# Patient Record
Sex: FEMALE | Race: WHITE | Employment: OTHER | URBAN - METROPOLITAN AREA
[De-identification: names, ages, dates, MRNs, and addresses within clinical notes are randomized per-mention and may not be internally consistent; named-entity substitution may affect disease eponyms.]

---

## 2017-02-10 ENCOUNTER — HOSPITAL ENCOUNTER (OUTPATIENT)
Dept: ULTRASOUND IMAGING | Age: 77
Discharge: HOME | End: 2017-02-10
Attending: UROLOGY
Payer: MEDICARE

## 2017-02-10 DIAGNOSIS — N39.0 RECURRENT UTI: ICD-10-CM

## 2017-02-10 PROCEDURE — 76770 US EXAM ABDO BACK WALL COMP: CPT

## 2017-05-15 ENCOUNTER — HOSPITAL ENCOUNTER (OUTPATIENT)
Dept: MRI IMAGING | Age: 77
Discharge: HOME | End: 2017-05-15
Attending: INTERNAL MEDICINE
Payer: MEDICARE

## 2017-05-15 DIAGNOSIS — K86.2 PANCREAS CYST: ICD-10-CM

## 2017-05-15 LAB — CREAT BLD-MCNC: 0.6 MG/DL (ref 0.6–1)

## 2017-05-15 PROCEDURE — 74011000258 HC RX REV CODE- 258: Performed by: INTERNAL MEDICINE

## 2017-05-15 PROCEDURE — 74183 MRI ABD W/O CNTR FLWD CNTR: CPT

## 2017-05-15 PROCEDURE — 74011250636 HC RX REV CODE- 250/636: Performed by: INTERNAL MEDICINE

## 2017-05-15 PROCEDURE — 82565 ASSAY OF CREATININE: CPT

## 2017-05-15 PROCEDURE — A9577 INJ MULTIHANCE: HCPCS | Performed by: INTERNAL MEDICINE

## 2017-05-15 RX ORDER — SODIUM CHLORIDE 0.9 % (FLUSH) 0.9 %
10 SYRINGE (ML) INJECTION
Status: COMPLETED | OUTPATIENT
Start: 2017-05-15 | End: 2017-05-15

## 2017-05-15 RX ADMIN — GADOBENATE DIMEGLUMINE 10 ML: 529 INJECTION, SOLUTION INTRAVENOUS at 17:22

## 2017-05-15 RX ADMIN — SODIUM CHLORIDE 100 ML: 900 INJECTION, SOLUTION INTRAVENOUS at 17:22

## 2017-05-15 RX ADMIN — Medication 10 ML: at 17:22

## 2017-05-23 ENCOUNTER — HOSPITAL ENCOUNTER (OUTPATIENT)
Dept: LAB | Age: 77
Discharge: HOME | End: 2017-05-23
Payer: MEDICARE

## 2017-05-23 DIAGNOSIS — R07.9 CHEST PAIN, UNSPECIFIED TYPE: ICD-10-CM

## 2017-05-23 DIAGNOSIS — R53.83 FATIGUE, UNSPECIFIED TYPE: ICD-10-CM

## 2017-05-23 LAB
ALBUMIN SERPL BCP-MCNC: 3.7 G/DL (ref 3.2–4.6)
ALBUMIN/GLOB SERPL: 0.9 {RATIO}
ALP SERPL-CCNC: 116 U/L (ref 50–136)
ALT SERPL-CCNC: 16 U/L (ref 12–65)
ANION GAP BLD CALC-SCNC: 5 MMOL/L
AST SERPL W P-5'-P-CCNC: 16 U/L (ref 15–37)
BASOPHILS # BLD AUTO: 0.1 K/UL (ref 0–0.2)
BASOPHILS # BLD: 1 % (ref 0–2)
BILIRUB SERPL-MCNC: 0.6 MG/DL (ref 0.2–1.1)
BNP SERPL-MCNC: 135 PG/ML
BUN SERPL-MCNC: 19 MG/DL (ref 8–23)
CALCIUM SERPL-MCNC: 9.3 MG/DL (ref 8.3–10.4)
CHLORIDE SERPL-SCNC: 107 MMOL/L (ref 98–107)
CK SERPL-CCNC: 39 U/L (ref 21–215)
CO2 SERPL-SCNC: 30 MMOL/L (ref 21–32)
CREAT SERPL-MCNC: 0.7 MG/DL (ref 0.6–1)
DIFFERENTIAL METHOD BLD: ABNORMAL
EOSINOPHIL # BLD: 0.1 K/UL (ref 0–0.8)
EOSINOPHIL NFR BLD: 1 % (ref 0.5–7.8)
ERYTHROCYTE [DISTWIDTH] IN BLOOD BY AUTOMATED COUNT: 13 % (ref 11.9–14.6)
ERYTHROCYTE [SEDIMENTATION RATE] IN BLOOD: 21 MM/HR (ref 0–30)
GLOBULIN SER CALC-MCNC: 3.9 G/DL
GLUCOSE SERPL-MCNC: 103 MG/DL (ref 65–100)
HCT VFR BLD AUTO: 42.6 % (ref 35.8–46.3)
HGB BLD-MCNC: 14.1 G/DL (ref 11.7–15.4)
LYMPHOCYTES # BLD AUTO: 21 % (ref 13–44)
LYMPHOCYTES # BLD: 1.8 K/UL (ref 0.5–4.6)
MCH RBC QN AUTO: 30.4 PG (ref 26.1–32.9)
MCHC RBC AUTO-ENTMCNC: 33.1 G/DL (ref 31.4–35)
MCV RBC AUTO: 91.8 FL (ref 79.6–97.8)
MONOCYTES # BLD: 0.8 K/UL (ref 0.1–1.3)
MONOCYTES NFR BLD AUTO: 9 % (ref 4–12)
NEUTS SEG # BLD: 6.1 K/UL (ref 1.7–8.2)
NEUTS SEG NFR BLD AUTO: 68 % (ref 43–78)
PLATELET # BLD AUTO: 266 K/UL (ref 150–450)
PMV BLD AUTO: 9.7 FL (ref 10.8–14.1)
POTASSIUM SERPL-SCNC: 4.5 MMOL/L (ref 3.5–5.1)
PROT SERPL-MCNC: 7.6 G/DL (ref 6.3–8.2)
RBC # BLD AUTO: 4.64 M/UL (ref 4.05–5.25)
SODIUM SERPL-SCNC: 142 MMOL/L (ref 136–145)
TSH SERPL DL<=0.005 MIU/L-ACNC: 1.16 UIU/ML (ref 0.36–3.74)
WBC # BLD AUTO: 9 K/UL (ref 4.3–11.1)

## 2017-05-23 PROCEDURE — 83880 ASSAY OF NATRIURETIC PEPTIDE: CPT | Performed by: INTERNAL MEDICINE

## 2017-05-23 PROCEDURE — 36415 COLL VENOUS BLD VENIPUNCTURE: CPT | Performed by: INTERNAL MEDICINE

## 2017-05-23 PROCEDURE — 84443 ASSAY THYROID STIM HORMONE: CPT | Performed by: INTERNAL MEDICINE

## 2017-05-23 PROCEDURE — 82550 ASSAY OF CK (CPK): CPT | Performed by: INTERNAL MEDICINE

## 2017-05-23 PROCEDURE — 80053 COMPREHEN METABOLIC PANEL: CPT | Performed by: INTERNAL MEDICINE

## 2017-05-23 PROCEDURE — 85025 COMPLETE CBC W/AUTO DIFF WBC: CPT | Performed by: INTERNAL MEDICINE

## 2017-05-23 PROCEDURE — 85652 RBC SED RATE AUTOMATED: CPT | Performed by: INTERNAL MEDICINE

## 2017-05-23 NOTE — PROGRESS NOTES
Ms Giovana Weiss,  Your blood work looks very well, heart failure hormone level is normal along with all the others evaluating for possible autoimmune disease. See you  Back after your stress testing completed.   thanks

## 2017-06-26 PROBLEM — R00.2 PALPITATIONS: Status: ACTIVE | Noted: 2017-06-26

## 2018-03-12 ENCOUNTER — HOSPITAL ENCOUNTER (OUTPATIENT)
Age: 78
Setting detail: OUTPATIENT SURGERY
Discharge: HOME | End: 2018-03-12
Attending: ORTHOPAEDIC SURGERY | Admitting: ORTHOPAEDIC SURGERY
Payer: MEDICARE

## 2018-03-12 ENCOUNTER — ANESTHESIA (OUTPATIENT)
Dept: SURGERY | Age: 78
End: 2018-03-12
Payer: MEDICARE

## 2018-03-12 ENCOUNTER — ANESTHESIA EVENT (OUTPATIENT)
Dept: SURGERY | Age: 78
End: 2018-03-12
Payer: MEDICARE

## 2018-03-12 VITALS
TEMPERATURE: 98.6 F | WEIGHT: 190 LBS | BODY MASS INDEX: 28.89 KG/M2 | DIASTOLIC BLOOD PRESSURE: 79 MMHG | RESPIRATION RATE: 12 BRPM | HEART RATE: 84 BPM | SYSTOLIC BLOOD PRESSURE: 181 MMHG | OXYGEN SATURATION: 93 %

## 2018-03-12 PROCEDURE — 74011250636 HC RX REV CODE- 250/636: Performed by: ANESTHESIOLOGY

## 2018-03-12 PROCEDURE — 76010000154 HC OR TIME FIRST 0.5 HR: Performed by: ORTHOPAEDIC SURGERY

## 2018-03-12 PROCEDURE — 77030000032 HC CUF TRNQT ZIMM -B: Performed by: ORTHOPAEDIC SURGERY

## 2018-03-12 PROCEDURE — 74011250636 HC RX REV CODE- 250/636

## 2018-03-12 PROCEDURE — 74011000258 HC RX REV CODE- 258: Performed by: ORTHOPAEDIC SURGERY

## 2018-03-12 PROCEDURE — 74011250637 HC RX REV CODE- 250/637: Performed by: ANESTHESIOLOGY

## 2018-03-12 PROCEDURE — 77030010507 HC ADH SKN DERMBND J&J -B: Performed by: ORTHOPAEDIC SURGERY

## 2018-03-12 PROCEDURE — 77030002986 HC SUT PROL J&J -A: Performed by: ORTHOPAEDIC SURGERY

## 2018-03-12 PROCEDURE — 76210000020 HC REC RM PH II FIRST 0.5 HR: Performed by: ORTHOPAEDIC SURGERY

## 2018-03-12 PROCEDURE — 76210000063 HC OR PH I REC FIRST 0.5 HR: Performed by: ORTHOPAEDIC SURGERY

## 2018-03-12 PROCEDURE — 77030011884 HC TAPE CST PLSTR BSNM -A: Performed by: ORTHOPAEDIC SURGERY

## 2018-03-12 PROCEDURE — 76060000031 HC ANESTHESIA FIRST 0.5 HR: Performed by: ORTHOPAEDIC SURGERY

## 2018-03-12 PROCEDURE — 74011000250 HC RX REV CODE- 250: Performed by: ORTHOPAEDIC SURGERY

## 2018-03-12 PROCEDURE — 74011000250 HC RX REV CODE- 250

## 2018-03-12 PROCEDURE — 77030006603 HC BLD CRPL ENDOSC S&N -B: Performed by: ORTHOPAEDIC SURGERY

## 2018-03-12 PROCEDURE — 77030018836 HC SOL IRR NACL ICUM -A: Performed by: ORTHOPAEDIC SURGERY

## 2018-03-12 RX ORDER — OXYCODONE AND ACETAMINOPHEN 5; 325 MG/1; MG/1
1 TABLET ORAL AS NEEDED
Status: DISCONTINUED | OUTPATIENT
Start: 2018-03-12 | End: 2018-03-12 | Stop reason: HOSPADM

## 2018-03-12 RX ORDER — FENTANYL CITRATE 50 UG/ML
INJECTION, SOLUTION INTRAMUSCULAR; INTRAVENOUS AS NEEDED
Status: DISCONTINUED | OUTPATIENT
Start: 2018-03-12 | End: 2018-03-12 | Stop reason: HOSPADM

## 2018-03-12 RX ORDER — FAMOTIDINE 20 MG/1
20 TABLET, FILM COATED ORAL ONCE
Status: COMPLETED | OUTPATIENT
Start: 2018-03-12 | End: 2018-03-12

## 2018-03-12 RX ORDER — LIDOCAINE HYDROCHLORIDE 20 MG/ML
INJECTION, SOLUTION EPIDURAL; INFILTRATION; INTRACAUDAL; PERINEURAL AS NEEDED
Status: DISCONTINUED | OUTPATIENT
Start: 2018-03-12 | End: 2018-03-12 | Stop reason: HOSPADM

## 2018-03-12 RX ORDER — LIDOCAINE HYDROCHLORIDE 10 MG/ML
INJECTION INFILTRATION; PERINEURAL AS NEEDED
Status: DISCONTINUED | OUTPATIENT
Start: 2018-03-12 | End: 2018-03-12 | Stop reason: HOSPADM

## 2018-03-12 RX ORDER — SODIUM CHLORIDE 0.9 % (FLUSH) 0.9 %
5-10 SYRINGE (ML) INJECTION AS NEEDED
Status: DISCONTINUED | OUTPATIENT
Start: 2018-03-12 | End: 2018-03-12 | Stop reason: HOSPADM

## 2018-03-12 RX ORDER — BUPIVACAINE HYDROCHLORIDE 5 MG/ML
INJECTION, SOLUTION EPIDURAL; INTRACAUDAL AS NEEDED
Status: DISCONTINUED | OUTPATIENT
Start: 2018-03-12 | End: 2018-03-12 | Stop reason: HOSPADM

## 2018-03-12 RX ORDER — PROPOFOL 10 MG/ML
INJECTION, EMULSION INTRAVENOUS AS NEEDED
Status: DISCONTINUED | OUTPATIENT
Start: 2018-03-12 | End: 2018-03-12 | Stop reason: HOSPADM

## 2018-03-12 RX ORDER — MIDAZOLAM HYDROCHLORIDE 1 MG/ML
2 INJECTION, SOLUTION INTRAMUSCULAR; INTRAVENOUS
Status: DISCONTINUED | OUTPATIENT
Start: 2018-03-12 | End: 2018-03-12 | Stop reason: HOSPADM

## 2018-03-12 RX ORDER — HYDROMORPHONE HYDROCHLORIDE 2 MG/ML
0.5 INJECTION, SOLUTION INTRAMUSCULAR; INTRAVENOUS; SUBCUTANEOUS
Status: DISCONTINUED | OUTPATIENT
Start: 2018-03-12 | End: 2018-03-12 | Stop reason: HOSPADM

## 2018-03-12 RX ORDER — NALOXONE HYDROCHLORIDE 0.4 MG/ML
0.2 INJECTION, SOLUTION INTRAMUSCULAR; INTRAVENOUS; SUBCUTANEOUS AS NEEDED
Status: DISCONTINUED | OUTPATIENT
Start: 2018-03-12 | End: 2018-03-12 | Stop reason: HOSPADM

## 2018-03-12 RX ORDER — SODIUM CHLORIDE, SODIUM LACTATE, POTASSIUM CHLORIDE, CALCIUM CHLORIDE 600; 310; 30; 20 MG/100ML; MG/100ML; MG/100ML; MG/100ML
75 INJECTION, SOLUTION INTRAVENOUS CONTINUOUS
Status: DISCONTINUED | OUTPATIENT
Start: 2018-03-12 | End: 2018-03-12 | Stop reason: HOSPADM

## 2018-03-12 RX ORDER — LIDOCAINE HYDROCHLORIDE 10 MG/ML
0.1 INJECTION INFILTRATION; PERINEURAL AS NEEDED
Status: DISCONTINUED | OUTPATIENT
Start: 2018-03-12 | End: 2018-03-12 | Stop reason: HOSPADM

## 2018-03-12 RX ORDER — PROPOFOL 10 MG/ML
INJECTION, EMULSION INTRAVENOUS
Status: DISCONTINUED | OUTPATIENT
Start: 2018-03-12 | End: 2018-03-12 | Stop reason: HOSPADM

## 2018-03-12 RX ADMIN — LIDOCAINE HYDROCHLORIDE 50 MG: 20 INJECTION, SOLUTION EPIDURAL; INFILTRATION; INTRACAUDAL; PERINEURAL at 08:45

## 2018-03-12 RX ADMIN — CLINDAMYCIN PHOSPHATE 900 MG: 150 INJECTION, SOLUTION INTRAVENOUS at 08:41

## 2018-03-12 RX ADMIN — PROPOFOL 140 MCG/KG/MIN: 10 INJECTION, EMULSION INTRAVENOUS at 08:45

## 2018-03-12 RX ADMIN — SODIUM CHLORIDE, SODIUM LACTATE, POTASSIUM CHLORIDE, AND CALCIUM CHLORIDE 75 ML/HR: 600; 310; 30; 20 INJECTION, SOLUTION INTRAVENOUS at 08:00

## 2018-03-12 RX ADMIN — PROPOFOL 20 MG: 10 INJECTION, EMULSION INTRAVENOUS at 08:49

## 2018-03-12 RX ADMIN — FENTANYL CITRATE 50 MCG: 50 INJECTION, SOLUTION INTRAMUSCULAR; INTRAVENOUS at 08:45

## 2018-03-12 RX ADMIN — FAMOTIDINE 20 MG: 20 TABLET, FILM COATED ORAL at 08:00

## 2018-03-12 RX ADMIN — PROPOFOL 20 MG: 10 INJECTION, EMULSION INTRAVENOUS at 08:45

## 2018-03-12 NOTE — PERIOP NOTES
PACU DISCHARGE NOTE    Vital signs stable, pain well controlled, alert and oriented times three or at baseline, follow up per surgeon, no anesthetic complications. Discharge instructions given to pt and her brother, Rich Colón. Both voice understanding of instructions. Pt is without c/o pain or discomfort and has had her IV removed intact. Rich Colón has pt's discharge prescription for [place] and pt's belongings. Pt to discharge door via wheelchair and left in care of her brother.

## 2018-03-12 NOTE — ANESTHESIA POSTPROCEDURE EVALUATION
Post-Anesthesia Evaluation and Assessment    Patient: Ankur Handley MRN: [id number]  SSN: [id number]    YOB: 1940  Age: 77 y.o. Sex: female       Cardiovascular Function/Vital Signs  Visit Vitals    /79    Pulse 84    Temp 37 °C (98.6 °F)    Resp 12    Wt 86.2 kg (190 lb)    SpO2 93%    BMI 28.89 kg/m2       Patient is status post total IV anesthesia anesthesia for Procedure(s):  HAND CARPAL TUNNEL RELEASE ENDOSCOPIC RIGHT. Nausea/Vomiting: None    Postoperative hydration reviewed and adequate. Pain:  Pain Scale 1: Numeric (0 - 10) (03/12/18 4761)  Pain Intensity 1: 0 (03/12/18 9522)   Managed    Neurological Status:   Neuro (WDL): Within Defined Limits (03/12/18 0905)   At baseline    Mental Status and Level of Consciousness: Arousable    Pulmonary Status:   O2 Device: Room air (03/12/18 8557)   Adequate oxygenation and airway patent    Complications related to anesthesia: None    Post-anesthesia assessment completed.  No concerns    Signed By: Nuria Gee MD     March 12, 2018

## 2018-03-12 NOTE — BRIEF OP NOTE
BRIEF OPERATIVE NOTE    Date of Procedure: 3/12/2018   Preoperative Diagnosis: Carpal tunnel syndrome, right upper limb [G56.01]  Postoperative Diagnosis: Carpal tunnel syndrome, right upper limb    Procedure(s):  HAND CARPAL TUNNEL RELEASE ENDOSCOPIC RIGHT  Surgeon(s) and Role:     * Sonal Smith MD - Primary         Assistant Staff: None      Surgical Staff:  Circ-1: Brigette Bower RN  Scrub Tech-1: Diamond Bustillo  Event Time In   Incision Start 5329   Incision Close 0857     Anesthesia: MAC   Estimated Blood Loss: MINIMAL  Specimens: * No specimens in log *   Findings: SEE DICTATION   Complications: NONE  Implants: * No implants in log *

## 2018-03-12 NOTE — ANESTHESIA PREPROCEDURE EVALUATION
Anesthetic History   No history of anesthetic complications            Review of Systems / Medical History  Patient summary reviewed, nursing notes reviewed and pertinent labs reviewed    Pulmonary  Within defined limits                 Neuro/Psych   Within defined limits           Cardiovascular    Hypertension: well controlled          CAD    Exercise tolerance: >4 METS     GI/Hepatic/Renal     GERD: well controlled           Endo/Other        Arthritis     Other Findings              Physical Exam    Airway  Mallampati: II  TM Distance: 4 - 6 cm  Neck ROM: normal range of motion   Mouth opening: Normal     Cardiovascular    Rhythm: regular           Dental  No notable dental hx       Pulmonary  Breath sounds clear to auscultation               Abdominal         Other Findings            Anesthetic Plan    ASA: 3  Anesthesia type: total IV anesthesia          Induction: Intravenous  Anesthetic plan and risks discussed with: Patient

## 2018-03-12 NOTE — DISCHARGE INSTRUCTIONS
Keep dressing clean, dry and intact until post op day number 2-3. Then may shower, pat dry and keep covered until follow up. Do not scrub incision or submerge under water. Move fingers, elevate, and ice to prevent swelling. No heavy lifting. ACTIVITY  · As tolerated and as directed by your doctor. · Bathe or shower as directed by your doctor. DIET  · Clear liquids until no nausea or vomiting; then light diet for the first day. · Advance to regular diet on second day, unless your doctor orders otherwise. · If nausea and vomiting continues, call your doctor. PAIN  · Take pain medication as directed by your doctor. · Call your doctor if pain is NOT relieved by medication. · DO NOT take aspirin of blood thinners unless directed by your doctor. CALL YOUR DOCTOR IF   · Excessive bleeding that does not stop after holding pressure over the area  · Temperature of 101 degrees F or above  · Excessive redness, swelling or bruising, and/ or green or yellow, smelly discharge from incision    AFTER ANESTHESIA   · For the first 24 hours: DO NOT Drive, Drink alcoholic beverages, or Make important decisions. · Be aware of dizziness following anesthesia and while taking pain medication. After general anesthesia or intravenous sedation, for 24 hours or while taking prescription Narcotics:  · Limit your activities  · Do not drive and operate hazardous machinery  · Do not make important personal or business decisions  · Do  not drink alcoholic beverages  · If you have not urinated within 8 hours after discharge, please contact your surgeon on call. *  Please give a list of your current medications to your Primary Care Provider. *  Please update this list whenever your medications are discontinued, doses are      changed, or new medications (including over-the-counter products) are added. *  Please carry medication information at all times in case of emergency situations.       These are general instructions for a healthy lifestyle:    No smoking/ No tobacco products/ Avoid exposure to second hand smoke    Surgeon General's Warning:  Quitting smoking now greatly reduces serious risk to your health. Obesity, smoking, and sedentary lifestyle greatly increases your risk for illness    A healthy diet, regular physical exercise & weight monitoring are important for maintaining a healthy lifestyle    You may be retaining fluid if you have a history of heart failure or if you experience any of the following symptoms:  Weight gain of 3 pounds or more overnight or 5 pounds in a week, increased swelling in our hands or feet or shortness of breath while lying flat in bed. Please call your doctor as soon as you notice any of these symptoms; do not wait until your next office visit. Recognize signs and symptoms of STROKE:    F-face looks uneven    A-arms unable to move or move unevenly    S-speech slurred or non-existent    T-time-call 911 as soon as signs and symptoms begin-DO NOT go       Back to bed or wait to see if you get better-TIME IS BRAIN.

## 2018-03-12 NOTE — IP AVS SNAPSHOT
Emiliana Muhammad 
 
 
 [address] [facility] [address] [facility] 
[phone] Patient: Leah Soto MRN: [id number] About your hospitalization You were admitted on:  2018 You last received care in the:  [facility] OP PACU You were discharged on:  2018 Why you were hospitalized Your primary diagnosis was:  Not on File Follow-up Information Follow up With Details Comments Contact Info MD Dr. Xavier Delgado office will call you with follow-up appointment date and time. [facility] [address] [facility] [id number] 
[phone] Discharge Orders None A check rosendo indicates which time of day the medication should be taken. My Medications CONTINUE taking these medications Instructions Each Dose to Equal  
 Morning Noon Evening Bedtime  
 aspirin 81 mg chewable tablet Your last dose was: Your next dose is: Take 81 mg by mouth every morning. Indications: continue/ take on the [facility] 81 mg  
    
   
   
   
  
 cranberry 400 mg Cap Your last dose was: Your next dose is: Take 1 Cap by mouth three (3) times daily. 1 Cap  
    
   
   
   
  
 isosorbide mononitrate ER 30 mg tablet Commonly known as:  IMDUR Your last dose was: Your next dose is: Take 1 Tab by mouth daily. 30 mg  
    
   
   
   
  
 lisinopril 10 mg tablet Commonly known as:  Sola Molina Your last dose was: Your next dose is: Take 1 Tab by mouth every morning. Indications: hypertension 10 mg  
    
   
   
   
  
 meloxicam 7.5 mg tablet Commonly known as:  MOBIC Your last dose was: Your next dose is:    
   
   
 TK 1 T PO  BID as needed  
     
   
   
   
  
 methenamine hippurate 1 gram tablet Commonly known as:  HIPREX Your last dose was: Your next dose is: Take 1 Tab by mouth two (2) times daily (with meals). Indications: PREVENTION OF BACTERIAL URINARY TRACT INFECTION  
 1 g  
    
   
   
   
  
 metoprolol succinate 25 mg XL tablet Commonly known as:  TOPROL-XL Your last dose was: Your next dose is: Take 1 Tab by mouth daily. 25 mg  
    
   
   
   
  
 nitroglycerin 0.4 mg SL tablet Commonly known as:  NITROSTAT Your last dose was: Your next dose is:    
   
   
 DISSOLVE 1 TABLET UNDER TONGUE EVERY 5 MINUTES AS NEEDED FOR CHEST PAIN  
     
   
   
   
  
 raNITIdine 300 mg tablet Commonly known as:  ZANTAC Your last dose was: Your next dose is:    
   
   
 TK 1 T PO  QD HS as needed TYLENOL ARTHRITIS PAIN 650 mg Dayanara Almeida Generic drug:  acetaminophen Your last dose was: Your next dose is: Take 650 mg by mouth every six (6) hours as needed for Pain. 650 mg Discharge Instructions Keep dressing clean, dry and intact until post op day number 2-3. Then may shower, pat dry and keep covered until follow up. Do not scrub incision or submerge under water. Move fingers, elevate, and ice to prevent swelling. No heavy lifting. ACTIVITY · As tolerated and as directed by your doctor. · Bathe or shower as directed by your doctor. DIET · Clear liquids until no nausea or vomiting; then light diet for the first day. · Advance to regular diet on second day, unless your doctor orders otherwise. · If nausea and vomiting continues, call your doctor. PAIN 
· Take pain medication as directed by your doctor. · Call your doctor if pain is NOT relieved by medication. · DO NOT take aspirin of blood thinners unless directed by your doctor. CALL YOUR DOCTOR IF  
· Excessive bleeding that does not stop after holding pressure over the area · Temperature of 101 degrees F or above · Excessive redness, swelling or bruising, and/ or green or yellow, smelly discharge from incision AFTER ANESTHESIA · For the first 24 hours: DO NOT Drive, Drink alcoholic beverages, or Make important decisions. · Be aware of dizziness following anesthesia and while taking pain medication. After general anesthesia or intravenous sedation, for 24 hours or while taking prescription Narcotics: · Limit your activities · Do not drive and operate hazardous machinery · Do not make important personal or business decisions · Do  not drink alcoholic beverages · If you have not urinated within 8 hours after discharge, please contact your surgeon on call. *  Please give a list of your current medications to your Primary Care Provider. *  Please update this list whenever your medications are discontinued, doses are 
    changed, or new medications (including over-the-counter products) are added. *  Please carry medication information at all times in case of emergency situations. These are general instructions for a healthy lifestyle: No smoking/ No tobacco products/ Avoid exposure to second hand smoke Surgeon General's Warning:  Quitting smoking now greatly reduces serious risk to your health. Obesity, smoking, and sedentary lifestyle greatly increases your risk for illness A healthy diet, regular physical exercise & weight monitoring are important for maintaining a healthy lifestyle You may be retaining fluid if you have a history of heart failure or if you experience any of the following symptoms:  Weight gain of 3 pounds or more overnight or 5 pounds in a week, increased swelling in our hands or feet or shortness of breath while lying flat in bed. Please call your doctor as soon as you notice any of these symptoms; do not wait until your next office visit. Recognize signs and symptoms of STROKE: 
 
F-face looks uneven A-arms unable to move or move unevenly S-speech slurred or non-existent T-time-call 911 as soon as signs and symptoms begin-DO NOT go Back to bed or wait to see if you get better-TIME IS BRAIN. ACO Transitions of Care Introducing [employer] [address] Dayanara Bermudez offers a voluntary care coordination program to provide high quality service and care to [facility] fee-for-service beneficiaries. Faizan Rosado was designed to help you enhance your health and well-being through the following services: ? Transitions of Care  support for individuals who are transitioning from one care setting to another (example: Hospital to home). ? Chronic and Complex Care Coordination  support for individuals and caregivers of those with serious or chronic illnesses or with more than one chronic (ongoing) condition and those who take a number of different medications. If you meet specific medical criteria, a [facility] [address] may call you directly to coordinate your care with your primary care physician and your other care providers. For questions about the [facility] programs, please, contact your physicians office. For general questions or additional information about Accountable Care Organizations: 
Please visit www.medicare.gov/acos. html or call 1-800-MEDICARE (3-842.506.8827) TTY users should call 8-672.829.1978. Introducing [facility]! Dear Tracy Sahni: Thank you for requesting a Jelli account. Our records indicate that you already have an active Jelli account. You can access your account anytime at [email or web address] Did you know that you can access your hospital and ER discharge instructions at any time in Jelli? You can also review all of your test results from your hospital stay or ER visit. Additional Information If you have questions, please visit the Frequently Asked Questions section of the [employer] website at [email or web address] Remember, MyChart is NOT to be used for urgent needs. For medical emergencies, dial 911. Now available from your iPhone and Android! Providers Seen During Your Hospitalization Provider Specialty Primary office phone Sirisha Keys MD Orthopedic Surgery [phone] Your Primary Care Physician (PCP) Primary Care Physician Office Phone Office Fax Shaquille Erosaayush [phone] You are allergic to the following Allergen Reactions Adhesive Hives Amoxicillin Swelling Anaphylaxis Other reaction(s): Lips/Mouth swelling-A Iodine Rash Other reaction(s): Hives/Swelling-A Iodine And Iodide Containing Products Rash Penicillin Rash Shellfish Containing Products Hives Shellfish Derived Anaphylaxis Adhesive Tape-Silicones Other (comments) Rash Blisters; PAPER TAPE OK 
blisters Bactrim (Sulfamethoprim Ss) Unknown (comments) Leg cramps Ciprofloxacin Myalgia Other (comments) Other reaction(s): Joint soreness-I Fish Oil (Docosahexanoic Acid-Epa) Rash  
    
 Nsaids (Non-Steroidal Anti-Inflammatory Drug) Other (comments) Bowel obstruction Omega 3 (Omega-3 Fatty Acids) Hives Statins-Hmg-Coa Reductase Inhibitors Other (comments) Leg cramps Sulfamethoxazole-Trimethoprim Myalgia Tree Nut Hives Vit E-Nonoxynol 9-Aloe Vera Hives Recent Documentation Weight BMI OB Status Smoking Status 86.2 kg 28.89 kg/m2 Hysterectomy Never Smoker Emergency Contacts Name Discharge Info Relation Home Work Mobile SolderPawan DISCHARGE CAREGIVER [3] Spouse [3] [phone] Patient Belongings The following personal items are in your possession at time of discharge: 
  Dental Appliances: None Please provide this summary of care documentation to your next provider. Signatures-by signing, you are acknowledging that this After Visit Summary has been reviewed with you and you have received a copy. Patient Signature:  ____________________________________________________________ Date:  ____________________________________________________________  
  
Garretson Favorite Provider Signature:  ____________________________________________________________ Date:  ____________________________________________________________

## 2018-06-15 PROBLEM — F32.A MILD DEPRESSION: Status: ACTIVE | Noted: 2018-06-15

## 2018-12-05 ENCOUNTER — HOSPITAL ENCOUNTER (OUTPATIENT)
Dept: MRI IMAGING | Age: 78
Discharge: HOME | End: 2018-12-05
Attending: FAMILY MEDICINE
Payer: MEDICARE

## 2018-12-05 DIAGNOSIS — R26.9 NEUROLOGIC GAIT DYSFUNCTION: ICD-10-CM

## 2018-12-05 DIAGNOSIS — R25.1 TREMOR: ICD-10-CM

## 2018-12-05 PROCEDURE — 70551 MRI BRAIN STEM W/O DYE: CPT

## 2018-12-06 NOTE — PROGRESS NOTES
Sent [employer] message letting patient know her MRI was normal and to follow up as needed per Dr Martinez Pun

## 2018-12-19 ENCOUNTER — HOSPITAL ENCOUNTER (OUTPATIENT)
Dept: LAB | Age: 78
Discharge: HOME | End: 2018-12-19
Payer: MEDICARE

## 2018-12-19 DIAGNOSIS — E78.5 DYSLIPIDEMIA: Chronic | ICD-10-CM

## 2018-12-19 LAB
CHOLEST SERPL-MCNC: 159 MG/DL
HDLC SERPL-MCNC: 61 MG/DL (ref 40–60)
HDLC SERPL: 2.6 {RATIO}
LDLC SERPL CALC-MCNC: 79.8 MG/DL
LIPID PROFILE,FLP: ABNORMAL
TRIGL SERPL-MCNC: 91 MG/DL (ref 35–150)
VLDLC SERPL CALC-MCNC: 18.2 MG/DL (ref 6–23)

## 2018-12-19 PROCEDURE — 36415 COLL VENOUS BLD VENIPUNCTURE: CPT

## 2018-12-19 PROCEDURE — 80061 LIPID PANEL: CPT

## 2019-01-01 ENCOUNTER — APPOINTMENT (OUTPATIENT)
Dept: GENERAL RADIOLOGY | Age: 79
DRG: 871 | End: 2019-01-01
Attending: EMERGENCY MEDICINE
Payer: MEDICARE

## 2019-01-01 ENCOUNTER — HOSPITAL ENCOUNTER (INPATIENT)
Age: 79
LOS: 4 days | Discharge: HOME HEALTH | DRG: 871 | End: 2019-01-05
Attending: EMERGENCY MEDICINE | Admitting: HOSPITALIST
Payer: MEDICARE

## 2019-01-01 DIAGNOSIS — J18.9 COMMUNITY ACQUIRED PNEUMONIA OF RIGHT MIDDLE LOBE OF LUNG: Primary | ICD-10-CM

## 2019-01-01 DIAGNOSIS — R07.9 CHEST PAIN, UNSPECIFIED TYPE: ICD-10-CM

## 2019-01-01 PROBLEM — E87.6 HYPOKALEMIA: Status: ACTIVE | Noted: 2019-01-01

## 2019-01-01 PROBLEM — N17.9 ACUTE RENAL FAILURE (ARF) (HCC): Status: ACTIVE | Noted: 2019-01-01

## 2019-01-01 PROBLEM — J15.9 COMMUNITY ACQUIRED BACTERIAL PNEUMONIA: Status: ACTIVE | Noted: 2019-01-01

## 2019-01-01 PROBLEM — A41.9 SEPSIS (HCC): Status: ACTIVE | Noted: 2019-01-01

## 2019-01-01 LAB
ALBUMIN SERPL-MCNC: 3 G/DL (ref 3.2–4.6)
ALBUMIN/GLOB SERPL: 0.6 {RATIO}
ALP SERPL-CCNC: 251 U/L (ref 50–130)
ALT SERPL-CCNC: 30 U/L (ref 12–65)
ANION GAP SERPL CALC-SCNC: 11 MMOL/L
AST SERPL-CCNC: 17 U/L (ref 15–37)
ATRIAL RATE: 94 BPM
BASOPHILS # BLD: 0.3 K/UL (ref 0–0.2)
BASOPHILS NFR BLD: 1 % (ref 0–2)
BILIRUB SERPL-MCNC: 0.6 MG/DL (ref 0.2–1.1)
BUN SERPL-MCNC: 39 MG/DL (ref 8–23)
CALCIUM SERPL-MCNC: 9.2 MG/DL (ref 8.3–10.4)
CALCULATED P AXIS, ECG09: 94 DEGREES
CALCULATED R AXIS, ECG10: 77 DEGREES
CALCULATED T AXIS, ECG11: 65 DEGREES
CHLORIDE SERPL-SCNC: 97 MMOL/L (ref 98–107)
CO2 SERPL-SCNC: 26 MMOL/L (ref 21–32)
CREAT SERPL-MCNC: 1.06 MG/DL (ref 0.6–1)
DIAGNOSIS, 93000: NORMAL
DIFFERENTIAL METHOD BLD: ABNORMAL
EOSINOPHIL # BLD: 0 K/UL (ref 0–0.8)
EOSINOPHIL NFR BLD: 0 % (ref 0.5–7.8)
ERYTHROCYTE [DISTWIDTH] IN BLOOD BY AUTOMATED COUNT: 13.7 % (ref 11.9–14.6)
GLOBULIN SER CALC-MCNC: 4.9 G/DL (ref 2.3–3.5)
GLUCOSE SERPL-MCNC: 109 MG/DL (ref 65–100)
HCT VFR BLD AUTO: 45.3 % (ref 35.8–46.3)
HGB BLD-MCNC: 15 G/DL (ref 11.7–15.4)
IMM GRANULOCYTES # BLD: 0.3 K/UL (ref 0–0.5)
IMM GRANULOCYTES NFR BLD AUTO: 1 % (ref 0–5)
LACTATE BLD-SCNC: 1.36 MMOL/L (ref 0.5–1.9)
LYMPHOCYTES # BLD: 1.1 K/UL (ref 0.5–4.6)
LYMPHOCYTES NFR BLD: 4 % (ref 13–44)
MCH RBC QN AUTO: 29.8 PG (ref 26.1–32.9)
MCHC RBC AUTO-ENTMCNC: 33.1 G/DL (ref 31.4–35)
MCV RBC AUTO: 89.9 FL (ref 79.6–97.8)
MONOCYTES # BLD: 0.8 K/UL (ref 0.1–1.3)
MONOCYTES NFR BLD: 3 % (ref 4–12)
NEUTS SEG # BLD: 24.2 K/UL (ref 1.7–8.2)
NEUTS SEG NFR BLD: 91 % (ref 43–78)
NRBC # BLD: 0 K/UL (ref 0–0.2)
P-R INTERVAL, ECG05: 112 MS
PLATELET # BLD AUTO: 202 K/UL (ref 150–450)
PLATELET COMMENTS,PCOM: ADEQUATE
PMV BLD AUTO: 10.6 FL (ref 9.4–12.3)
POTASSIUM SERPL-SCNC: 3.2 MMOL/L (ref 3.5–5.1)
PROCALCITONIN SERPL-MCNC: 6.9 NG/ML
PROT SERPL-MCNC: 7.9 G/DL
Q-T INTERVAL, ECG07: 328 MS
QRS DURATION, ECG06: 84 MS
QTC CALCULATION (BEZET), ECG08: 412 MS
RBC # BLD AUTO: 5.04 M/UL (ref 4.05–5.2)
RBC MORPH BLD: ABNORMAL
SODIUM SERPL-SCNC: 134 MMOL/L (ref 136–145)
TROPONIN I BLD-MCNC: 0 NG/ML (ref 0.02–0.05)
VENTRICULAR RATE, ECG03: 95 BPM
WBC # BLD AUTO: 26.7 K/UL (ref 4.3–11.1)
WBC MORPH BLD: ABNORMAL

## 2019-01-01 PROCEDURE — 74011250637 HC RX REV CODE- 250/637: Performed by: EMERGENCY MEDICINE

## 2019-01-01 PROCEDURE — 94664 DEMO&/EVAL PT USE INHALER: CPT

## 2019-01-01 PROCEDURE — 94640 AIRWAY INHALATION TREATMENT: CPT

## 2019-01-01 PROCEDURE — 74011000258 HC RX REV CODE- 258: Performed by: EMERGENCY MEDICINE

## 2019-01-01 PROCEDURE — 65270000029 HC RM PRIVATE

## 2019-01-01 PROCEDURE — 87205 SMEAR GRAM STAIN: CPT

## 2019-01-01 PROCEDURE — 96360 HYDRATION IV INFUSION INIT: CPT | Performed by: EMERGENCY MEDICINE

## 2019-01-01 PROCEDURE — 99285 EMERGENCY DEPT VISIT HI MDM: CPT | Performed by: EMERGENCY MEDICINE

## 2019-01-01 PROCEDURE — 87186 SC STD MICRODIL/AGAR DIL: CPT

## 2019-01-01 PROCEDURE — 84145 PROCALCITONIN (PCT): CPT

## 2019-01-01 PROCEDURE — 83605 ASSAY OF LACTIC ACID: CPT

## 2019-01-01 PROCEDURE — 74011250636 HC RX REV CODE- 250/636: Performed by: EMERGENCY MEDICINE

## 2019-01-01 PROCEDURE — 87040 BLOOD CULTURE FOR BACTERIA: CPT

## 2019-01-01 PROCEDURE — 74011250637 HC RX REV CODE- 250/637: Performed by: HOSPITALIST

## 2019-01-01 PROCEDURE — 74011250636 HC RX REV CODE- 250/636: Performed by: HOSPITALIST

## 2019-01-01 PROCEDURE — 74011000250 HC RX REV CODE- 250: Performed by: EMERGENCY MEDICINE

## 2019-01-01 PROCEDURE — 87077 CULTURE AEROBIC IDENTIFY: CPT

## 2019-01-01 PROCEDURE — 80053 COMPREHEN METABOLIC PANEL: CPT

## 2019-01-01 PROCEDURE — 85025 COMPLETE CBC W/AUTO DIFF WBC: CPT

## 2019-01-01 PROCEDURE — 71046 X-RAY EXAM CHEST 2 VIEWS: CPT

## 2019-01-01 PROCEDURE — 84484 ASSAY OF TROPONIN QUANT: CPT

## 2019-01-01 PROCEDURE — 93005 ELECTROCARDIOGRAM TRACING: CPT | Performed by: EMERGENCY MEDICINE

## 2019-01-01 RX ORDER — PANTOPRAZOLE SODIUM 40 MG/1
40 TABLET, DELAYED RELEASE ORAL
Status: DISCONTINUED | OUTPATIENT
Start: 2019-01-02 | End: 2019-01-05 | Stop reason: HOSPADM

## 2019-01-01 RX ORDER — METOPROLOL SUCCINATE 25 MG/1
25 TABLET, EXTENDED RELEASE ORAL DAILY
Status: DISCONTINUED | OUTPATIENT
Start: 2019-01-02 | End: 2019-01-05 | Stop reason: HOSPADM

## 2019-01-01 RX ORDER — MORPHINE SULFATE 2 MG/ML
1 INJECTION, SOLUTION INTRAMUSCULAR; INTRAVENOUS
Status: DISCONTINUED | OUTPATIENT
Start: 2019-01-01 | End: 2019-01-05 | Stop reason: HOSPADM

## 2019-01-01 RX ORDER — POTASSIUM CHLORIDE 20 MEQ/1
40 TABLET, EXTENDED RELEASE ORAL 2 TIMES DAILY
Status: COMPLETED | OUTPATIENT
Start: 2019-01-01 | End: 2019-01-03

## 2019-01-01 RX ORDER — GUAIFENESIN 600 MG/1
600 TABLET, EXTENDED RELEASE ORAL EVERY 12 HOURS
Status: DISCONTINUED | OUTPATIENT
Start: 2019-01-01 | End: 2019-01-05 | Stop reason: HOSPADM

## 2019-01-01 RX ORDER — ISOSORBIDE MONONITRATE 30 MG/1
30 TABLET, EXTENDED RELEASE ORAL DAILY
Status: DISCONTINUED | OUTPATIENT
Start: 2019-01-02 | End: 2019-01-05 | Stop reason: HOSPADM

## 2019-01-01 RX ORDER — METHENAMINE HIPPURATE 1000 MG/1
1 TABLET ORAL 2 TIMES DAILY WITH MEALS
Status: DISCONTINUED | OUTPATIENT
Start: 2019-01-01 | End: 2019-01-05 | Stop reason: HOSPADM

## 2019-01-01 RX ORDER — IPRATROPIUM BROMIDE AND ALBUTEROL SULFATE 2.5; .5 MG/3ML; MG/3ML
3 SOLUTION RESPIRATORY (INHALATION)
Status: COMPLETED | OUTPATIENT
Start: 2019-01-01 | End: 2019-01-01

## 2019-01-01 RX ORDER — METOPROLOL SUCCINATE 25 MG/1
25 TABLET, EXTENDED RELEASE ORAL DAILY
COMMUNITY
End: 2019-05-31 | Stop reason: SDUPTHER

## 2019-01-01 RX ORDER — SODIUM CHLORIDE 0.9 % (FLUSH) 0.9 %
5-10 SYRINGE (ML) INJECTION EVERY 8 HOURS
Status: DISCONTINUED | OUTPATIENT
Start: 2019-01-01 | End: 2019-01-05 | Stop reason: HOSPADM

## 2019-01-01 RX ORDER — HYDROXYZINE HYDROCHLORIDE 10 MG/1
10 TABLET, FILM COATED ORAL
Status: DISCONTINUED | OUTPATIENT
Start: 2019-01-01 | End: 2019-01-05 | Stop reason: HOSPADM

## 2019-01-01 RX ORDER — NADOLOL 40 MG/1
20 TABLET ORAL
Status: DISCONTINUED | OUTPATIENT
Start: 2019-01-01 | End: 2019-01-01

## 2019-01-01 RX ORDER — OXYCODONE AND ACETAMINOPHEN 5; 325 MG/1; MG/1
1 TABLET ORAL
Status: DISCONTINUED | OUTPATIENT
Start: 2019-01-01 | End: 2019-01-02

## 2019-01-01 RX ORDER — BENZONATATE 100 MG/1
100 CAPSULE ORAL 3 TIMES DAILY
Status: DISCONTINUED | OUTPATIENT
Start: 2019-01-01 | End: 2019-01-05 | Stop reason: HOSPADM

## 2019-01-01 RX ORDER — ACETAMINOPHEN 325 MG/1
650 TABLET ORAL
Status: DISCONTINUED | OUTPATIENT
Start: 2019-01-01 | End: 2019-01-05 | Stop reason: HOSPADM

## 2019-01-01 RX ORDER — POTASSIUM CHLORIDE 14.9 MG/ML
20 INJECTION INTRAVENOUS
Status: COMPLETED | OUTPATIENT
Start: 2019-01-01 | End: 2019-01-01

## 2019-01-01 RX ORDER — SERTRALINE HYDROCHLORIDE 50 MG/1
25 TABLET, FILM COATED ORAL EVERY EVENING
Status: DISCONTINUED | OUTPATIENT
Start: 2019-01-01 | End: 2019-01-05 | Stop reason: HOSPADM

## 2019-01-01 RX ORDER — SODIUM CHLORIDE 0.9 % (FLUSH) 0.9 %
5-10 SYRINGE (ML) INJECTION AS NEEDED
Status: DISCONTINUED | OUTPATIENT
Start: 2019-01-01 | End: 2019-01-05 | Stop reason: HOSPADM

## 2019-01-01 RX ORDER — NALOXONE HYDROCHLORIDE 0.4 MG/ML
0.4 INJECTION, SOLUTION INTRAMUSCULAR; INTRAVENOUS; SUBCUTANEOUS AS NEEDED
Status: DISCONTINUED | OUTPATIENT
Start: 2019-01-01 | End: 2019-01-05 | Stop reason: HOSPADM

## 2019-01-01 RX ORDER — OXYBUTYNIN CHLORIDE 5 MG/1
10 TABLET, EXTENDED RELEASE ORAL DAILY
Status: DISCONTINUED | OUTPATIENT
Start: 2019-01-02 | End: 2019-01-05 | Stop reason: HOSPADM

## 2019-01-01 RX ORDER — SODIUM CHLORIDE 9 MG/ML
125 INJECTION, SOLUTION INTRAVENOUS CONTINUOUS
Status: DISPENSED | OUTPATIENT
Start: 2019-01-01 | End: 2019-01-03

## 2019-01-01 RX ORDER — NITROGLYCERIN 0.4 MG/1
0.4 TABLET SUBLINGUAL AS NEEDED
Status: DISCONTINUED | OUTPATIENT
Start: 2019-01-01 | End: 2019-01-05 | Stop reason: HOSPADM

## 2019-01-01 RX ORDER — ENOXAPARIN SODIUM 100 MG/ML
40 INJECTION SUBCUTANEOUS EVERY 24 HOURS
Status: DISCONTINUED | OUTPATIENT
Start: 2019-01-01 | End: 2019-01-05 | Stop reason: HOSPADM

## 2019-01-01 RX ORDER — HYDROCODONE BITARTRATE AND HOMATROPINE METHYLBROMIDE 1.5; 5 MG/5ML; MG/5ML
5 SYRUP ORAL
Status: COMPLETED | OUTPATIENT
Start: 2019-01-01 | End: 2019-01-01

## 2019-01-01 RX ORDER — GUAIFENESIN 100 MG/5ML
81 LIQUID (ML) ORAL
Status: DISCONTINUED | OUTPATIENT
Start: 2019-01-02 | End: 2019-01-05 | Stop reason: HOSPADM

## 2019-01-01 RX ADMIN — HYDROCODONE BITARTRATE AND HOMATROPINE METHYLBROMIDE 5 ML: 5; 1.5 SOLUTION ORAL at 12:35

## 2019-01-01 RX ADMIN — CEFTRIAXONE 1 G: 1 INJECTION, POWDER, FOR SOLUTION INTRAMUSCULAR; INTRAVENOUS at 13:45

## 2019-01-01 RX ADMIN — ENOXAPARIN SODIUM 40 MG: 40 INJECTION SUBCUTANEOUS at 16:19

## 2019-01-01 RX ADMIN — IPRATROPIUM BROMIDE AND ALBUTEROL SULFATE 3 ML: .5; 3 SOLUTION RESPIRATORY (INHALATION) at 10:35

## 2019-01-01 RX ADMIN — OXYCODONE AND ACETAMINOPHEN 1 TABLET: 5; 325 TABLET ORAL at 17:36

## 2019-01-01 RX ADMIN — SERTRALINE HYDROCHLORIDE 25 MG: 50 TABLET ORAL at 17:29

## 2019-01-01 RX ADMIN — AZITHROMYCIN MONOHYDRATE 500 MG: 500 INJECTION, POWDER, LYOPHILIZED, FOR SOLUTION INTRAVENOUS at 14:20

## 2019-01-01 RX ADMIN — SODIUM CHLORIDE 125 ML/HR: 900 INJECTION, SOLUTION INTRAVENOUS at 15:45

## 2019-01-01 RX ADMIN — METHENAMINE HIPPURATE 1 G: 1000 TABLET ORAL at 18:00

## 2019-01-01 RX ADMIN — POTASSIUM CHLORIDE 20 MEQ: 14.9 INJECTION, SOLUTION INTRAVENOUS at 16:19

## 2019-01-01 RX ADMIN — POTASSIUM CHLORIDE 20 MEQ: 14.9 INJECTION, SOLUTION INTRAVENOUS at 19:06

## 2019-01-01 RX ADMIN — BENZONATATE 100 MG: 100 CAPSULE ORAL at 14:35

## 2019-01-01 RX ADMIN — BENZONATATE 100 MG: 100 CAPSULE ORAL at 21:37

## 2019-01-01 RX ADMIN — GUAIFENESIN 600 MG: 600 TABLET, EXTENDED RELEASE ORAL at 21:37

## 2019-01-01 RX ADMIN — SODIUM CHLORIDE 1000 ML: 900 INJECTION, SOLUTION INTRAVENOUS at 10:57

## 2019-01-01 RX ADMIN — POTASSIUM CHLORIDE 40 MEQ: 20 TABLET, EXTENDED RELEASE ORAL at 17:25

## 2019-01-01 RX ADMIN — GUAIFENESIN 600 MG: 600 TABLET, EXTENDED RELEASE ORAL at 14:35

## 2019-01-01 RX ADMIN — Medication 5 ML: at 21:50

## 2019-01-01 NOTE — ED NOTES
TRANSFER - OUT REPORT: 
 
Verbal report given to Roman(name) on Thelma SERNA Solder  being transferred to [address] [place] [address] for routine progression of care Report consisted of patients Situation, Background, Assessment and  
Recommendations(SBAR). Information from the following report(s) ED Summary was reviewed with the receiving nurse. Lines:  
Peripheral IV 01/01/19 Right Antecubital (Active) Site Assessment Clean, dry, & intact 1/1/2019 10:25 AM  
Phlebitis Assessment 0 1/1/2019 10:25 AM  
Infiltration Assessment 0 1/1/2019 10:25 AM  
Dressing Status Clean, dry, & intact 1/1/2019 10:25 AM  
  
 
Opportunity for questions and clarification was provided. Patient transported with: 
 Registered Nurse

## 2019-01-01 NOTE — ED PROVIDER NOTES
Patient is a 65-year-old female who is coming in with cough congestion and fevers for the last several days. It joe been gradually getting worse. She also has developed a right-sided chest pain hurts whenever she breathes or moves. She has no prior lung problems. Specifically no COPD, asthma, and she is nonsmoker. Past Medical History:  
Diagnosis Date  Arthritis  CAD (coronary artery disease) clot in coronary artery in 2001 and it was dissolved with no further probelms.  Chronic pain   
 arthritis in knees  Dyslipidemia  GERD (gastroesophageal reflux disease)   
 controlled with med  History of basal cell cancer   
 multiple  History of peptic ulcer 2002 and 2014  
 HTN (hypertension)   
 controlled with med  Hx of colonic polyps  Staph infection 2018  
 s/p left great toenail removal  
 
 
Past Surgical History:  
Procedure Laterality Date  HX BLADDER SUSPENSION  2006  HX CATARACT REMOVAL Right 2007  
 with IOL  HX CATARACT REMOVAL Left 2012  
 with IOL  HX COLONOSCOPY    
 multiple  HX COLONOSCOPY  09/23/2014  HX ENDOSCOPY    
 multiple  HX HEART CATHETERIZATION  2001, 2002--2013  
 no intervention-normal  
 HX HYSTERECTOMY  1990  
 HX KNEE ARTHROSCOPY Left 1995  
 knee scope  HX KNEE ARTHROSCOPY Right 1996  
 knee scope  HX KNEE ARTHROSCOPY Right 2007  
 knee scope  HX LAP CHOLECYSTECTOMY  1999  
 and ERCP  
 HX LUMBAR LAMINECTOMY  1978 and 1979  
 back surg--x [place] Family History:  
Problem Relation Age of Onset  Lung Disease Father  Colon Cancer Father  Cancer Father   
     colon  Crohn's Disease Mother  Malignant Hyperthermia Neg Hx  Pseudocholinesterase Deficiency Neg Hx  Delayed Awakening Neg Hx  Post-op Nausea/Vomiting Neg Hx  Emergence Delirium Neg Hx  Post-op Cognitive Dysfunction Neg Hx   
 Other Neg Hx Social History Socioeconomic History  Marital status:  Spouse name: Not on file  Number of children: Not on file  Years of education: Not on file  Highest education level: Not on file Social Needs  Financial resource strain: Not on file  Food insecurity - worry: Not on file  Food insecurity - inability: Not on file  Transportation needs - medical: Not on file  Transportation needs - non-medical: Not on file Occupational History  Not on file Tobacco Use  Smoking status: Never Smoker  Smokeless tobacco: Never Used Substance and Sexual Activity  Alcohol use: Yes Alcohol/week: 1.5 oz Types: 3 Glasses of wine per week  Drug use: No  
 Sexual activity: Not on file Other Topics Concern  Not on file Social History Narrative  Not on file ALLERGIES: Adhesive; Amoxicillin; Iodine; Iodine and iodide containing products; Penicillin; Shellfish containing products; Shellfish derived; Adhesive tape-silicones; Bactrim [sulfamethoprim ss]; Ciprofloxacin; Fish oil [docosahexanoic acid-epa]; Nsaids (non-steroidal anti-inflammatory drug); Omega 3 [omega-3 fatty acids]; Statins-hmg-coa reductase inhibitors; Sulfamethoxazole-trimethoprim; Tree nut; and Vit e-nonoxynol 9-aloe vera Review of Systems Constitutional: Negative. Negative for chills and fever. Respiratory: Negative for cough, wheezing and stridor. Cardiovascular: Negative for chest pain and palpitations. Gastrointestinal: Negative for abdominal pain, diarrhea, nausea and vomiting. Genitourinary: Negative for difficulty urinating and dysuria. Musculoskeletal: Negative for arthralgias, gait problem, neck pain and neck stiffness. Skin: Negative. Negative for color change and wound. All other systems reviewed and are negative. Vitals:  
 01/01/19 0900 01/01/19 1025 01/01/19 1039 BP: 116/58 Pulse: (!) 110 95 Resp: 20 Temp: 99.4 °F (37.4 °C) SpO2: 94% 96% 96% Weight: 77.1 kg (170 lb) Height: 5' 8\" (1.727 m) Physical Exam  
Constitutional: She is oriented to person, place, and time. She appears well-developed and well-nourished. No distress. HENT:  
Head: Normocephalic and atraumatic. Nose: Nose normal.  
Mouth/Throat: Uvula is midline. Dry mucous membranes. Eyes: Conjunctivae and EOM are normal. Pupils are equal, round, and reactive to light. No scleral icterus. Neck: Normal range of motion. Cardiovascular: Normal rate, regular rhythm and normal heart sounds. Exam reveals no gallop and no friction rub. No murmur heard. Pulmonary/Chest: Effort normal. No stridor. No respiratory distress. She has no rhonchi. She has no rales. Scattered rhonchi. Abdominal: Soft. She exhibits no distension and no mass. There is no tenderness. There is no guarding. Neurological: She is alert and oriented to person, place, and time. Skin: Skin is warm. Capillary refill takes less than 2 seconds. No rash noted. She is not diaphoretic. No erythema. Psychiatric: She has a normal mood and affect. Her behavior is normal.  
Nursing note and vitals reviewed. MDM Number of Diagnoses or Management Options Community acquired pneumonia of right middle lobe of lung [facility]):  
Diagnosis management comments: Patient has pneumonia seen on her chest x-ray. She also appears dehydrated and does not look well. White count is very elevated lactic acid is normal.  I've given her IV antibiotics and treating her for community-acquired pneumonia. Pasquale Chavarria MD; 1/1/2019 @2:28 PM Voice dictation software was used during the making of this note. This software is not perfect and grammatical and other typographical errors may be present. This note has not been proofread for errors. 
=================================================================== Amount and/or Complexity of Data Reviewed Clinical lab tests: ordered and reviewed (Results for orders placed or performed during the hospital encounter of 01/01/19 
-CBC WITH AUTOMATED DIFF Result                      Value             Ref Range WBC                         26.7 (H)          4.3 - 11.1 K* 
     RBC                         5.04              4.05 - 5.2 M* HGB                         15.0              11.7 - 15.4 * HCT                         45.3              35.8 - 46.3 % MCV                         89.9              79.6 - 97.8 * MCH                         29.8              26.1 - 32.9 * MCHC                        33.1              31.4 - 35.0 * RDW                         13.7              11.9 - 14.6 % PLATELET                    202               150 - 450 K/* MPV                         10.6              9.4 - 12.3 FL ABSOLUTE NRBC               0.00              0.0 - 0.2 K/* NEUTROPHILS                 91 (H)            43 - 78 % LYMPHOCYTES                 4 (L)             13 - 44 % MONOCYTES                   3 (L)             4.0 - 12.0 % EOSINOPHILS                 0 (L)             0.5 - 7.8 % BASOPHILS                   1                 0.0 - 2.0 % IMMATURE GRANULOCYTES       1                 0.0 - 5.0 %   
     ABS. NEUTROPHILS            24.2 (H)          1.7 - 8.2 K/* ABS. LYMPHOCYTES            1.1               0.5 - 4.6 K/* ABS. MONOCYTES              0.8               0.1 - 1.3 K/* ABS. EOSINOPHILS            0.0               0.0 - 0.8 K/* ABS. BASOPHILS              0.3 (H)           0.0 - 0.2 K/* ABS. IMM. GRANS.            0.3               0.0 - 0.5 K/* 
     RBC COMMENTS                                                
 NORMOCYTIC/NORMOCHROMIC 
     WBC COMMENTS Result Confirmed By Smear PLATELET COMMENTS           ADEQUATE                        
     DF                          AUTOMATED                       
-METABOLIC PANEL, COMPREHENSIVE Result                      Value             Ref Range Sodium                      134 (L)           136 - 145 mm* Potassium                   3.2 (L)           3.5 - 5.1 mm* Chloride                    97 (L)            98 - 107 mmo* CO2                         26                21 - 32 mmol* Anion gap                   11                mmol/L Glucose                     109 (H)           65 - 100 mg/* BUN                         39 (H)            8 - 23 MG/DL Creatinine                  1.06 (H)          0.6 - 1.0 MG* 
     GFR est AA                  >60               >60 ml/min/1* GFR est non-AA              53                ml/min/1.73m2 Calcium                     9.2               8.3 - 10.4 M* Bilirubin, total            0.6               0.2 - 1.1 MG* ALT (SGPT)                  30                12 - 65 U/L   
     AST (SGOT)                  17                15 - 37 U/L Alk. phosphatase            251 (H)           50 - 130 U/L Protein, total              7.9               g/dL Albumin                     3.0 (L)           3.2 - 4.6 g/* Globulin                    4.9 (H)           2.3 - 3.5 g/* A-G Ratio                   0.6 -POC LACTIC ACID Result                      Value             Ref Range Lactic Acid (POC)           1.36              0.5 - 1.9 mm* 
-POC TROPONIN-I Result                      Value             Ref Range Troponin-I (POC)            0 (L)             0.02 - 0.05 * 
-EKG, 12 LEAD, INITIAL Result                      Value             Ref Range      Ventricular Rate            95                BPM           
 Atrial Rate                 94                BPM           
     P-R Interval                112               ms            
     QRS Duration                84                ms Q-T Interval                328               ms            
     QTC Calculation (Bezet)     412               ms            
     Calculated P Axis           94                degrees Calculated R Axis           77                degrees Calculated T Axis           65                degrees Diagnosis                                                   
 !! AGE AND GENDER SPECIFIC ECG ANALYSIS !! Normal sinus rhythm Normal ECG When compared with ECG of 26-JUL-2013 06:48, 
 Vent. rate has increased BY  38 BPM 
  ) Tests in the radiology section of CPT®: ordered and reviewed (Xr Chest Pa Lat Result Date: 1/1/2019 PA and lateral chest radiographs History: right sided chest pain cough fever, 78 years Female Comparison: Chest radiograph July 25, 2013 Findings:  Partially obscured cardiomediastinal silhouette. There is dense consolidation of most of the right middle lobe, consistent with acute right middle lobar pneumonia. No evidence of pneumothorax, pleural effusion. Visualized soft tissue and osseous structures otherwise unremarkable. Impression:  Acute right middle lobar pneumonia. ) Procedures

## 2019-01-01 NOTE — PROGRESS NOTES
TRANSFER - IN REPORT: 
 
Verbal report received from [facility], [address] [place] [address] on [employer]  being received from ED for routine progression of care Report consisted of patients Situation, Background, Assessment and  
Recommendations(SBAR). Information from the following report(s) SBAR, Kardex, ED Summary, [facility] and Recent Results was reviewed with the receiving nurse. Opportunity for questions and clarification was provided. Assessment completed upon patients arrival to unit and care assumed.

## 2019-01-01 NOTE — PROGRESS NOTES
Admisssion Assessment - Patient oriented to floor. Patient is alert and oriented x4. No complaint of pain except for with cough. Patient ambulates without difficulty. One person assist with IV equipment and SCDs. Skin is intact with some ecchymosis on bilateral lower extremities. Respirations are even and unlabored. Shortness of breath upon exertion. Lung sounds diminished. Currently resting in a low, locked bed with call light within reach.

## 2019-01-01 NOTE — ED TRIAGE NOTES
Pt staes bronchitis with cough x1 week. States she feels dehydrated and c\o pain in chest with inhalation.

## 2019-01-01 NOTE — H&P
HOSPITALIST H&P/CONSULTNAME:  Steve Delong Solder Age:  77 y.o. 
:   1940 MRN:   [id number] PCP: Deejay Donnelly MD 
Consulting MD: Treatment Team: Attending Provider: David Russo MD; Primary Nurse: Leora Gibbs RN 
HPI:  
Patient is a 77years old female with pmhx of CAD, arthritis, GERD, dyslipidemia, HTN presented with 3 days hx of cough, congestion and right sided chest pain. Pt was in her usual health until 3 days ago, she developed sudden onset of cough with was associated with congestion and sputum production. She also noticed right sided chest pain which is worse with deep breathing and coughing. She described it as sharp pain, 6-8/10 in severity, piercing. She endorsed fever 101 (at home), 1 episode of diarrhea, generalized weakness/aches and poor oral intake. She denies sore throat, rhinorrhea, nausea/vomiting, abdominal pain, headache, lightheadedness/dizziness. In ER, CXR showed right ML PNA. WBC 26K, K 3.2, Cr 1.06, EKG NSR Complete ROS done and is as stated in HPI or otherwise negative Past Medical History:  
Diagnosis Date  Arthritis  CAD (coronary artery disease) clot in coronary artery in  and it was dissolved with no further probelms.  Chronic pain   
 arthritis in knees  Dyslipidemia  GERD (gastroesophageal reflux disease)   
 controlled with med  History of basal cell cancer   
 multiple  History of peptic ulcer  and   
 HTN (hypertension)   
 controlled with med  Hx of colonic polyps  Staph infection 2018  
 s/p left great toenail removal  
  
Past Surgical History:  
Procedure Laterality Date  HX BLADDER SUSPENSION  2006  HX CATARACT REMOVAL Right 2007  
 with IOL  HX CATARACT REMOVAL Left   
 with IOL  HX COLONOSCOPY    
 multiple  HX COLONOSCOPY  2014  HX ENDOSCOPY    
 multiple  HX HEART CATHETERIZATION  , --2013  
 no intervention-normal  
  HX HYSTERECTOMY  1990  
 HX KNEE ARTHROSCOPY Left 1995  
 knee scope  HX KNEE ARTHROSCOPY Right 1996  
 knee scope  HX KNEE ARTHROSCOPY Right 2007  
 knee scope  HX LAP CHOLECYSTECTOMY  1999  
 and ERCP  
 HX LUMBAR LAMINECTOMY  1978 and 1979  
 back surg--x [facility] Prior to Admission Medications Prescriptions Last Dose Informant Patient Reported? Taking?  
acetaminophen (TYLENOL ARTHRITIS PAIN) 650 mg CR tablet   Yes No  
Sig: Take 650 mg by mouth every six (6) hours as needed for Pain. aspirin 81 mg chewable tablet   Yes No  
Sig: Take 81 mg by mouth every morning. Indications: continue/ take on the dos  
cranberry 400 mg cap   No No  
Sig: Take 1 Cap by mouth three (3) times daily. dicyclomine (BENTYL) 10 mg capsule   Yes No  
Sig: Take 10 mg by mouth as needed. hydrOXYzine HCl (ATARAX) 10 mg tablet   Yes No  
Sig: prn  
isosorbide mononitrate ER (IMDUR) 30 mg tablet   No No  
Sig: Take 1 Tab by mouth daily. lisinopril (PRINIVIL, ZESTRIL) 10 mg tablet   No No  
Sig: Take 1 Tab by mouth every morning. meloxicam (MOBIC) 7.5 mg tablet   No No  
Sig: Take 1-2 Tabs by mouth daily as needed for Pain. methenamine hippurate (HIPREX) 1 gram tablet   No No  
Sig: Take 1 Tab by mouth two (2) times daily (with meals). nadolol (CORGARD) 20 mg tablet   No No  
Sig: Take 1 Tab by mouth nightly. nitroglycerin (NITROSTAT) 0.4 mg SL tablet   No No  
Sig: DISSOLVE 1 TABLET UNDER TONGUE EVERY 5 MINUTES AS NEEDED FOR CHEST PAIN  
oxybutynin chloride XL (DITROPAN XL) 10 mg CR tablet   Yes No  
Sig: Take 10 mg by mouth daily. pantoprazole (PROTONIX) 40 mg tablet   Yes No  
Sig: Take 40 mg by mouth as needed. sertraline (ZOLOFT) 50 mg tablet   No No  
Sig: Take 1/2 tab at bedtime  
triamterene-hydroCHLOROthiazide (DYAZIDE) 37.5-25 mg per capsule   Yes No  
Sig: Take  by mouth as needed. Facility-Administered Medications: None Allergies Allergen Reactions  Adhesive Hives  Amoxicillin Swelling and Anaphylaxis Other reaction(s): Lips/Mouth swelling-A  
 Iodine Rash Other reaction(s): Hives/Swelling-A  
 Iodine And Iodide Containing Products Rash  Penicillin Rash  Shellfish Containing Products Hives  Shellfish Derived Anaphylaxis  Adhesive Tape-Silicones Other (comments) and Rash Blisters; PAPER TAPE OK 
blisters  Bactrim [Sulfamethoprim Ss] Unknown (comments) Leg cramps  Ciprofloxacin Nausea Only, Myalgia and Other (comments) Other reaction(s): Joint soreness-I  
 Fish Oil [Docosahexanoic Acid-Epa] Rash  Nsaids (Non-Steroidal Anti-Inflammatory Drug) Other (comments) Bowel obstruction  Omega 3 [Omega-3 Fatty Acids] Hives  Statins-Hmg-Coa Reductase Inhibitors Other (comments) Leg cramps  Sulfamethoxazole-Trimethoprim Myalgia  Tree Nut Hives  Vit E-Nonoxynol 9-Aloe Vera Hives Social History Tobacco Use  Smoking status: Never Smoker  Smokeless tobacco: Never Used Substance Use Topics  Alcohol use: Yes Alcohol/week: 1.5 oz Types: 3 Glasses of wine per week Family History Problem Relation Age of Onset  Lung Disease Father  Colon Cancer Father  Cancer Father   
     colon  Crohn's Disease Mother  Malignant Hyperthermia Neg Hx  Pseudocholinesterase Deficiency Neg Hx  Delayed Awakening Neg Hx  Post-op Nausea/Vomiting Neg Hx  Emergence Delirium Neg Hx  Post-op Cognitive Dysfunction Neg Hx   
 Other Neg Hx Objective:  
 
Visit Vitals /58 (BP 1 Location: Left arm, BP Patient Position: At rest;Sitting) Pulse 95 Temp 99.4 °F (37.4 °C) Resp 20 Ht 5' 8\" (1.727 m) Wt 77.1 kg (170 lb) SpO2 96% BMI 25.85 kg/m² Temp (24hrs), Av.4 °F (37.4 °C), Min:99.4 °F (37.4 °C), Max:99.4 °F (37.4 °C) Oxygen Therapy O2 Sat (%): 96 % (19 1039) Pulse via Oximetry: 109 beats per minute (19 1039) O2 Device: Room air (01/01/19 1039) Physical Exam: 
General:    Alert, cooperative, no distress, appears stated age. Head:   Normocephalic, without obvious abnormality, atraumatic. Nose:  Nares normal. No drainage or sinus tenderness. Lungs:   Bronchial breath sounds on right side (posteriorly), otherwise clear Heart:   Regular rate and rhythm,  no murmur, rub or gallop. Abdomen:   Soft, non-tender. Not distended. Bowel sounds normal.  
Extremities: No cyanosis. No edema. No clubbing Skin:     Texture, turgor normal. No rashes or lesions. Not Jaundiced Neurologic: GCS 15, no motor or sensory deficits, CN 2-12 intact Psych:             AO x3, mood and affect appropriate Data Review:  
Recent Results (from the past 24 hour(s)) EKG, 12 LEAD, INITIAL Collection Time: 01/01/19 10:16 AM  
Result Value Ref Range Ventricular Rate 95 BPM  
 Atrial Rate 94 BPM  
 P-R Interval 112 ms QRS Duration 84 ms Q-T Interval 328 ms QTC Calculation (Bezet) 412 ms Calculated P Axis 94 degrees Calculated R Axis 77 degrees Calculated T Axis 65 degrees Diagnosis    
  !! AGE AND GENDER SPECIFIC ECG ANALYSIS !! Normal sinus rhythm Normal ECG When compared with ECG of 26-JUL-2013 06:48, 
Vent. rate has increased BY  38 BPM 
  
CBC WITH AUTOMATED DIFF Collection Time: 01/01/19 10:21 AM  
Result Value Ref Range WBC 26.7 (H) 4.3 - 11.1 K/uL  
 RBC 5.04 4.05 - 5.2 M/uL  
 HGB 15.0 11.7 - 15.4 g/dL HCT 45.3 35.8 - 46.3 % MCV 89.9 79.6 - 97.8 FL  
 MCH 29.8 26.1 - 32.9 PG  
 MCHC 33.1 31.4 - 35.0 g/dL  
 RDW 13.7 11.9 - 14.6 % PLATELET 573 295 - 703 K/uL MPV 10.6 9.4 - 12.3 FL ABSOLUTE NRBC 0.00 0.0 - 0.2 K/uL NEUTROPHILS 91 (H) 43 - 78 % LYMPHOCYTES 4 (L) 13 - 44 % MONOCYTES 3 (L) 4.0 - 12.0 % EOSINOPHILS 0 (L) 0.5 - 7.8 % BASOPHILS 1 0.0 - 2.0 % IMMATURE GRANULOCYTES 1 0.0 - 5.0 %  
 ABS. NEUTROPHILS 24.2 (H) 1.7 - 8.2 K/UL ABS. LYMPHOCYTES 1.1 0.5 - 4.6 K/UL  
 ABS. MONOCYTES 0.8 0.1 - 1.3 K/UL  
 ABS. EOSINOPHILS 0.0 0.0 - 0.8 K/UL  
 ABS. BASOPHILS 0.3 (H) 0.0 - 0.2 K/UL  
 ABS. IMM. GRANS. 0.3 0.0 - 0.5 K/UL  
 RBC COMMENTS NORMOCYTIC/NORMOCHROMIC    
 WBC COMMENTS Result Confirmed By Smear PLATELET COMMENTS ADEQUATE    
 DF AUTOMATED METABOLIC PANEL, COMPREHENSIVE Collection Time: 01/01/19 10:21 AM  
Result Value Ref Range Sodium 134 (L) 136 - 145 mmol/L Potassium 3.2 (L) 3.5 - 5.1 mmol/L Chloride 97 (L) 98 - 107 mmol/L  
 CO2 26 21 - 32 mmol/L Anion gap 11 mmol/L Glucose 109 (H) 65 - 100 mg/dL BUN 39 (H) 8 - 23 MG/DL Creatinine 1.06 (H) 0.6 - 1.0 MG/DL  
 GFR est AA >60 >60 ml/min/1.73m2 GFR est non-AA 53 ml/min/1.73m2 Calcium 9.2 8.3 - 10.4 MG/DL Bilirubin, total 0.6 0.2 - 1.1 MG/DL  
 ALT (SGPT) 30 12 - 65 U/L  
 AST (SGOT) 17 15 - 37 U/L Alk. phosphatase 251 (H) 50 - 130 U/L Protein, total 7.9 g/dL Albumin 3.0 (L) 3.2 - 4.6 g/dL Globulin 4.9 (H) 2.3 - 3.5 g/dL A-G Ratio 0.6 POC TROPONIN-I Collection Time: 01/01/19 10:24 AM  
Result Value Ref Range Troponin-I (POC) 0 (L) 0.02 - 0.05 ng/ml POC LACTIC ACID Collection Time: 01/01/19 10:26 AM  
Result Value Ref Range Lactic Acid (POC) 1.36 0.5 - 1.9 mmol/L Imaging Marah Bender Left All diagnostic imaging personally reviewed by me. CXR: 
Comparison: Chest radiograph July 25, 2013 
  
Findings:  Partially obscured cardiomediastinal silhouette. There is dense 
consolidation of most of the right middle lobe, consistent with acute right 
middle lobar pneumonia. No evidence of pneumothorax, pleural effusion. Visualized soft tissue and osseous structures otherwise unremarkable.   
  
IMPRESSION Impression:  Acute right middle lobar pneumonia. Assessment and Plan: Active Hospital Problems Diagnosis Date Noted  Hypokalemia 01/01/2019  Community acquired bacterial pneumonia 01/01/2019  Sepsis ([facility] 01/01/2019  Acute renal failure (ARF) ([facility] 01/01/2019  Right middle lobe pneumonia ([facility] 01/01/2019 PLAN 
·  Admit for sepsis secondary to RML Pneumonia, ARF, pleuritic chest pain and hypokalemia · Empiric IV rocephin and azithromycin · F/u with blood and sputum culture · F/u with influenza scree, legionella and Strep Pneumo Ag · IV fluids  cc/hr for 48 hours · Potassium replaced, 3.2 today. Recheck in AM 
· Mucinex and tessalon for cough · Continue home meds as reconciled in MAR 
· PRN norco and morphine for pleuritic chest pain · Prn zofran for nausea/vomiting · Prn tylenol for fever · DVT prophylaxis with lovenox 
· PT/OT in AM 
 
Code Status: full High risk with opioids on board Anticipated discharge: > 2MN Signed By: Angie Devine MD   
 January 1, 2019

## 2019-01-01 NOTE — PROGRESS NOTES
Problem: Nutrition Deficit Goal: *Optimize nutritional status Nutrition Reason for assessment: Referral received from nursing admission Malnutrition Screening Tool for recently lost 24-33# without trying and eating poorly due to decreased appetite. Assessment:  
Diet order(s):  Cardiac Pt presented with cough, feeling dehydrated, c/o pain in chest with inhalation, endorses fever of 101 at home, 1 episode of diarrhea, generalized weakness and poor po intake; admitted for sepsis secondary to RML pneumonia, ARF, pleuritic chest pain and hypokalemia. Past medical history notable for CAD, HTN. Food/Nutrition Patient History:  Pt lives with spouse and patient does all meal preparation. Endorses poor po intake which started with dental work in July for new dentures; had teeth pulled and had issues with infection / dentures; lost 10# in July; since then patient reports steady weight loss and poor po intake past 2 months attributed to no appetite; may eat a bowl of soup or oatmeal or an Ensure for her meal.  Endorses weight of 194# in July, 2018. Anthropometrics:Height: 5' 8\" (172.7 cm),  Weight: 77.1 kg (170 lb), Weight Source: Patient stated, Body mass index is 25.85 kg/m². BMI class of normal range for Older American Women. Pt currently at 89% usual body weight from March 2018. Weight history per EMR:  Unable to determine if weights are actual or stated. WT / BMI WEIGHT BODY MASS INDEX  
1/1/2019 170 lb 25.85 kg/m2  
12/19/2018 177 lb 12.8 oz 27.03 kg/m2  
12/4/2018 177 lb 12.8 oz 27.03 kg/m2  
10/16/2018 185 lb 28.13 kg/m2 6/15/2018 194 lb 29.5 kg/m2 6/13/2018 192 lb 29.19 kg/m2 3/12/2018 190 lb 28.89 kg/m2 3/8/2018 190 lb 28.89 kg/m2 Pt meets ASPEN MALNUTRITION CRITERIA for Acute Illness-moderate malnutrition r/t energy intake and weigh loss. Macronutrient needs: EER:  3787-8876 kcal /day (20-25 kcal/kg BW) EPR:  64-76 grams protein/day (63-76 grams/kg IBW) GFR 53 Intake/Comparative Standards:  No meals yet; recently admitted. Nutrition Diagnosis: Inadequate oral intake r/t decreased ability to consume sufficient oral intake as evidenced by reports of poor po intake, unintentional weight loss, at 89% usual body weight from 10 months ago. Intervention: 
Meals and snacks: Continue current diet. Encouraged pt to try to eat meal prior to drinking the Ensure Supplement. Pt questioning why she is on a Cardiac diet; explained to patient that she has a history of CAD, HTN. Nutrition Supplement Therapy:  Ensure Enlive on all meal trays. Ordered Weight Discharge nutrition plan: Too soon to determine. Manjit Briceno, MPH, [address] [place] [address], [place] 
[phone]

## 2019-01-02 ENCOUNTER — HOME HEALTH ADMISSION (OUTPATIENT)
Dept: HOME HEALTH SERVICES | Facility: HOME HEALTH | Age: 79
End: 2019-01-02
Payer: MEDICARE

## 2019-01-02 PROBLEM — R78.81 GRAM-POSITIVE COCCI BACTEREMIA: Status: ACTIVE | Noted: 2019-01-02

## 2019-01-02 LAB
ANION GAP SERPL CALC-SCNC: 8 MMOL/L
BUN SERPL-MCNC: 18 MG/DL (ref 8–23)
CALCIUM SERPL-MCNC: 8.4 MG/DL (ref 8.3–10.4)
CHLORIDE SERPL-SCNC: 104 MMOL/L (ref 98–107)
CO2 SERPL-SCNC: 25 MMOL/L (ref 21–32)
CREAT SERPL-MCNC: 0.5 MG/DL (ref 0.6–1)
ERYTHROCYTE [DISTWIDTH] IN BLOOD BY AUTOMATED COUNT: 13.9 % (ref 11.9–14.6)
GLUCOSE SERPL-MCNC: 93 MG/DL (ref 65–100)
HCT VFR BLD AUTO: 36.8 % (ref 35.8–46.3)
HGB BLD-MCNC: 11.7 G/DL (ref 11.7–15.4)
MCH RBC QN AUTO: 29 PG (ref 26.1–32.9)
MCHC RBC AUTO-ENTMCNC: 31.8 G/DL (ref 31.4–35)
MCV RBC AUTO: 91.3 FL (ref 79.6–97.8)
NRBC # BLD: 0 K/UL (ref 0–0.2)
PLATELET # BLD AUTO: 249 K/UL (ref 150–450)
PMV BLD AUTO: 10.6 FL (ref 9.4–12.3)
POTASSIUM SERPL-SCNC: 4.4 MMOL/L (ref 3.5–5.1)
RBC # BLD AUTO: 4.03 M/UL (ref 4.05–5.2)
SODIUM SERPL-SCNC: 137 MMOL/L (ref 136–145)
WBC # BLD AUTO: 21.3 K/UL (ref 4.3–11.1)

## 2019-01-02 PROCEDURE — 65270000029 HC RM PRIVATE

## 2019-01-02 PROCEDURE — 85027 COMPLETE CBC AUTOMATED: CPT

## 2019-01-02 PROCEDURE — 80048 BASIC METABOLIC PNL TOTAL CA: CPT

## 2019-01-02 PROCEDURE — 87040 BLOOD CULTURE FOR BACTERIA: CPT

## 2019-01-02 PROCEDURE — 74011250637 HC RX REV CODE- 250/637: Performed by: HOSPITALIST

## 2019-01-02 PROCEDURE — 97165 OT EVAL LOW COMPLEX 30 MIN: CPT

## 2019-01-02 PROCEDURE — 74011250636 HC RX REV CODE- 250/636: Performed by: INTERNAL MEDICINE

## 2019-01-02 PROCEDURE — 74011000258 HC RX REV CODE- 258: Performed by: INTERNAL MEDICINE

## 2019-01-02 PROCEDURE — 74011250636 HC RX REV CODE- 250/636: Performed by: HOSPITALIST

## 2019-01-02 PROCEDURE — 93306 TTE W/DOPPLER COMPLETE: CPT

## 2019-01-02 PROCEDURE — 97161 PT EVAL LOW COMPLEX 20 MIN: CPT

## 2019-01-02 RX ORDER — OXYCODONE AND ACETAMINOPHEN 5; 325 MG/1; MG/1
1 TABLET ORAL EVERY 6 HOURS
Status: DISCONTINUED | OUTPATIENT
Start: 2019-01-02 | End: 2019-01-05 | Stop reason: HOSPADM

## 2019-01-02 RX ORDER — VANCOMYCIN/0.9 % SOD CHLORIDE 1.5G/250ML
1500 PLASTIC BAG, INJECTION (ML) INTRAVENOUS
Status: DISCONTINUED | OUTPATIENT
Start: 2019-01-02 | End: 2019-01-04

## 2019-01-02 RX ORDER — LISINOPRIL 5 MG/1
10 TABLET ORAL DAILY
Status: DISCONTINUED | OUTPATIENT
Start: 2019-01-03 | End: 2019-01-05 | Stop reason: HOSPADM

## 2019-01-02 RX ADMIN — VANCOMYCIN HYDROCHLORIDE 1500 MG: 10 INJECTION, POWDER, LYOPHILIZED, FOR SOLUTION INTRAVENOUS at 22:53

## 2019-01-02 RX ADMIN — VANCOMYCIN HYDROCHLORIDE 1000 MG: 1 INJECTION, POWDER, LYOPHILIZED, FOR SOLUTION INTRAVENOUS at 04:55

## 2019-01-02 RX ADMIN — POTASSIUM CHLORIDE 40 MEQ: 20 TABLET, EXTENDED RELEASE ORAL at 08:09

## 2019-01-02 RX ADMIN — VANCOMYCIN HYDROCHLORIDE 1000 MG: 1 INJECTION, POWDER, LYOPHILIZED, FOR SOLUTION INTRAVENOUS at 06:15

## 2019-01-02 RX ADMIN — SERTRALINE HYDROCHLORIDE 25 MG: 50 TABLET ORAL at 17:19

## 2019-01-02 RX ADMIN — BENZONATATE 100 MG: 100 CAPSULE ORAL at 13:42

## 2019-01-02 RX ADMIN — GUAIFENESIN 600 MG: 600 TABLET, EXTENDED RELEASE ORAL at 08:09

## 2019-01-02 RX ADMIN — OXYBUTYNIN CHLORIDE 10 MG: 5 TABLET, EXTENDED RELEASE ORAL at 08:09

## 2019-01-02 RX ADMIN — ISOSORBIDE MONONITRATE 30 MG: 30 TABLET, EXTENDED RELEASE ORAL at 08:09

## 2019-01-02 RX ADMIN — BENZONATATE 100 MG: 100 CAPSULE ORAL at 08:09

## 2019-01-02 RX ADMIN — Medication 10 ML: at 05:34

## 2019-01-02 RX ADMIN — OXYCODONE AND ACETAMINOPHEN 1 TABLET: 5; 325 TABLET ORAL at 01:13

## 2019-01-02 RX ADMIN — METOPROLOL SUCCINATE 25 MG: 25 TABLET, EXTENDED RELEASE ORAL at 08:10

## 2019-01-02 RX ADMIN — SODIUM CHLORIDE 1 G: 900 INJECTION, SOLUTION INTRAVENOUS at 08:14

## 2019-01-02 RX ADMIN — PANTOPRAZOLE SODIUM 40 MG: 40 TABLET, DELAYED RELEASE ORAL at 08:09

## 2019-01-02 RX ADMIN — OXYCODONE AND ACETAMINOPHEN 1 TABLET: 5; 325 TABLET ORAL at 21:10

## 2019-01-02 RX ADMIN — SODIUM CHLORIDE 125 ML/HR: 900 INJECTION, SOLUTION INTRAVENOUS at 16:26

## 2019-01-02 RX ADMIN — GUAIFENESIN 600 MG: 600 TABLET, EXTENDED RELEASE ORAL at 21:10

## 2019-01-02 RX ADMIN — ASPIRIN 81 MG 81 MG: 81 TABLET ORAL at 08:09

## 2019-01-02 RX ADMIN — BENZONATATE 100 MG: 100 CAPSULE ORAL at 21:10

## 2019-01-02 RX ADMIN — Medication 10 ML: at 13:42

## 2019-01-02 RX ADMIN — POTASSIUM CHLORIDE 40 MEQ: 20 TABLET, EXTENDED RELEASE ORAL at 17:19

## 2019-01-02 RX ADMIN — SODIUM CHLORIDE 125 ML/HR: 900 INJECTION, SOLUTION INTRAVENOUS at 03:44

## 2019-01-02 RX ADMIN — ENOXAPARIN SODIUM 40 MG: 40 INJECTION SUBCUTANEOUS at 16:27

## 2019-01-02 RX ADMIN — OXYCODONE AND ACETAMINOPHEN 1 TABLET: 5; 325 TABLET ORAL at 10:38

## 2019-01-02 NOTE — PROGRESS NOTES
Problem: Self Care Deficits Care Plan (Adult) Goal: *Acute Goals and Plan of Care (Insert Text) 1. Patient will complete lower body bathing and dressing, independently GOAL MET 1/2/2019.  
2. Patient will complete toileting, independently GOAL MET 1/2/2019.  
3. Patient will complete functional transfers, independently GOAL MET 1/2/2019 Timeframe: 1 visits OCCUPATIONAL THERAPY: Initial Assessment and Discharge 1/2/2019INPATIENT: Hospital Day: 2 [place] / [place] / Product Type: Medicare /  
  
NAME/AGE/GENDER: Loki Mrashall is a 77 y.o. female PRIMARY DIAGNOSIS:  Sepsis ([facility] [address] Right middle lobe pneumonia ([facility] [address] Community acquired bacterial pneumonia Hypokalemia Acute renal failure (ARF) (HCC) <principal problem not specified> <principal problem not specified> 
 
  
ICD-10: Treatment Diagnosis:  
 · Other lack of cordination (R27.8) Precautions/Allergies: 
   Adhesive; Amoxicillin; Iodine; Iodine and iodide containing products; Penicillin; Shellfish containing products; Shellfish derived; Adhesive tape-silicones; Bactrim [sulfamethoprim ss]; Ciprofloxacin; Fish oil [docosahexanoic acid-epa]; Nsaids (non-steroidal anti-inflammatory drug); Omega 3 [omega-3 fatty acids]; Statins-hmg-coa reductase inhibitors; Sulfamethoxazole-trimethoprim; Tree nut; and Vit e-nonoxynol 9-aloe vera ASSESSMENT:  
Ms. Amanda Kilgore presents with sepsis secondary to pneumonia. Her main complaint is chest and rib pain secondary to congestion and coughing. She is up in chair and moving around room well. She demonstrates independence with ADL's and transfers using no device. She does not need continued skilled OT at this time and will be able to return home when medically stable. D/C OT. This section established at most recent assessment PROBLEM LIST (Impairments causing functional limitations): 1.   
 INTERVENTIONS PLANNED: (Benefits and precautions of occupational therapy have been discussed with the patient.) 1. TREATMENT PLAN: Frequency/Duration: Follow patient evaluation only to address above goals. Rehabilitation Potential For Stated Goals: Excellent RECOMMENDED REHABILITATION/EQUIPMENT: (at time of discharge pending progress): Due to the probability of continued deficits (see above) this patient will not likely need continued skilled occupational therapy after discharge. Equipment:  
? None at this time OCCUPATIONAL PROFILE AND HISTORY:  
History of Present Injury/Illness (Reason for Referral): 
See H&P Past Medical History/Comorbidities: Ms. Simon Ward  has a past medical history of Arthritis, CAD (coronary artery disease), Chronic pain, Dyslipidemia, GERD (gastroesophageal reflux disease), History of basal cell cancer, History of peptic ulcer, HTN (hypertension), colonic polyps, and Staph infection. Ms. Simon Ward  has a past surgical history that includes hx hysterectomy (1990); hx tubal ligation (1997); hx lap cholecystectomy (1999); hx lumbar laminectomy (1978 and 1979); hx bladder suspension (2006); hx endoscopy; hx colonoscopy; hx heart catheterization (2001, 2002--2013); hx knee arthroscopy (Left, 1995); hx knee arthroscopy (Right, 1996); hx knee arthroscopy (Right, 2007); hx cataract removal (Right, 2007); hx cataract removal (Left, 2012); hx colonoscopy (09/23/2014); HAND CARPAL TUNNEL RELEASE ENDOSCOPIC RIGHT (Right, 3/12/2018); ESOPHAGOGASTRODUODENOSCOPY (EGD) (N/A, 7/6/2016); ESOPHAGEAL DILATION WITH FLUOROSCOPY (N/A, 7/6/2016); ESOPHAGOGASTRODUODENAL (EGD) BIOPSY (N/A, 7/6/2016); ENDOSCOPIC ULTRASOUND (EUS)  BMI 29 (N/A, 5/18/2016); ESOPHAGOGASTRODUODENOSCOPY (EGD) (N/A, 5/18/2016); ESOPHAGEAL DILATION (N/A, 5/18/2016); ESOPHAGOGASTRODUODENAL (EGD) BIOPSY (N/A, 5/18/2016); ENDOSCOPIC ULTRASOUND (EUS) (N/A, 2/4/2015); ESOPHAGOGASTRODUODENOSCOPY (EGD) (N/A, 7/26/2013); and ESOPHAGOGASTRODUODENAL (EGD) BIOPSY (N/A, 7/26/2013). Social History/Living Environment:  
Home Environment: Private residence # Steps to Enter: 1 One/Two Story Residence: Two story, live on 1st floor Living Alone: No 
Support Systems: Spouse/Significant Other/Partner Patient Expects to be Discharged to[de-identified] Private residence Current DME Used/Available at Home: Cane, straight Prior Level of Function/Work/Activity: 
Independent prior with ADL's, IADL's, driving, and walking. Number of Personal Factors/Comorbidities that affect the Plan of Care: Brief history (0):  LOW COMPLEXITY ASSESSMENT OF OCCUPATIONAL PERFORMANCE[de-identified]  
Activities of Daily Living:  
Basic ADLs (From Assessment) Complex ADLs (From Assessment) Feeding: Independent Oral Facial Hygiene/Grooming: Independent Bathing: Independent Upper Body Dressing: Independent Lower Body Dressing: Independent Toileting: Independent Grooming/Bathing/Dressing Activities of Daily Living Functional Transfers Bathroom Mobility: Independent Toilet Transfer : Independent Shower Transfer: Independent Bed/Mat Mobility Supine to Sit: Independent Sit to Supine: Independent Sit to Stand: Independent Bed to Chair: Independent Scooting: Independent Most Recent Physical Functioning:  
Gross Assessment: 
  
         
  
Posture: 
  
Balance: 
Sitting: Intact Standing: Intact Bed Mobility: 
Supine to Sit: Independent Sit to Supine: Independent Scooting: Independent Wheelchair Mobility: 
  
Transfers: 
Sit to Stand: Independent Stand to Sit: Independent Bed to Chair: Independent Patient Vitals for the past 6 hrs: 
 BP BP Patient Position SpO2 Pulse 01/02/19 0800 151/86 At rest 92 % 76 Mental Status Neurologic State: Alert Orientation Level: Oriented X4 Cognition: Appropriate decision making Perception: Appears intact Perseveration: No perseveration noted Physical Skills Involved: 1. Activity Tolerance Cognitive Skills Affected (resulting in the inability to perform in a timely and safe manner): 
1. [facility] Psychosocial Skills Affected: 
1. WFL Number of elements that affect the Plan of Care: 1-3:  LOW COMPLEXITY CLINICAL DECISION MAKING:  
[employer] AM-PAC 6 Clicks Daily Activity Inpatient Short Form How much help from another person does the patient currently need. .. Total A Lot A Little None 1. Putting on and taking off regular lower body clothing? [] 1   [] 2   [] 3   [x] 4  
2. Bathing (including washing, rinsing, drying)? [] 1   [] 2   [] 3   [x] 4  
3. Toileting, which includes using toilet, bedpan or urinal?   [] 1   [] 2   [] 3   [x] 4  
4. Putting on and taking off regular upper body clothing? [] 1   [] 2   [] 3   [x] 4  
5. Taking care of personal grooming such as brushing teeth? [] 1   [] 2   [] 3   [x] 4  
6. Eating meals? [] 1   [] 2   [] 3   [x] 4  
© 2007, Trustees of [facility] [employer], under license to Katrina Marroquin. All rights reserved Score:  Initial: 24 Most Recent: X (Date: -- ) Interpretation of Tool:  Represents activities that are increasingly more difficult (i.e. Bed mobility, Transfers, Gait). Score 24 23 22-20 19-15 14-10 9-7 6 Modifier CH CI CJ CK CL CM CN   
 
? Self Care:  
  - CURRENT STATUS: CH - 0% impaired, limited or restricted  - GOAL STATUS: CH - 0% impaired, limited or restricted  - D/C STATUS:  CH - 0% impaired, limited or restricted [place] / Product Type: Medicare /   
 
  
Use of outcome tool(s) and clinical judgement create a POC that gives a: LOW COMPLEXITY  
 
 
 
TREATMENT:  
(In addition to Assessment/Re-Assessment sessions the following treatments were rendered) Pre-treatment Symptoms/Complaints:   
Pain: Initial:  
Pain Intensity 1: 3 Pain Location 1: Chest, Rib cage  Post Session:  3 Assessment/Reassessment only, no treatment provided today Braces/Orthotics/Lines/Etc:  
· IV 
· O2 Device: Room air Treatment/Session Assessment:   
· Response to Treatment:  Good · Interdisciplinary Collaboration:  
o Physical Therapist 
o Occupational Therapist 
o Registered Nurse · After treatment position/precautions:  
o Up in chair 
o Bed/Chair-wheels locked 
o Call light within reach 
o RN notified · Compliance with Program/Exercises: Compliant all of the time. · Recommendations/Intent for next treatment session:  D/C OT for acute deficits. Total Treatment Duration: OT Patient Time In/Time Out Time In: 0780 Time Out: 0900 Eh Zazueta OT

## 2019-01-02 NOTE — PROGRESS NOTES
Shift Assessment - Patient is alert and oriented x4. New orders given to make Percocet scheduled every 6 hours. Patient's pain being controlled. Up to shower and back to bed. Respirations even and unlabored. Shortness of breath upon exertion. Currently resting in a low, locked bed with call light within reach.

## 2019-01-02 NOTE — PROGRESS NOTES
Problem: Interdisciplinary Rounds Goal: Interdisciplinary Rounds Interdisciplinary team rounds were held 1/2/2019 with the following team members:Care Management, Nursing, Nutrition, Pharmacy, Physical Therapy and Physician and the patient was asleep. Plan of care discussed. See clinical pathway and/or care plan for interventions and desired outcomes.

## 2019-01-02 NOTE — PROGRESS NOTES
Vancomycin Consult MD ordering: Nora Ochoa ID following? no Indication: Bloodstream infection DOT:  ?? days Goal level(s): 15 - 20 Ht Readings from Last 1 Encounters:  
19 5' 8\" (1.727 m) Wt Readings from Last 1 Encounters:  
19 77.1 kg (170 lb) Temp (24hrs), Av.6 °F (37 °C), Min:98 °F (36.7 °C), Max:99.6 °F (37.6 °C) Dosing weight: 77 kg 
77 y.o. Date:  Dose/Freq Admin Times Level/Time:  
 1 gm IV x 2 doses (2 gm total loading dose) 0455, 0615   
 1500 mg IV q18h (2300) Recent Labs 19 
0532 19 
1026 19 
1021 BUN 18  --  39* CREA 0.50*  --  1.06* WBC 21.3*  --  26.7*  
PCT  --   --  6.9 LACPOC  --  1.36  --   
 
Estimated Creatinine Clearance: 99.7 mL/min (A) (based on SCr of 0.5 mg/dL (L)). Corrected CrCl ~ 50 ml/min Day 1 Assessment and Plan: 
Vancomycin dose initiated at 1 gm IV x 2 doses (2 gm total loading dose). Vancomycin dose continued at 1500 mg IV every 18 hours. Next dose is due today () at 2300. Pharmacy will continue to follow. Thank you, Jossy Mayers, PharmD

## 2019-01-02 NOTE — PROGRESS NOTES
HOSPITALIST PROGRESS NOTE 
NAME:  Kianna Lockhart Solder Age:  77 y.o. 
:   1940 MRN:   [id number] PCP: Oscar Loo MD 
Consulting MD: Treatment Team: Attending Provider: Mendy Weems MD; Utilization Review: Kayleigh Donis RN 
SUBJECTIVE:  
Patient is a 77years old female with pmhx of CAD, arthritis, GERD, dyslipidemia, HTN admitted for sepsis due to RML community acquired pneumonia. Pt was initially started on IV Rocephin and Azithromycin. Blood cultures were positive for GPC, for which Abx switched to IV Cefepime and Vancomycin. In ER, CXR showed right ML PNA. WBC 26K, K 3.2, Cr 1.06, EKG NSR. 
 
19: 
Pt reports feeling little better. She still has 6-7/10 right sided chest pain on coughing and deep breathing. Gets better with percocet. Still has cough and sputum production, clearing. Discussed about blood culture results and change of antibiotics. She is making urin, passed 300 cc this AM 
No nausea/vomiting, diarrhea, fever. 10 point ROS negative except what mentioned above. Prior to Admission Medications Prescriptions Last Dose Informant Patient Reported? Taking?  
acetaminophen (TYLENOL ARTHRITIS PAIN) 650 mg CR tablet   Yes No  
Sig: Take 650 mg by mouth every six (6) hours as needed for Pain. aspirin 81 mg chewable tablet   Yes No  
Sig: Take 81 mg by mouth every morning. Indications: continue/ take on the dos  
cranberry 400 mg cap   No No  
Sig: Take 1 Cap by mouth three (3) times daily. dicyclomine (BENTYL) 10 mg capsule   Yes No  
Sig: Take 10 mg by mouth as needed. hydrOXYzine HCl (ATARAX) 10 mg tablet   Yes No  
Sig: prn  
isosorbide mononitrate ER (IMDUR) 30 mg tablet   No No  
Sig: Take 1 Tab by mouth daily. lisinopril (PRINIVIL, ZESTRIL) 10 mg tablet   No No  
Sig: Take 1 Tab by mouth every morning. meloxicam (MOBIC) 7.5 mg tablet   No No  
Sig: Take 1-2 Tabs by mouth daily as needed for Pain. methenamine hippurate (HIPREX) 1 gram tablet   No No  
Sig: Take 1 Tab by mouth two (2) times daily (with meals). nadolol (CORGARD) 20 mg tablet   No No  
Sig: Take 1 Tab by mouth nightly. nitroglycerin (NITROSTAT) 0.4 mg SL tablet   No No  
Sig: DISSOLVE 1 TABLET UNDER TONGUE EVERY 5 MINUTES AS NEEDED FOR CHEST PAIN  
oxybutynin chloride XL (DITROPAN XL) 10 mg CR tablet   Yes No  
Sig: Take 10 mg by mouth daily. pantoprazole (PROTONIX) 40 mg tablet   Yes No  
Sig: Take 40 mg by mouth as needed. sertraline (ZOLOFT) 50 mg tablet   No No  
Sig: Take 1/2 tab at bedtime  
triamterene-hydroCHLOROthiazide (DYAZIDE) 37.5-25 mg per capsule   Yes No  
Sig: Take  by mouth as needed. Objective:  
 
Visit Vitals /63 (BP 1 Location: Left arm, BP Patient Position: At rest;Supine) Pulse 62 Temp 98 °F (36.7 °C) Resp 17 Ht 5' 8\" (1.727 m) Wt 77.1 kg (170 lb) SpO2 92% Breastfeeding? No  
BMI 25.85 kg/m² Temp (24hrs), Av.9 °F (37.2 °C), Min:98 °F (36.7 °C), Max:99.6 °F (37.6 °C) Oxygen Therapy O2 Sat (%): 92 % (19 0354) Pulse via Oximetry: 109 beats per minute (19 1039) O2 Device: Room air (19 1450) Physical Exam: 
General:    Alert, cooperative, no distress, appears stated age. Head:   Normocephalic, without obvious abnormality, atraumatic. Nose:  Nares normal. No drainage or sinus tenderness. Lungs:   Bronchial breath sounds on right side (posteriorly), otherwise clear Heart:   Regular rate and rhythm,  no murmur, rub or gallop. Abdomen:   Soft, non-tender. Not distended. Bowel sounds normal.  
Extremities: No cyanosis. No edema. No clubbing Skin:     Texture, turgor normal. No rashes or lesions. Not Jaundiced Neurologic: GCS 15, no motor or sensory deficits, CN 2-12 intact Psych:             AO x3, mood and affect appropriate Data Review:  
Recent Results (from the past 24 hour(s)) EKG, 12 LEAD, INITIAL  Collection Time: 19 10:16 AM  
 Result Value Ref Range Ventricular Rate 95 BPM  
 Atrial Rate 94 BPM  
 P-R Interval 112 ms QRS Duration 84 ms Q-T Interval 328 ms QTC Calculation (Bezet) 412 ms Calculated P Axis 94 degrees Calculated R Axis 77 degrees Calculated T Axis 65 degrees Diagnosis Normal sinus rhythm Normal ECG When compared with ECG of 26-JUL-2013 06:48, 
Vent. rate has increased BY  38 BPM 
Confirmed by Jassi Burrows ([id number]) on 1/1/2019 7:09:23 PM 
  
CBC WITH AUTOMATED DIFF Collection Time: 01/01/19 10:21 AM  
Result Value Ref Range WBC 26.7 (H) 4.3 - 11.1 K/uL  
 RBC 5.04 4.05 - 5.2 M/uL  
 HGB 15.0 11.7 - 15.4 g/dL HCT 45.3 35.8 - 46.3 % MCV 89.9 79.6 - 97.8 FL  
 MCH 29.8 26.1 - 32.9 PG  
 MCHC 33.1 31.4 - 35.0 g/dL  
 RDW 13.7 11.9 - 14.6 % PLATELET 981 560 - 379 K/uL MPV 10.6 9.4 - 12.3 FL ABSOLUTE NRBC 0.00 0.0 - 0.2 K/uL NEUTROPHILS 91 (H) 43 - 78 % LYMPHOCYTES 4 (L) 13 - 44 % MONOCYTES 3 (L) 4.0 - 12.0 % EOSINOPHILS 0 (L) 0.5 - 7.8 % BASOPHILS 1 0.0 - 2.0 % IMMATURE GRANULOCYTES 1 0.0 - 5.0 %  
 ABS. NEUTROPHILS 24.2 (H) 1.7 - 8.2 K/UL  
 ABS. LYMPHOCYTES 1.1 0.5 - 4.6 K/UL  
 ABS. MONOCYTES 0.8 0.1 - 1.3 K/UL  
 ABS. EOSINOPHILS 0.0 0.0 - 0.8 K/UL  
 ABS. BASOPHILS 0.3 (H) 0.0 - 0.2 K/UL  
 ABS. IMM. GRANS. 0.3 0.0 - 0.5 K/UL  
 RBC COMMENTS NORMOCYTIC/NORMOCHROMIC    
 WBC COMMENTS Result Confirmed By Smear PLATELET COMMENTS ADEQUATE    
 DF AUTOMATED METABOLIC PANEL, COMPREHENSIVE Collection Time: 01/01/19 10:21 AM  
Result Value Ref Range Sodium 134 (L) 136 - 145 mmol/L Potassium 3.2 (L) 3.5 - 5.1 mmol/L Chloride 97 (L) 98 - 107 mmol/L  
 CO2 26 21 - 32 mmol/L Anion gap 11 mmol/L Glucose 109 (H) 65 - 100 mg/dL BUN 39 (H) 8 - 23 MG/DL Creatinine 1.06 (H) 0.6 - 1.0 MG/DL  
 GFR est AA >60 >60 ml/min/1.73m2 GFR est non-AA 53 ml/min/1.73m2 Calcium 9.2 8.3 - 10.4 MG/DL  Bilirubin, total 0.6 0.2 - 1.1 MG/DL  
 ALT (SGPT) 30 12 - 65 U/L  
 AST (SGOT) 17 15 - 37 U/L Alk. phosphatase 251 (H) 50 - 130 U/L Protein, total 7.9 g/dL Albumin 3.0 (L) 3.2 - 4.6 g/dL Globulin 4.9 (H) 2.3 - 3.5 g/dL A-G Ratio 0.6 PROCALCITONIN Collection Time: 01/01/19 10:21 AM  
Result Value Ref Range Procalcitonin 6.9 ng/mL POC TROPONIN-I Collection Time: 01/01/19 10:24 AM  
Result Value Ref Range Troponin-I (POC) 0 (L) 0.02 - 0.05 ng/ml POC LACTIC ACID Collection Time: 01/01/19 10:26 AM  
Result Value Ref Range Lactic Acid (POC) 1.36 0.5 - 1.9 mmol/L  
CULTURE, BLOOD Collection Time: 01/01/19  1:18 PM  
Result Value Ref Range Special Requests: LEFT Antecubital 
    
 GRAM STAIN GRAM POSITIVE COCCI    
 GRAM STAIN AEROBIC AND ANAEROBIC BOTTLES    
 GRAM STAIN     
  RESULTS VERIFIED, PHONED TO AND READ BACK BY TANVIR MORLEY RN AT 0464 01/02/2019 BY Juliet Casarez Culture result: CULTURE IN PROGRESS,FURTHER UPDATES TO FOLLOW CULTURE, BLOOD Collection Time: 01/01/19  1:37 PM  
Result Value Ref Range Special Requests: RIGHT 
FOREARM 
    
 GRAM STAIN GRAM POSITIVE COCCI    
 GRAM STAIN AEROBIC AND ANAEROBIC BOTTLES    
 GRAM STAIN     
  RESULTS VERIFIED, PHONED TO AND READ BACK BY TANVIR MORLEY RN AT 3896 01/02/2019 BY Juliet Casarez Culture result: CULTURE IN PROGRESS,FURTHER UPDATES TO FOLLOW METABOLIC PANEL, BASIC Collection Time: 01/02/19  5:32 AM  
Result Value Ref Range Sodium 137 136 - 145 mmol/L Potassium 4.4 3.5 - 5.1 mmol/L Chloride 104 98 - 107 mmol/L  
 CO2 25 21 - 32 mmol/L Anion gap 8 mmol/L Glucose 93 65 - 100 mg/dL BUN 18 8 - 23 MG/DL Creatinine 0.50 (L) 0.6 - 1.0 MG/DL  
 GFR est AA >60 >60 ml/min/1.73m2 GFR est non-AA >60 ml/min/1.73m2 Calcium 8.4 8.3 - 10.4 MG/DL  
CBC W/O DIFF Collection Time: 01/02/19  5:32 AM  
Result Value Ref Range WBC 21.3 (H) 4.3 - 11.1 K/uL  
 RBC 4.03 (L) 4.05 - 5.2 M/uL  
 HGB 11.7 11.7 - 15.4 g/dL HCT 36.8 35.8 - 46.3 % MCV 91.3 79.6 - 97.8 FL  
 MCH 29.0 26.1 - 32.9 PG  
 MCHC 31.8 31.4 - 35.0 g/dL  
 RDW 13.9 11.9 - 14.6 % PLATELET 919 457 - 138 K/uL MPV 10.6 9.4 - 12.3 FL ABSOLUTE NRBC 0.00 0.0 - 0.2 K/uL CULTURE, BLOOD Collection Time: 01/02/19  5:32 AM  
Result Value Ref Range Special Requests: LEFT ANTECUBITAL Culture result: PENDING   
CULTURE, BLOOD Collection Time: 01/02/19  5:38 AM  
Result Value Ref Range Special Requests: RIGHT HAND Culture result: PENDING Imaging Gayatri Cha All diagnostic imaging personally reviewed by me. CXR: 
Comparison: Chest radiograph July 25, 2013 
  
Findings:  Partially obscured cardiomediastinal silhouette. There is dense 
consolidation of most of the right middle lobe, consistent with acute right 
middle lobar pneumonia. No evidence of pneumothorax, pleural effusion. Visualized soft tissue and osseous structures otherwise unremarkable.   
  
IMPRESSION Impression:  Acute right middle lobar pneumonia. Assessment and Plan: Active Hospital Problems Diagnosis Date Noted  Hypokalemia 01/01/2019  Community acquired bacterial pneumonia 01/01/2019  Sepsis ([facility] [address] 01/01/2019  Acute renal failure (ARF) ([facility] [address] 01/01/2019  Right middle lobe pneumonia ([facility] [address] 01/01/2019 PLAN 
· Antibiotics switched from IV Rocephin/Azithro to IV cefepime and vanc in view of positive blood cultures for YANET NEVAEH [facility] · Blood culture positive for GPC · F/u with influenza scree, legionella and Strep Pneumo Ag · IV fluids  cc/hr · Hypokalemia corrected, 4.4 today · Mucinex and tessalon for cough · Making percocet q6h scheduled · Prn zofran for nausea/vomiting · Prn tylenol for fever · DVT prophylaxis with lovenox 
· PT/OT in AM 
 
Code Status: full High risk with opioids on board Dispo: pending until medically stable Signed By: aCrol Sanchez MD   
 January 2, 2019

## 2019-01-02 NOTE — PROGRESS NOTES
Shift assessment complete. A&O. No complaints at this time. Respirations even and unlabored. No distress noted. IVF infusing without difficulty.

## 2019-01-02 NOTE — PROGRESS NOTES
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 Pt admitted with sepsis PNA. Pt states wishes she would have gone to doctor sooner, as she is weak & needing lots of rest.  DARCY discussed HH and pt is agreeable to [facility]. Pt's PCP is Dr. Adela Guillermo.   DARCY made referral to Merary Gomez with [facility],

## 2019-01-02 NOTE — PROGRESS NOTES
Problem: Mobility Impaired (Adult and Pediatric) Goal: *Acute Goals and Plan of Care (Insert Text) PHYSICAL THERAPY: Initial Assessment, Discharge 1/2/2019INPATIENT: Hospital Day: 2 [place] / Product Type: Medicare /  
  
NAME/AGE/GENDER: David Clemons is a 77 y.o. female PRIMARY DIAGNOSIS: Sepsis ([facility] Right middle lobe pneumonia ([facility] Community acquired bacterial pneumonia Hypokalemia Acute renal failure (ARF) (HCC) <principal problem not specified> <principal problem not specified> 
 
  
ICD-10: Treatment Diagnosis:  
 · Unspecified Lack of Coordination (R27.9) Precaution/Allergies: 
Adhesive; Amoxicillin; Iodine; Iodine and iodide containing products; Penicillin; Shellfish containing products; Shellfish derived; Adhesive tape-silicones; Bactrim [sulfamethoprim ss]; Ciprofloxacin; Fish oil [docosahexanoic acid-epa]; Nsaids (non-steroidal anti-inflammatory drug); Omega 3 [omega-3 fatty acids]; Statins-hmg-coa reductase inhibitors; Sulfamethoxazole-trimethoprim; Tree nut; and Vit e-nonoxynol 9-aloe vera ASSESSMENT:  
Ms. Claude Sanford presents with above diagnosis. Patient initially complained of weakness and chest pain with coughing. She reports she is feeling better (\"50% better\"). Patient found up independently in the room, ambulating within the room. Patient able to get herself in and out of bed, to/from the restroom, and ambulate without assist.  She is short of breath secondary to her cough and chest pain but no mobility/balance concerns. Patient is active and independent at home. Encouraged patient to gradually work back to her prior activity. Reminded her it will take longer to recover than it did to get sick. Patient with no therapy needs at d/c. This section established at most recent assessment PROBLEM LIST (Impairments causing functional limitations): 1. Decreased Activity Tolerance 2. Increased Shortness of Breath INTERVENTIONS PLANNED: (Benefits and precautions of physical therapy have been discussed with the patient.) 1. Evaluation only TREATMENT PLAN: Frequency/Duration: Eval/discharge RECOMMENDED REHABILITATION/EQUIPMENT: (at time of discharge pending progress): Due to the probability of continued deficits (see above) this patient will not likely need continued skilled physical therapy after discharge. Equipment:  
? None at this time HISTORY:  
History of Present Injury/Illness (Reason for Referral): 
Patient is a 77years old female with pmhx of CAD, arthritis, GERD, dyslipidemia, HTN presented with 3 days hx of cough, congestion and right sided chest pain. Pt was in her usual health until 3 days ago, she developed sudden onset of cough with was associated with congestion and sputum production. She also noticed right sided chest pain which is worse with deep breathing and coughing. She described it as sharp pain, 6-8/10 in severity, piercing. She endorsed fever 101 (at home), 1 episode of diarrhea, generalized weakness/aches and poor oral intake. She denies sore throat, rhinorrhea, nausea/vomiting, abdominal pain, headache, lightheadedness/dizziness. In ER, CXR showed right ML PNA. WBC 26K, K 3.2, Cr 1.06, EKG NSR Past Medical History/Comorbidities: Ms. Washington Cabral  has a past medical history of Arthritis, CAD (coronary artery disease), Chronic pain, Dyslipidemia, GERD (gastroesophageal reflux disease), History of basal cell cancer, History of peptic ulcer, HTN (hypertension), colonic polyps, and Staph infection.   Ms. Washington Cabral  has a past surgical history that includes hx hysterectomy (1990); hx tubal ligation (1997); hx lap cholecystectomy (1999); hx lumbar laminectomy (1978 and 1979); hx bladder suspension (2006); hx endoscopy; hx colonoscopy; hx heart catheterization (2001, 2002--2013); hx knee arthroscopy (Left, 1995); hx knee arthroscopy (Right, 1996); hx knee arthroscopy (Right, 2007); hx cataract removal (Right, 2007); hx cataract removal (Left, 2012); hx colonoscopy (09/23/2014); HAND CARPAL TUNNEL RELEASE ENDOSCOPIC RIGHT (Right, 3/12/2018); ESOPHAGOGASTRODUODENOSCOPY (EGD) (N/A, 7/6/2016); ESOPHAGEAL DILATION WITH FLUOROSCOPY (N/A, 7/6/2016); ESOPHAGOGASTRODUODENAL (EGD) BIOPSY (N/A, 7/6/2016); ENDOSCOPIC ULTRASOUND (EUS)  BMI 29 (N/A, 5/18/2016); ESOPHAGOGASTRODUODENOSCOPY (EGD) (N/A, 5/18/2016); ESOPHAGEAL DILATION (N/A, 5/18/2016); ESOPHAGOGASTRODUODENAL (EGD) BIOPSY (N/A, 5/18/2016); ENDOSCOPIC ULTRASOUND (EUS) (N/A, 2/4/2015); ESOPHAGOGASTRODUODENOSCOPY (EGD) (N/A, 7/26/2013); and ESOPHAGOGASTRODUODENAL (EGD) BIOPSY (N/A, 7/26/2013). Social History/Living Environment:  
Home Environment: Private residence # Steps to Enter: 1 One/Two Story Residence: Two story, live on 1st floor Living Alone: No 
Support Systems: Spouse/Significant Other/Partner Patient Expects to be Discharged to[de-identified] Private residence Current DME Used/Available at Home: Cane, straight Prior Level of Function/Work/Activity: 
Independent Number of Personal Factors/Comorbidities that affect the Plan of Care: 0: LOW COMPLEXITY EXAMINATION:  
Most Recent Physical Functioning:  
Gross Assessment: 
AROM: Within functional limits(B LEs) Strength: Within functional limits(B LEs) Coordination: Within functional limits Tone: Normal 
Sensation: Intact Posture: 
  
Balance: 
Sitting: Intact Standing: Intact Bed Mobility: 
Supine to Sit: Independent Sit to Supine: Independent Scooting: Independent Wheelchair Mobility: 
  
Transfers: 
Sit to Stand: Independent Stand to Sit: Independent Bed to Chair: Independent Gait: 
  
Speed/Gretchen: Slow Step Length: Left shortened;Right shortened Distance (ft): 125 Feet (ft) Ambulation - Level of Assistance: Independent Body Structures Involved: 1. Lungs Body Functions Affected: 1. Respiratory Activities and Participation Affected: 1. Mobility Number of elements that affect the Plan of Care: 3: MODERATE COMPLEXITY CLINICAL PRESENTATION:  
Presentation: Stable and uncomplicated: LOW COMPLEXITY CLINICAL DECISION MAKIN [facility] [address] [address] AM-PAC 6 Clicks Basic Mobility Inpatient Short Form How much difficulty does the patient currently have. .. Unable A Lot A Little None 1. Turning over in bed (including adjusting bedclothes, sheets and blankets)? [] 1   [] 2   [] 3   [x] 4  
2. Sitting down on and standing up from a chair with arms ( e.g., wheelchair, bedside commode, etc.)   [] 1   [] 2   [] 3   [x] 4  
3. Moving from lying on back to sitting on the side of the bed? [] 1   [] 2   [] 3   [x] 4 How much help from another person does the patient currently need. .. Total A Lot A Little None 4. Moving to and from a bed to a chair (including a wheelchair)? [] 1   [] 2   [] 3   [x] 4  
5. Need to walk in hospital room? [] 1   [] 2   [] 3   [x] 4  
6. Climbing 3-5 steps with a railing? [] 1   [] 2   [] 3   [x] 4  
© , Trustees of [address] [facility] [address] [address], under license to [employer]. All rights reserved Score:  Initial: 24 Most Recent: X (Date: -- ) Interpretation of Tool:  Represents activities that are increasingly more difficult (i.e. Bed mobility, Transfers, Gait). Score 24 23 22-20 19-15 14-10 9-7 6 Modifier CH CI CJ CK CL CM CN   
 
? Mobility - Walking and Moving Around:  
  - CURRENT STATUS: CH - 0% impaired, limited or restricted  - GOAL STATUS: CH - 0% impaired, limited or restricted  - D/C STATUS:  CH - 0% impaired, limited or restricted [place] / Product Type: Medicare /   
 
Medical Necessity:    
· Skilled PT intervention not indicated. Patient is independent with all mobility.  
  
Use of outcome tool(s) and clinical judgement create a POC that gives a: Clear prediction of patient's progress: LOW COMPLEXITY  
  
 
 
 
TREATMENT:  
(In addition to Assessment/Re-Assessment sessions the following treatments were rendered) Pre-treatment Symptoms/Complaints:  Patient already up in the room independently. Pain: Initial:  
Pain Intensity 1: 4 Pain Location 1: Chest 
Pain Intervention(s) 1: Repositioned  Post Session:  4 Assessment/Reassessment only, no treatment provided today Braces/Orthotics/Lines/Etc:  
· IV 
· O2 Device: Room air Treatment/Session Assessment:   
· Response to Treatment:  Patient participated well. Easily short of breath but no mobility or balance concerns. · Interdisciplinary Collaboration:  
o Physical Therapist 
o Occupational Therapist 
o Registered Nurse · After treatment position/precautions:  
o Supine in bed 
o Bed/Chair-wheels locked 
o Call light within reach · Compliance with Program/Exercises: Compliant all of the time · Recommendations/Intent for next treatment session:  Evaluation only. No PT needs. Total Treatment Duration: PT Patient Time In/Time Out Time In: 0900 Time Out: 3611 Kevyn De La Rosa, PT

## 2019-01-02 NOTE — PROGRESS NOTES
Blood cultures reported gram positive cocci. Will switch her to broad spectrum antibiotics with  IV Vanco and IV cefepime ( allergic to PCN). Will repeat BC. ECHO ordered. Will need ID consult at some point once cultures are better typified.

## 2019-01-02 NOTE — PROGRESS NOTES
[facility] - INPATIENT Face to Face Encounter Patients Name: Loki Marshall    YOB: 1940 Ordering Physician:  Sri Proctor Primary Diagnosis: Sepsis ([facility] [address] Right middle lobe pneumonia ([facility] [address] Community acquired bacterial pneumonia Hypokalemia Acute renal failure (ARF) ([facility]) Date of Face to Face:   1/2/2019 Face to Face Encounter findings are related to primary reason for home care:   yes. 1. I certify that the patient needs intermittent care as follows: skilled nursing care:  skilled observation/assessment, patient education 2. I certify that this patient is homebound, that is: 1) patient requires the use of a indep device, special transportation, or assistance of another to leave the home; or 2) patient's condition makes leaving the home medically contraindicated; and 3) patient has a normal inability to leave the home and leaving the home requires considerable and taxing effort. Patient may leave the home for infrequent and short duration for medical reasons, and occasional absences for non-medical reasons. Homebound status is due to the following functional limitations: Patient with strength deficits limiting the performance of all ADL's without caregiver assistance or the use of an assistive device. 3. I certify that this patient is under my care and that I, or a nurse practitioner or  [id number], or clinical nurse specialist, or certified nurse midwife, working with me, had a Face-to-Face Encounter that meets the physician Face-to-Face Encounter requirements. The following are the clinical findings from the [address] [place] [address] encounter that support the need for skilled services and is a summary of the encounter:   See Progress Notes See attached progess note LESLEY Valenzuela 
1/2/2019 THE FOLLOWING TO BE COMPLETED BY THE COMMUNITY PHYSICIAN: 
 
I concur with the findings described above from the [facility] encounter that this patient is homebound and in need of a skilled service. Certifying Physician: _____________________________________ Printed Certifying Physician Name: _____________________________________ Date: _________________

## 2019-01-03 LAB
ANION GAP SERPL CALC-SCNC: 4 MMOL/L
BUN SERPL-MCNC: 11 MG/DL (ref 8–23)
CALCIUM SERPL-MCNC: 8.6 MG/DL (ref 8.3–10.4)
CHLORIDE SERPL-SCNC: 107 MMOL/L (ref 98–107)
CO2 SERPL-SCNC: 28 MMOL/L (ref 21–32)
CREAT SERPL-MCNC: 0.38 MG/DL (ref 0.6–1)
ERYTHROCYTE [DISTWIDTH] IN BLOOD BY AUTOMATED COUNT: 14.2 % (ref 11.9–14.6)
GLUCOSE SERPL-MCNC: 97 MG/DL (ref 65–100)
HCT VFR BLD AUTO: 33.5 % (ref 35.8–46.3)
HGB BLD-MCNC: 10.6 G/DL (ref 11.7–15.4)
MAGNESIUM SERPL-MCNC: 2.2 MG/DL (ref 1.8–2.4)
MCH RBC QN AUTO: 29.4 PG (ref 26.1–32.9)
MCHC RBC AUTO-ENTMCNC: 31.6 G/DL (ref 31.4–35)
MCV RBC AUTO: 92.8 FL (ref 79.6–97.8)
NRBC # BLD: 0 K/UL (ref 0–0.2)
PLATELET # BLD AUTO: 231 K/UL (ref 150–450)
PMV BLD AUTO: 10.4 FL (ref 9.4–12.3)
POTASSIUM SERPL-SCNC: 5.5 MMOL/L (ref 3.5–5.1)
RBC # BLD AUTO: 3.61 M/UL (ref 4.05–5.2)
SODIUM SERPL-SCNC: 139 MMOL/L (ref 136–145)
WBC # BLD AUTO: 11.2 K/UL (ref 4.3–11.1)

## 2019-01-03 PROCEDURE — 85027 COMPLETE CBC AUTOMATED: CPT

## 2019-01-03 PROCEDURE — 77030013140 HC MSK NEB VYRM -A

## 2019-01-03 PROCEDURE — 74011250637 HC RX REV CODE- 250/637: Performed by: HOSPITALIST

## 2019-01-03 PROCEDURE — 36415 COLL VENOUS BLD VENIPUNCTURE: CPT

## 2019-01-03 PROCEDURE — 83735 ASSAY OF MAGNESIUM: CPT

## 2019-01-03 PROCEDURE — 74011000258 HC RX REV CODE- 258: Performed by: INTERNAL MEDICINE

## 2019-01-03 PROCEDURE — 74011000250 HC RX REV CODE- 250: Performed by: INTERNAL MEDICINE

## 2019-01-03 PROCEDURE — 74011250636 HC RX REV CODE- 250/636: Performed by: HOSPITALIST

## 2019-01-03 PROCEDURE — 77030020263 HC SOL INJ SOD CL0.9% LFCR 1000ML

## 2019-01-03 PROCEDURE — 74011250636 HC RX REV CODE- 250/636: Performed by: INTERNAL MEDICINE

## 2019-01-03 PROCEDURE — 94760 N-INVAS EAR/PLS OXIMETRY 1: CPT

## 2019-01-03 PROCEDURE — 80048 BASIC METABOLIC PNL TOTAL CA: CPT

## 2019-01-03 PROCEDURE — 65270000029 HC RM PRIVATE

## 2019-01-03 PROCEDURE — 94640 AIRWAY INHALATION TREATMENT: CPT

## 2019-01-03 RX ORDER — IPRATROPIUM BROMIDE AND ALBUTEROL SULFATE 2.5; .5 MG/3ML; MG/3ML
3 SOLUTION RESPIRATORY (INHALATION)
Status: DISCONTINUED | OUTPATIENT
Start: 2019-01-03 | End: 2019-01-05 | Stop reason: HOSPADM

## 2019-01-03 RX ORDER — BUDESONIDE 0.5 MG/2ML
500 INHALANT ORAL
Status: DISCONTINUED | OUTPATIENT
Start: 2019-01-03 | End: 2019-01-05 | Stop reason: HOSPADM

## 2019-01-03 RX ADMIN — ENOXAPARIN SODIUM 40 MG: 40 INJECTION SUBCUTANEOUS at 17:18

## 2019-01-03 RX ADMIN — OXYCODONE AND ACETAMINOPHEN 1 TABLET: 5; 325 TABLET ORAL at 10:04

## 2019-01-03 RX ADMIN — METOPROLOL SUCCINATE 25 MG: 25 TABLET, EXTENDED RELEASE ORAL at 08:50

## 2019-01-03 RX ADMIN — BUDESONIDE 500 MCG: 0.5 INHALANT RESPIRATORY (INHALATION) at 20:06

## 2019-01-03 RX ADMIN — GUAIFENESIN 600 MG: 600 TABLET, EXTENDED RELEASE ORAL at 21:31

## 2019-01-03 RX ADMIN — OXYCODONE AND ACETAMINOPHEN 1 TABLET: 5; 325 TABLET ORAL at 17:17

## 2019-01-03 RX ADMIN — IPRATROPIUM BROMIDE AND ALBUTEROL SULFATE 3 ML: .5; 3 SOLUTION RESPIRATORY (INHALATION) at 20:06

## 2019-01-03 RX ADMIN — LISINOPRIL 10 MG: 5 TABLET ORAL at 08:51

## 2019-01-03 RX ADMIN — METHENAMINE HIPPURATE 1 G: 1000 TABLET ORAL at 08:00

## 2019-01-03 RX ADMIN — SODIUM CHLORIDE 125 ML/HR: 900 INJECTION, SOLUTION INTRAVENOUS at 11:16

## 2019-01-03 RX ADMIN — GUAIFENESIN 600 MG: 600 TABLET, EXTENDED RELEASE ORAL at 08:50

## 2019-01-03 RX ADMIN — OXYCODONE AND ACETAMINOPHEN 1 TABLET: 5; 325 TABLET ORAL at 03:48

## 2019-01-03 RX ADMIN — PANTOPRAZOLE SODIUM 40 MG: 40 TABLET, DELAYED RELEASE ORAL at 08:50

## 2019-01-03 RX ADMIN — OXYBUTYNIN CHLORIDE 10 MG: 5 TABLET, EXTENDED RELEASE ORAL at 08:50

## 2019-01-03 RX ADMIN — ASPIRIN 81 MG 81 MG: 81 TABLET ORAL at 08:51

## 2019-01-03 RX ADMIN — ISOSORBIDE MONONITRATE 30 MG: 30 TABLET, EXTENDED RELEASE ORAL at 08:50

## 2019-01-03 RX ADMIN — BENZONATATE 100 MG: 100 CAPSULE ORAL at 14:48

## 2019-01-03 RX ADMIN — Medication 5 ML: at 21:32

## 2019-01-03 RX ADMIN — SODIUM CHLORIDE 1 G: 900 INJECTION, SOLUTION INTRAVENOUS at 05:35

## 2019-01-03 RX ADMIN — SODIUM CHLORIDE 125 ML/HR: 900 INJECTION, SOLUTION INTRAVENOUS at 02:14

## 2019-01-03 RX ADMIN — POTASSIUM CHLORIDE 40 MEQ: 20 TABLET, EXTENDED RELEASE ORAL at 08:50

## 2019-01-03 RX ADMIN — BENZONATATE 100 MG: 100 CAPSULE ORAL at 08:51

## 2019-01-03 RX ADMIN — OXYCODONE AND ACETAMINOPHEN 1 TABLET: 5; 325 TABLET ORAL at 21:31

## 2019-01-03 RX ADMIN — BENZONATATE 100 MG: 100 CAPSULE ORAL at 21:31

## 2019-01-03 RX ADMIN — SERTRALINE HYDROCHLORIDE 25 MG: 50 TABLET ORAL at 17:17

## 2019-01-03 NOTE — PROGRESS NOTES
HOSPITALIST PROGRESS NOTENAME:  Georgi Beverly Solder Age:  77 y.o. 
:   1940 MRN:   [id number] PCP: Lupis Calzada MD 
Consulting MD: Treatment Team: Attending Provider: Hoa Gutierrez MD; Utilization Review: Marco A Clemons RN As per Prior Documentation: 
\" 
SUBJECTIVE:  
Patient is a 77years old female with pmhx of CAD, arthritis, GERD, dyslipidemia, HTN admitted for sepsis due to RML community acquired pneumonia. Pt was initially started on IV Rocephin and Azithromycin. Blood cultures were positive for GPC, for which Abx switched to IV Cefepime and Vancomycin. In ER, CXR showed right ML PNA. WBC 26K, K 3.2, Cr 1.06, EKG NSR. 
 
19: 
Pt reports feeling little better. She still has 6-7/10 right sided chest pain on coughing and deep breathing. Gets better with percocet. Still has cough and sputum production, clearing. Discussed about blood culture results and change of antibiotics. She is making urin, passed 300 cc this AM 
No nausea/vomiting, diarrhea, fever. \" 
 
 
2018- Pt seen - doing ok. On oxygen which she put on because she was sob- taking standing pain meds and feels this is helping. Cultures pending. Coughing up more stuff. 10 point ROS negative except what mentioned above. Prior to Admission Medications Prescriptions Last Dose Informant Patient Reported? Taking?  
acetaminophen (TYLENOL ARTHRITIS PAIN) 650 mg CR tablet   Yes No  
Sig: Take 650 mg by mouth every six (6) hours as needed for Pain. aspirin 81 mg chewable tablet   Yes No  
Sig: Take 81 mg by mouth every morning. Indications: continue/ take on the dos  
cranberry 400 mg cap   No No  
Sig: Take 1 Cap by mouth three (3) times daily. dicyclomine (BENTYL) 10 mg capsule   Yes No  
Sig: Take 10 mg by mouth as needed. hydrOXYzine HCl (ATARAX) 10 mg tablet   Yes No  
Sig: prn  
isosorbide mononitrate ER (IMDUR) 30 mg tablet   No No  
Sig: Take 1 Tab by mouth daily. lisinopril (PRINIVIL, ZESTRIL) 10 mg tablet   No No  
Sig: Take 1 Tab by mouth every morning. meloxicam (MOBIC) 7.5 mg tablet   No No  
Sig: Take 1-2 Tabs by mouth daily as needed for Pain. methenamine hippurate (HIPREX) 1 gram tablet   No No  
Sig: Take 1 Tab by mouth two (2) times daily (with meals). nadolol (CORGARD) 20 mg tablet   No No  
Sig: Take 1 Tab by mouth nightly. nitroglycerin (NITROSTAT) 0.4 mg SL tablet   No No  
Sig: DISSOLVE 1 TABLET UNDER TONGUE EVERY 5 MINUTES AS NEEDED FOR CHEST PAIN  
oxybutynin chloride XL (DITROPAN XL) 10 mg CR tablet   Yes No  
Sig: Take 10 mg by mouth daily. pantoprazole (PROTONIX) 40 mg tablet   Yes No  
Sig: Take 40 mg by mouth as needed. sertraline (ZOLOFT) 50 mg tablet   No No  
Sig: Take 1/2 tab at bedtime  
triamterene-hydroCHLOROthiazide (DYAZIDE) 37.5-25 mg per capsule   Yes No  
Sig: Take  by mouth as needed. Objective:  
 
Visit Vitals /84 (BP 1 Location: Left arm, BP Patient Position: At rest;Head of bed elevated (Comment degrees)) Pulse 65 Temp 98.6 °F (37 °C) Resp 18 Ht 5' 8\" (1.727 m) Wt 77.1 kg (170 lb) SpO2 98% Breastfeeding? No  
BMI 25.85 kg/m² Temp (24hrs), Av.2 °F (36.8 °C), Min:97.4 °F (36.3 °C), Max:98.6 °F (37 °C) Oxygen Therapy O2 Sat (%): 98 % (19 1637) Pulse via Oximetry: 109 beats per minute (19 1039) O2 Device: Nasal cannula (19) O2 Flow Rate (L/min): 2 l/min (19) Physical Exam: 
General:    Alert, cooperative, no distress, appears stated age. Head:   Normocephalic, without obvious abnormality, atraumatic. Nose:  Nares normal. No drainage or sinus tenderness. Lungs:   Wheezing on the rt posteriorly and bl anteriorly Heart:   Regular rate and rhythm,  no murmur, rub or gallop. Abdomen:   Soft, non-tender. Not distended. Bowel sounds normal.  
Extremities: No cyanosis. No edema. No clubbing Skin:     Texture, turgor normal. No rashes or lesions. Not Jaundiced Neurologic: GCS 15, no motor or sensory deficits, CN 2-12 intact Psych:             AO x3, mood and affect appropriate Data Review:  
Recent Results (from the past 24 hour(s)) METABOLIC PANEL, BASIC Collection Time: 01/03/19  5:21 AM  
Result Value Ref Range Sodium 139 136 - 145 mmol/L Potassium 5.5 (H) 3.5 - 5.1 mmol/L Chloride 107 98 - 107 mmol/L  
 CO2 28 21 - 32 mmol/L Anion gap 4 mmol/L Glucose 97 65 - 100 mg/dL BUN 11 8 - 23 MG/DL Creatinine 0.38 (L) 0.6 - 1.0 MG/DL  
 GFR est AA >60 >60 ml/min/1.73m2 GFR est non-AA >60 ml/min/1.73m2 Calcium 8.6 8.3 - 10.4 MG/DL  
CBC W/O DIFF Collection Time: 01/03/19  5:21 AM  
Result Value Ref Range WBC 11.2 (H) 4.3 - 11.1 K/uL  
 RBC 3.61 (L) 4.05 - 5.2 M/uL  
 HGB 10.6 (L) 11.7 - 15.4 g/dL HCT 33.5 (L) 35.8 - 46.3 % MCV 92.8 79.6 - 97.8 FL  
 MCH 29.4 26.1 - 32.9 PG  
 MCHC 31.6 31.4 - 35.0 g/dL  
 RDW 14.2 11.9 - 14.6 % PLATELET 899 166 - 505 K/uL MPV 10.4 9.4 - 12.3 FL ABSOLUTE NRBC 0.00 0.0 - 0.2 K/uL Imaging Avinash Lemus All diagnostic imaging personally reviewed by me. CXR: 
Comparison: Chest radiograph July 25, 2013 
  
Findings:  Partially obscured cardiomediastinal silhouette. There is dense 
consolidation of most of the right middle lobe, consistent with acute right 
middle lobar pneumonia. No evidence of pneumothorax, pleural effusion. Visualized soft tissue and osseous structures otherwise unremarkable.   
  
IMPRESSION Impression:  Acute right middle lobar pneumonia. Assessment and Plan: Active Hospital Problems Diagnosis Date Noted  Gram-positive cocci bacteremia 01/02/2019  Hypokalemia 01/01/2019  Community acquired bacterial pneumonia 01/01/2019  [facility] 01/01/2019  Acute renal failure (ARF) [facility] 01/01/2019  Right middle lobe pneumonia ([facility] 01/01/2019 PLAN 
 · Continue current abx- de-escalate pending culture re-sults with regards to sensitivity · Nebs · pulmicort · ICS · Continue scheduled percocet. · Fatigue- Check mag · Decrease ivfls · Continue Mucinex and tessalon for cough · Continue percocet q6h scheduled · Prn zofran for nausea/vomiting · Prn tylenol for fever · DVT prophylaxis with lovenox 
· PT/OT in AM 
 
Code Status: full High risk with opioids on board Dispo: pending until medically stable Signed By: Mary Kate Callejas MD   
 January 3, 2019

## 2019-01-03 NOTE — PROGRESS NOTES
Given Percocet one tablet po for pain in abdomin and right leg from back surgery in the past, pain level 7.

## 2019-01-03 NOTE — PROGRESS NOTES
Shift assessment done. Received alert and oriented x4. Diminished breath sounds with even respiration and unlabored. Productive cough noted. Percocet scheduled every 6 hours. Abdomen soft with active bowel sounds. Denies needs this time. Bed low and locked. Call light within reach. Will continue to monitor.

## 2019-01-03 NOTE — PROGRESS NOTES
Sitting on side of bed, states \" SCD's off going to bathroom several times and I'm always  in a hurry\", no acute distress, lungs scattered expiratory wheezing and rhonchi A/P, nasal O2 2 liters, ankle edema +1, skin intact.

## 2019-01-03 NOTE — PROGRESS NOTES
Spiritual care initial visit made by [address] [place], [place] [address]all [facility]. Card left. EVELYN Avalos

## 2019-01-04 LAB
ANION GAP SERPL CALC-SCNC: 10 MMOL/L
BACTERIA SPEC CULT: ABNORMAL
BUN SERPL-MCNC: 7 MG/DL (ref 8–23)
CALCIUM SERPL-MCNC: 8.6 MG/DL (ref 8.3–10.4)
CHLORIDE SERPL-SCNC: 101 MMOL/L (ref 98–107)
CO2 SERPL-SCNC: 27 MMOL/L (ref 21–32)
CREAT SERPL-MCNC: 0.41 MG/DL (ref 0.6–1)
ERYTHROCYTE [DISTWIDTH] IN BLOOD BY AUTOMATED COUNT: 14 % (ref 11.9–14.6)
GLUCOSE SERPL-MCNC: 99 MG/DL (ref 65–100)
GRAM STN SPEC: ABNORMAL
HCT VFR BLD AUTO: 34 % (ref 35.8–46.3)
HGB BLD-MCNC: 11.1 G/DL (ref 11.7–15.4)
MCH RBC QN AUTO: 29.4 PG (ref 26.1–32.9)
MCHC RBC AUTO-ENTMCNC: 32.6 G/DL (ref 31.4–35)
MCV RBC AUTO: 90.2 FL (ref 79.6–97.8)
NRBC # BLD: 0 K/UL (ref 0–0.2)
PLATELET # BLD AUTO: 286 K/UL (ref 150–450)
PMV BLD AUTO: 10 FL (ref 9.4–12.3)
POTASSIUM SERPL-SCNC: 4.3 MMOL/L (ref 3.5–5.1)
RBC # BLD AUTO: 3.77 M/UL (ref 4.05–5.2)
SERVICE CMNT-IMP: ABNORMAL
SERVICE CMNT-IMP: ABNORMAL
SODIUM SERPL-SCNC: 138 MMOL/L (ref 136–145)
VANCOMYCIN TROUGH SERPL-MCNC: 3.5 UG/ML (ref 5–20)
WBC # BLD AUTO: 12.5 K/UL (ref 4.3–11.1)

## 2019-01-04 PROCEDURE — 74011000258 HC RX REV CODE- 258: Performed by: INTERNAL MEDICINE

## 2019-01-04 PROCEDURE — 74011250637 HC RX REV CODE- 250/637: Performed by: HOSPITALIST

## 2019-01-04 PROCEDURE — 80048 BASIC METABOLIC PNL TOTAL CA: CPT

## 2019-01-04 PROCEDURE — 36415 COLL VENOUS BLD VENIPUNCTURE: CPT

## 2019-01-04 PROCEDURE — 80202 ASSAY OF VANCOMYCIN: CPT

## 2019-01-04 PROCEDURE — 74011250636 HC RX REV CODE- 250/636: Performed by: HOSPITALIST

## 2019-01-04 PROCEDURE — 94640 AIRWAY INHALATION TREATMENT: CPT

## 2019-01-04 PROCEDURE — 74011000250 HC RX REV CODE- 250: Performed by: INTERNAL MEDICINE

## 2019-01-04 PROCEDURE — 74011250636 HC RX REV CODE- 250/636: Performed by: INTERNAL MEDICINE

## 2019-01-04 PROCEDURE — 94760 N-INVAS EAR/PLS OXIMETRY 1: CPT

## 2019-01-04 PROCEDURE — 65270000029 HC RM PRIVATE

## 2019-01-04 PROCEDURE — 85027 COMPLETE CBC AUTOMATED: CPT

## 2019-01-04 RX ORDER — VANCOMYCIN/0.9 % SOD CHLORIDE 1.5G/250ML
1500 PLASTIC BAG, INJECTION (ML) INTRAVENOUS EVERY 12 HOURS
Status: DISCONTINUED | OUTPATIENT
Start: 2019-01-04 | End: 2019-01-05 | Stop reason: HOSPADM

## 2019-01-04 RX ADMIN — BENZONATATE 100 MG: 100 CAPSULE ORAL at 16:34

## 2019-01-04 RX ADMIN — VANCOMYCIN HYDROCHLORIDE 1500 MG: 10 INJECTION, POWDER, LYOPHILIZED, FOR SOLUTION INTRAVENOUS at 11:15

## 2019-01-04 RX ADMIN — ENOXAPARIN SODIUM 40 MG: 40 INJECTION SUBCUTANEOUS at 16:33

## 2019-01-04 RX ADMIN — Medication 10 ML: at 23:02

## 2019-01-04 RX ADMIN — LISINOPRIL 10 MG: 5 TABLET ORAL at 07:47

## 2019-01-04 RX ADMIN — IPRATROPIUM BROMIDE AND ALBUTEROL SULFATE 3 ML: .5; 3 SOLUTION RESPIRATORY (INHALATION) at 07:50

## 2019-01-04 RX ADMIN — SERTRALINE HYDROCHLORIDE 25 MG: 50 TABLET ORAL at 18:02

## 2019-01-04 RX ADMIN — METHENAMINE HIPPURATE 1 G: 1000 TABLET ORAL at 17:00

## 2019-01-04 RX ADMIN — PANTOPRAZOLE SODIUM 40 MG: 40 TABLET, DELAYED RELEASE ORAL at 07:48

## 2019-01-04 RX ADMIN — BENZONATATE 100 MG: 100 CAPSULE ORAL at 07:48

## 2019-01-04 RX ADMIN — Medication 5 ML: at 05:50

## 2019-01-04 RX ADMIN — ISOSORBIDE MONONITRATE 30 MG: 30 TABLET, EXTENDED RELEASE ORAL at 07:48

## 2019-01-04 RX ADMIN — OXYCODONE AND ACETAMINOPHEN 1 TABLET: 5; 325 TABLET ORAL at 03:40

## 2019-01-04 RX ADMIN — METHENAMINE HIPPURATE 1 G: 1000 TABLET ORAL at 08:00

## 2019-01-04 RX ADMIN — IPRATROPIUM BROMIDE AND ALBUTEROL SULFATE 3 ML: .5; 3 SOLUTION RESPIRATORY (INHALATION) at 01:19

## 2019-01-04 RX ADMIN — OXYCODONE AND ACETAMINOPHEN 1 TABLET: 5; 325 TABLET ORAL at 23:01

## 2019-01-04 RX ADMIN — Medication 10 ML: at 16:34

## 2019-01-04 RX ADMIN — IPRATROPIUM BROMIDE AND ALBUTEROL SULFATE 3 ML: .5; 3 SOLUTION RESPIRATORY (INHALATION) at 13:21

## 2019-01-04 RX ADMIN — OXYCODONE AND ACETAMINOPHEN 1 TABLET: 5; 325 TABLET ORAL at 10:18

## 2019-01-04 RX ADMIN — BENZONATATE 100 MG: 100 CAPSULE ORAL at 23:00

## 2019-01-04 RX ADMIN — OXYCODONE AND ACETAMINOPHEN 1 TABLET: 5; 325 TABLET ORAL at 16:34

## 2019-01-04 RX ADMIN — SODIUM CHLORIDE 1 G: 900 INJECTION, SOLUTION INTRAVENOUS at 05:50

## 2019-01-04 RX ADMIN — IPRATROPIUM BROMIDE AND ALBUTEROL SULFATE 3 ML: .5; 3 SOLUTION RESPIRATORY (INHALATION) at 19:37

## 2019-01-04 RX ADMIN — GUAIFENESIN 600 MG: 600 TABLET, EXTENDED RELEASE ORAL at 07:48

## 2019-01-04 RX ADMIN — OXYBUTYNIN CHLORIDE 10 MG: 5 TABLET, EXTENDED RELEASE ORAL at 07:47

## 2019-01-04 RX ADMIN — BUDESONIDE 500 MCG: 0.5 INHALANT RESPIRATORY (INHALATION) at 07:50

## 2019-01-04 RX ADMIN — METOPROLOL SUCCINATE 25 MG: 25 TABLET, EXTENDED RELEASE ORAL at 07:48

## 2019-01-04 RX ADMIN — ASPIRIN 81 MG 81 MG: 81 TABLET ORAL at 07:48

## 2019-01-04 RX ADMIN — GUAIFENESIN 600 MG: 600 TABLET, EXTENDED RELEASE ORAL at 23:01

## 2019-01-04 RX ADMIN — BUDESONIDE 500 MCG: 0.5 INHALANT RESPIRATORY (INHALATION) at 19:37

## 2019-01-04 RX ADMIN — SODIUM CHLORIDE 2 G: 900 INJECTION, SOLUTION INTRAVENOUS at 16:34

## 2019-01-04 NOTE — PROGRESS NOTES
"A.M assessment complete; pt in bed. Alert & oriented. Resp even & unlabored on room air; lungs clear; pt does have a dry hacking, painful cough. HR regular. Abdomen soft with active bowel sounds. Skin intact. No c/o SOB. Bed low & locked; call light in reach; will continue to monitor.    ligament repair  Shireen Mcclendon MD  08/22/17 [id number]

## 2019-01-04 NOTE — PROGRESS NOTES
Shift assessment done. Received alert and oriented. Respirations even and unlabored. O2 at 2L via NC. Productive cough noted. Abdomen soft with active bowel sounds. Denies needs this time. Bed low and locked. Call light within reach. Will continue to monitor.

## 2019-01-04 NOTE — PROGRESS NOTES
Vancomycin Consult MD ordering: Ольга BARRIGA following? no Indication: Bloodstream infection DOT:  ?? days Goal level(s): 15 - 20 Ht Readings from Last 1 Encounters:  
19 5' 8\" (1.727 m) Wt Readings from Last 1 Encounters:  
19 77.1 kg (170 lb) Temp (24hrs), Av.6 °F (37 °C), Min:98 °F (36.7 °C), Max:99.6 °F (37.6 °C) Dosing weight: 77 kg 
77 y.o. Recent Labs 19 
0532 19 
1026 19 
1021 BUN 18  --  39* CREA 0.50*  --  1.06* WBC 21.3*  --  26.7*  
PCT  --   --  6.9 LACPOC  --  1.36  --   
 
Estimated Creatinine Clearance: 99.7 mL/min (A) (based on SCr of 0.5 mg/dL (L)). Corrected CrCl ~ 50 ml/min Vancomycin consult day 2 Date:  Dose/Freq Admin Times Level/Time:  
1/ 1 gm IV x 2 doses (2 gm total loading dose) 0455, 0615    
1/ 1500 mg IV q18h 2253 1/3 1500mg q18h (17) - not documented as administered  Changed to 1500 mg IV every 12 hours (1100)  (23) Tr was 3.5 at 1015 Day 3 Assessment and Plan: 
Vancomycin dose increased to 1500 mg IV every 12 hours. Pharmacy will continue to follow. Thank you, Anita Carbajal PharmD, BCPS

## 2019-01-04 NOTE — PROGRESS NOTES
Saw pt in interdisciplinarily rounds, plan of care and discharge date/ location discussed. Per the hospitalitis plan are to d/c home tomorrow with [facility] services.

## 2019-01-05 VITALS
TEMPERATURE: 98.3 F | HEIGHT: 68 IN | OXYGEN SATURATION: 91 % | DIASTOLIC BLOOD PRESSURE: 90 MMHG | WEIGHT: 170 LBS | RESPIRATION RATE: 18 BRPM | BODY MASS INDEX: 25.76 KG/M2 | SYSTOLIC BLOOD PRESSURE: 178 MMHG | HEART RATE: 72 BPM

## 2019-01-05 PROCEDURE — 74011250637 HC RX REV CODE- 250/637: Performed by: HOSPITALIST

## 2019-01-05 PROCEDURE — 94640 AIRWAY INHALATION TREATMENT: CPT

## 2019-01-05 PROCEDURE — 94760 N-INVAS EAR/PLS OXIMETRY 1: CPT

## 2019-01-05 PROCEDURE — 74011250636 HC RX REV CODE- 250/636: Performed by: HOSPITALIST

## 2019-01-05 PROCEDURE — 74011000250 HC RX REV CODE- 250: Performed by: INTERNAL MEDICINE

## 2019-01-05 PROCEDURE — 74011000258 HC RX REV CODE- 258: Performed by: INTERNAL MEDICINE

## 2019-01-05 PROCEDURE — 74011250636 HC RX REV CODE- 250/636: Performed by: INTERNAL MEDICINE

## 2019-01-05 RX ORDER — SAME BUTANEDISULFONATE/BETAINE 400-600 MG
500 POWDER IN PACKET (EA) ORAL 2 TIMES DAILY
Qty: 28 CAP | Refills: 0 | Status: SHIPPED | OUTPATIENT
Start: 2019-01-05 | End: 2019-01-12

## 2019-01-05 RX ORDER — CLINDAMYCIN HYDROCHLORIDE 300 MG/1
300 CAPSULE ORAL 4 TIMES DAILY
Qty: 28 CAP | Refills: 0 | Status: SHIPPED | OUTPATIENT
Start: 2019-01-05 | End: 2019-01-12

## 2019-01-05 RX ORDER — ALBUTEROL SULFATE 90 UG/1
AEROSOL, METERED RESPIRATORY (INHALATION)
Qty: 1 INHALER | Refills: 0 | Status: SHIPPED | OUTPATIENT
Start: 2019-01-05 | End: 2019-01-11

## 2019-01-05 RX ORDER — CLINDAMYCIN HYDROCHLORIDE 300 MG/1
300 CAPSULE ORAL 3 TIMES DAILY
Qty: 21 CAP | Refills: 0 | Status: SHIPPED | OUTPATIENT
Start: 2019-01-05 | End: 2019-01-05 | Stop reason: SDUPTHER

## 2019-01-05 RX ORDER — OXYCODONE AND ACETAMINOPHEN 5; 325 MG/1; MG/1
1 TABLET ORAL
Qty: 21 TAB | Refills: 0 | Status: SHIPPED | OUTPATIENT
Start: 2019-01-05 | End: 2019-01-11 | Stop reason: ALTCHOICE

## 2019-01-05 RX ORDER — GUAIFENESIN 600 MG/1
600 TABLET, EXTENDED RELEASE ORAL EVERY 12 HOURS
Qty: 14 TAB | Refills: 0 | Status: SHIPPED | OUTPATIENT
Start: 2019-01-05 | End: 2019-01-08

## 2019-01-05 RX ORDER — OXYCODONE AND ACETAMINOPHEN 5; 325 MG/1; MG/1
1 TABLET ORAL
Qty: 12 TAB | Refills: 0 | Status: SHIPPED | OUTPATIENT
Start: 2019-01-05 | End: 2019-01-05

## 2019-01-05 RX ADMIN — OXYBUTYNIN CHLORIDE 10 MG: 5 TABLET, EXTENDED RELEASE ORAL at 09:18

## 2019-01-05 RX ADMIN — BUDESONIDE 500 MCG: 0.5 INHALANT RESPIRATORY (INHALATION) at 07:59

## 2019-01-05 RX ADMIN — IPRATROPIUM BROMIDE AND ALBUTEROL SULFATE 3 ML: .5; 3 SOLUTION RESPIRATORY (INHALATION) at 07:59

## 2019-01-05 RX ADMIN — BENZONATATE 100 MG: 100 CAPSULE ORAL at 08:12

## 2019-01-05 RX ADMIN — OXYCODONE AND ACETAMINOPHEN 1 TABLET: 5; 325 TABLET ORAL at 04:38

## 2019-01-05 RX ADMIN — ISOSORBIDE MONONITRATE 30 MG: 30 TABLET, EXTENDED RELEASE ORAL at 09:18

## 2019-01-05 RX ADMIN — METHENAMINE HIPPURATE 1 G: 1000 TABLET ORAL at 08:00

## 2019-01-05 RX ADMIN — SODIUM CHLORIDE 2 G: 900 INJECTION, SOLUTION INTRAVENOUS at 08:12

## 2019-01-05 RX ADMIN — ASPIRIN 81 MG 81 MG: 81 TABLET ORAL at 08:12

## 2019-01-05 RX ADMIN — GUAIFENESIN 600 MG: 600 TABLET, EXTENDED RELEASE ORAL at 09:18

## 2019-01-05 RX ADMIN — METOPROLOL SUCCINATE 25 MG: 25 TABLET, EXTENDED RELEASE ORAL at 09:18

## 2019-01-05 RX ADMIN — VANCOMYCIN HYDROCHLORIDE 1500 MG: 10 INJECTION, POWDER, LYOPHILIZED, FOR SOLUTION INTRAVENOUS at 02:43

## 2019-01-05 RX ADMIN — PANTOPRAZOLE SODIUM 40 MG: 40 TABLET, DELAYED RELEASE ORAL at 08:12

## 2019-01-05 RX ADMIN — LISINOPRIL 10 MG: 5 TABLET ORAL at 09:18

## 2019-01-05 RX ADMIN — OXYCODONE AND ACETAMINOPHEN 1 TABLET: 5; 325 TABLET ORAL at 09:54

## 2019-01-05 NOTE — DISCHARGE INSTRUCTIONS
Activity: as tolerated  Diet: DIET CARDIAC Regular   Take antibiotics with food; maalox or mylanta over the counter as needed for any stomach upset  Follow up with PCP in 1 week. Should have a repeat cxr - 2- 4 weeks to ensure resolution of your pneumonia. Take your protonix everyday while on the antibiotic.

## 2019-01-05 NOTE — PROGRESS NOTES
Discharge instructions reviewed with patient. Opportunity for questions given. Pt instructed to call on Monday for follow up appt, pt voiced understanding. Rx given. Pt able to leave once ride arrives.

## 2019-01-05 NOTE — PROGRESS NOTES
Vancomycin Consult MD ordering: Bettieil Decree ID following? no Indication: Bloodstream infection DOT:  ?? days Goal level(s): 15 - 20 Ht Readings from Last 1 Encounters:  
19 5' 8\" (1.727 m) Wt Readings from Last 1 Encounters:  
19 77.1 kg (170 lb) Temp (24hrs), Av.6 °F (37 °C), Min:98 °F (36.7 °C), Max:99.6 °F (37.6 °C) Dosing weight: 77 kg 
77 y.o. Recent Labs 19 
0532 19 
1026 19 
1021 BUN 18  --  39* CREA 0.50*  --  1.06* WBC 21.3*  --  26.7*  
PCT  --   --  6.9 LACPOC  --  1.36  --   
 
Estimated Creatinine Clearance: 99.7 mL/min (A) (based on SCr of 0.5 mg/dL (L)). Corrected CrCl ~ 50 ml/min Vancomycin consult day 2 Date:  Dose/Freq Admin Times Level/Time:  
1/ 1 gm IV x 2 doses (2 gm total loading dose) 0455, 0615    
1/2 1500 mg IV q18h 2253 1/3 1500mg q18h (17) - not documented as administered  Changed to 1500 mg IV every 12 hours 1115  (23) Tr was 3.5 at 1015  
1/5 1500 mg IV every 12 hours 0243  (13)   
1/6 1500 mg IV every 12 hours ()  (13) TR 12 pm?  
     
         
         
  
Day 4 Assessment and Plan: 
Vancomycin continued at 1500 mg IV every 12 hours. Pharmacy will continue to follow. Thank you, Khadar MartinezD, BCPS

## 2019-01-05 NOTE — PROGRESS NOTES
A.M assessment complete: pt in bed. Alert & oriented. Resp even & unlabored on room air; lungs clear; pt has a dry, hacking cough. HR regular. Abd soft with active bowel sounds. Pt states she had a BM this a.m. No needs voiced; will continue to monitor.

## 2019-01-05 NOTE — DISCHARGE SUMMARY
Hospitalist Discharge Summary     Admit Date:  2019 10:05 AM   Name:  Omar Chowdhury   Age:  77 y.o.  :  1940   MRN:  [id number]   PCP:  Tien Hogan MD  Treatment Team: Attending Provider: Teddy Vazquez MD; Utilization Review: Mary Bentley RN    Problem List for this Hospitalization:  Hospital Problems as of 2019 Date Reviewed: 2018          Codes Class Noted - Resolved POA    Gram-positive cocci bacteremia ICD-10-CM: R78.81  ICD-9-CM: 790.7, 041.89  2019 - Present Unknown        Hypokalemia ICD-10-CM: E87.6  ICD-9-CM: 276.8  2019 - Present Unknown        Community acquired bacterial pneumonia ICD-10-CM: J15.9  ICD-9-CM: 482.9  2019 - Present Unknown        Sepsis ([facility] ICD-10-CM: A41.9  ICD-9-CM: 038.9, 995.91  2019 - Present Unknown        Acute renal failure (ARF) ([facility] ICD-10-CM: N17.9  ICD-9-CM: 584.9  2019 - Present Unknown        Right middle lobe pneumonia [facility]) ICD-10-CM: J18.1  ICD-9-CM: 100  2019 - Present Unknown                Admission HPI from 2019:    \"Patient is a 77years old female with pmhx of CAD, arthritis, GERD, dyslipidemia, HTN presented with 3 days hx of cough, congestion and right sided chest pain. Pt was in her usual health until 3 days ago, she developed sudden onset of cough with was associated with congestion and sputum production. She also noticed right sided chest pain which is worse with deep breathing and coughing. She described it as sharp pain, 6-8/10 in severity, piercing. She endorsed fever 101 (at home), 1 episode of diarrhea, generalized weakness/aches and poor oral intake. She denies sore throat, rhinorrhea, nausea/vomiting, abdominal pain, headache, lightheadedness/dizziness. In ER, CXR showed right ML PNA. WBC 26K, K 3.2, Cr 1.06, EKG NSR         \"    Hospital Course: The patient was admitted she was started on antibiotics.  Because of the severity of the rt sided chest pain with cough she was started on standing percocet. Hospital course also complicated by bronchospasms which improved with nebulizations. BC were positive for Strep pneumonia. Repeat cultures were negative. She was stable for discharge to follow with pcp. Follow up instructions below. Plan was discussed with Pt and IDT. All questions answered. Patient was stable at time of discharge and was instructed to call or return if there are any concerns or recurrence of symptoms. Diagnostic Imaging/Tests:   Xr Chest Pa Lat    Result Date: 2019  PA and lateral chest radiographs History: right sided chest pain cough fever, 78 years Female Comparison: Chest radiograph 2013 Findings:  Partially obscured cardiomediastinal silhouette. There is dense consolidation of most of the right middle lobe, consistent with acute right middle lobar pneumonia. No evidence of pneumothorax, pleural effusion. Visualized soft tissue and osseous structures otherwise unremarkable. Impression:  Acute right middle lobar pneumonia.         Echocardiogram results:  Results for orders placed or performed during the hospital encounter of 19   2D ECHO COMPLETE ADULT (TTE) [address]  [address] [address] [address] [facility]  [phone]    Transthoracic Echocardiogram  2D, M-mode, Doppler, and Color Doppler    Patient: Lars Durand  MR #: [id number]  : 1940  Age: 77 years  Gender: Female  Study date: 2019  Account #: [de-identified]  Height: 68 in  Weight: 169.6 lb  BSA: 1.91 mï¾²  Status:Routine  Location: [facility]  BP: 117/ 63    Allergies: ADHESIVE, AMOXICILLIN, IODINE, IODINE AND IODIDE CONTAINING  PRODUCTS, PENICILLIN, SHELLFISH CONTAINING PRODUCTS, SHELLFISH DERIVED,  ADHESIVE TAPE-SILICONES, SULFAMETHOPRIM SS, CIPROFLOXACIN, DOCOSAHEXANOIC  ACID-EPA, NSAIDS (NON-STEROIDAL ANTI-INFLAMMATORY DRUG), OMEGA-3 FATTY ACIDS,  STATINS-HMG-COA REDUCTASE INHIBITORS, SULFAMETHOXAZOLE-TRIMETHOPRIM, TREE   NUT,  VIT E-NONOXYNOL 9-ALOE VERA    Sonographer:  Sharon Villafana [facility]  Group:  [id number] Cardiology  Referring Physician:  Abundio Hoyos MD  Reading Physician:  Darling Argueta. [address] Rosado MD [facility] [place], [place]    INDICATIONS: Cocci in Blood    PROCEDURE: This was a routine study. A transthoracic echocardiogram was  performed. The study included complete 2D imaging, M-mode, complete spectral  Doppler, and color Doppler. Image quality was adequate. LEFT VENTRICLE: Size was normal. Systolic function was normal. Ejection  fraction was estimated in the range of 55 % to 60 %. There were no regional  wall motion abnormalities. Wall thickness was mildly increased. Left  ventricular diastolic function was normal. Average E/e' of 9.3. RIGHT VENTRICLE: The size was at the upper limits of normal. Systolic   function  was normal.    LEFT ATRIUM: The atrium was mildly dilated. RIGHT ATRIUM: Size was normal.    SYSTEMIC VEINS: IVC: The inferior vena cava was dilated. The respirophasic  change in diameter was less than 50%. AORTIC VALVE: The valve was structurally normal, tri-commissural. There was   no  evidence for stenosis. There was no insufficiency. MITRAL VALVE: Valve structure was normal. There was no evidence for stenosis. There was no regurgitation. TRICUSPID VALVE: The valve structure was normal. There was no evidence for  stenosis. There was trivial regurgitation. PULMONIC VALVE: The valve structure was normal. There was no evidence for  stenosis. There was no insufficiency. PERICARDIUM: There was no pericardial effusion. AORTA: The root exhibited normal size. SUMMARY:    -  Left ventricle: Systolic function was normal. Ejection fraction was  estimated in the range of 55 % to 60 %. There were no regional wall motion  abnormalities. Wall thickness was mildly increased. -  Right ventricle:  The size was at the upper limits of normal.    -  Left atrium: The atrium was mildly dilated. -  Inferior vena cava, hepatic veins: The inferior vena cava was dilated. The  respirophasic change in diameter was less than 50%. SYSTEM MEASUREMENT TABLES    2D mode  AoR Diam (2D): 2.8 cm  LA Dimension (2D): 4.6 cm  Left Atrium Systolic Volume Index; Method of Disks, Biplane; 2D mode;: 37.7  ml/m2  IVS/LVPW (2D): 1.1  IVSd (2D): 1.3 cm  LVIDd (2D): 4.8 cm  LVIDs (2D): 3.2 cm  LVPWd (2D): 1.2 cm  RVIDd (2D): 3.4 cm    Unspecified Scan Mode  Peak Grad; Mean; Antegrade Flow: 10 mm[Hg]  Vmax; Antegrade Flow: 155 cm/s    Prepared and signed by    Ajay Rodriguez  [address] MD [facility] [place]  Signed 02-Jan-2019 15:30:08           All Micro Results     Procedure Component Value Units Date/Time    CULTURE, BLOOD [[id number]] Collected:  01/02/19 0532    Order Status:  Completed Specimen:  Blood Updated:  01/05/19 0755     Special Requests: LEFT ANTECUBITAL        Culture result: NO GROWTH 3 DAYS       CULTURE, BLOOD [[id number]] Collected:  01/02/19 0538    Order Status:  Completed Specimen:  Blood Updated:  01/05/19 0755     Special Requests: RIGHT HAND        Culture result: NO GROWTH 3 DAYS       CULTURE, BLOOD [[id number]]  (Abnormal) Collected:  01/01/19 1337    Order Status:  Completed Specimen:  Whole Blood Updated:  01/04/19 115     Special Requests: --        RIGHT  FOREARM       GRAM STAIN GRAM POSITIVE COCCI               AEROBIC AND ANAEROBIC BOTTLES                  RESULTS VERIFIED, PHONED TO AND READ BACK BY TANVIR MORLEY RN AT 8121 01/02/2019 BY CTAYLOR           Culture result:       STREPTOCOCCUS PNEUMONIAE Reported to Dept of Health            REFER TO [address] NO [id number] FOR ID AND SUSCEPTIBILITY    CULTURE, BLOOD [[id number]]  (Abnormal)  (Susceptibility) Collected:  01/01/19 1318    Order Status:  Completed Specimen:  Whole Blood Updated:  01/04/19 0727     Special Requests: --        LEFT  Antecubital       GRAM STAIN GRAM POSITIVE COCCI               AEROBIC AND ANAEROBIC BOTTLES RESULTS VERIFIED, PHONED TO AND READ BACK BY Jocelyn Bernabe RN AT 5568 01/02/2019 BY CTAYLOR           Culture result:       STREPTOCOCCUS PNEUMONIAE Reported to Dept of Health          SJefry Campos Mcgowan, UR/CSF [[id number]] Collected:  01/01/19 1600    Order Status:  Canceled Specimen:  Other     INFLUENZA A & B AG (RAPID TEST) [[id number]] Collected:  01/01/19 1545    Order Status:  Canceled Specimen:  Nasopharyngeal     L. [place], [place] [[id number]] Collected:  01/01/19 1545    Order Status:  Canceled Specimen:  Urine     CULTURE, RESPIRATORY/SPUTUM/BRONCH Blinda Savant STAIN [[id number]] Collected:  01/01/19 1545    Order Status:  Canceled Specimen:  Sputum           Labs: Results:       BMP, Mg, Phos Recent Labs     01/04/19  0558 01/03/19  0521    139   K 4.3 5.5*    107   CO2 27 28   AGAP 10 4   BUN 7* 11   CREA 0.41* 0.38*   CA 8.6 8.6   GLU 99 97   MG  --  2.2      CBC Recent Labs     01/04/19  0558 01/03/19  0521   WBC 12.5* 11.2*   RBC 3.77* 3.61*   HGB 11.1* 10.6*   HCT 34.0* 33.5*    231      LFT No results for input(s): SGOT, ALT, TBIL, AP, TP, ALB, GLOB, AGRAT, GPT in the last 72 hours.    Cardiac Testing Lab Results   Component Value Date/Time     05/23/2017 08:56 AM    CK 39 05/23/2017 08:56 AM    Troponin-I, Qt. <0.02 (L) 07/26/2013 04:18 AM    Troponin-I, Qt. <0.02 (L) 07/25/2013 08:50 PM    Troponin-I, Qt. <0.02 (L) 07/25/2013 10:30 AM      Coagulation Tests Lab Results   Component Value Date/Time    Prothrombin time 10.8 07/25/2013 10:30 AM    INR 1.0 07/25/2013 10:30 AM    aPTT 30.3 07/25/2013 08:50 PM    aPTT 27.7 07/25/2013 10:30 AM      A1c No results found for: HBA1C, HGBE8, VCP1BTCD   Lipid Panel Lab Results   Component Value Date/Time    Cholesterol, total 159 12/19/2018 11:16 AM    HDL Cholesterol 61 (H) 12/19/2018 11:16 AM    LDL,Direct 121 (H) 05/14/2009 09:09 AM    LDL, calculated 79.8 12/19/2018 11:16 AM    VLDL, calculated 18.2 12/19/2018 11:16 AM    Triglyceride 91 12/19/2018 11:16 AM    CHOL/HDL Ratio 2.6 12/19/2018 11:16 AM      Thyroid Panel Lab Results   Component Value Date/Time    TSH 1.030 12/04/2018 11:20 AM    TSH 1.160 05/23/2017 08:56 AM        Most Recent UA No results found for: COLOR, APPRN, REFSG, AUDREY, PROTU, GLUCU, KETU, BILU, BLDU, UROU, FELIX, LEUKU     Allergies   Allergen Reactions    Adhesive Hives    Amoxicillin Swelling and Anaphylaxis     Other reaction(s): Lips/Mouth swelling-A    Iodine Rash     Other reaction(s): Hives/Swelling-A    Iodine And Iodide Containing Products Rash    Penicillin Rash    Shellfish Containing Products Hives    Shellfish Derived Anaphylaxis    Adhesive Tape-Silicones Other (comments) and Rash     Blisters; PAPER TAPE OK  blisters    Bactrim [Sulfamethoprim Ss] Unknown (comments)     Leg cramps    Ciprofloxacin Nausea Only, Myalgia and Other (comments)     Other reaction(s): Joint soreness-I    Fish Oil [Docosahexanoic Acid-Epa] Rash    Nsaids (Non-Steroidal Anti-Inflammatory Drug) Other (comments)     Bowel obstruction    Omega 3 [Omega-3 Fatty Acids] Hives    Statins-Hmg-Coa Reductase Inhibitors Other (comments)     Leg cramps    Sulfamethoxazole-Trimethoprim Myalgia    Tree Nut Hives    Vit E-Nonoxynol 9-Aloe Vera Hives       There is no immunization history on file for this patient. All Labs from Last 24 Hrs:  No results found for this or any previous visit (from the past 24 hour(s)).     Discharge Exam:  Patient Vitals for the past 24 hrs:   Temp Pulse Resp BP SpO2   01/05/19 0833 98.3 °F (36.8 °C) 72 18 178/90 91 %   01/05/19 0759     91 %   01/05/19 0357 98.6 °F (37 °C) 65 18 175/62 92 %   01/04/19 2320 98.6 °F (37 °C) 81 16 (!) 177/95 90 %   01/04/19 1937     93 %   01/04/19 1933 99.3 °F (37.4 °C) 75 18 160/73 94 %   01/04/19 1600 97.8 °F (36.6 °C) 71 18 197/80 94 %   01/04/19 1321     92 %   01/04/19 1213 98.3 °F (36.8 °C) 65 16 161/74 91 %     Oxygen Therapy  O2 Sat (%): 91 % (01/05/19 8441)  Pulse via Oximetry: 67 beats per minute (01/05/19 0759)  O2 Device: Room air (01/05/19 0759)  O2 Flow Rate (L/min): 0 l/min (01/05/19 0759)  FIO2 (%): 21 % (01/05/19 0759)    Intake/Output Summary (Last 24 hours) at 1/5/2019 1049  Last data filed at 1/5/2019 0954  Gross per 24 hour   Intake 600 ml   Output 2150 ml   Net -1550 ml           Discharge Info:   Current Discharge Medication List      START taking these medications    Details   guaiFENesin ER (MUCINEX) 600 mg ER tablet Take 1 Tab by mouth every twelve (12) hours. Qty: 14 Tab, Refills: 0      Saccharomyces boulardii (FLORASTOR) 250 mg capsule Take 2 Caps by mouth two (2) times a day for 7 days. Qty: 28 Cap, Refills: 0      albuterol (PROVENTIL HFA, VENTOLIN HFA, PROAIR HFA) 90 mcg/actuation inhaler 2 puffs q4h x 2 days then q4h prn wheezing or sob. Qty: 1 Inhaler, Refills: 0      clindamycin (CLEOCIN) 300 mg capsule Take 1 Cap by mouth four (4) times daily for 7 days. Qty: 28 Cap, Refills: 0      oxyCODONE-acetaminophen (PERCOCET) 5-325 mg per tablet Take 1 Tab by mouth every eight (8) hours as needed for Pain. Max Daily Amount: 3 Tabs. Qty: 21 Tab, Refills: 0    Associated Diagnoses: Chest pain, unspecified type         CONTINUE these medications which have NOT CHANGED    Details   metoprolol succinate (TOPROL-XL) 25 mg XL tablet Take 25 mg by mouth daily. bimatoprost (LUMIGAN) 0.01 % ophthalmic drops Administer 1 Drop to both eyes every evening. oxybutynin chloride XL (DITROPAN XL) 10 mg CR tablet Take 10 mg by mouth daily. pantoprazole (PROTONIX) 40 mg tablet Take 40 mg by mouth as needed. dicyclomine (BENTYL) 10 mg capsule Take 10 mg by mouth as needed. triamterene-hydroCHLOROthiazide (DYAZIDE) 37.5-25 mg per capsule Take  by mouth as needed. lisinopril (PRINIVIL, ZESTRIL) 10 mg tablet Take 1 Tab by mouth every morning.   Qty: 90 Tab, Refills: 3      isosorbide mononitrate ER (IMDUR) 30 mg tablet Take 1 Tab by mouth daily. Qty: 90 Tab, Refills: 3      meloxicam (MOBIC) 7.5 mg tablet Take 1-2 Tabs by mouth daily as needed for Pain. Qty: 30 Tab, Refills: 3    Associated Diagnoses: Arthralgia, unspecified joint      methenamine hippurate (HIPREX) 1 gram tablet Take 1 Tab by mouth two (2) times daily (with meals). Qty: 180 Tab, Refills: 3      sertraline (ZOLOFT) 50 mg tablet Take 1/2 tab at bedtime  Qty: 60 Tab, Refills: 5    Comments: CANCEL ORDER FOR THE ZOLOFT 25MG      hydrOXYzine HCl (ATARAX) 10 mg tablet prn      nitroglycerin (NITROSTAT) 0.4 mg SL tablet DISSOLVE 1 TABLET UNDER TONGUE EVERY 5 MINUTES AS NEEDED FOR CHEST PAIN  Qty: 25 Tab, Refills: 3    Comments: **Patient requests 90 days supply**      cranberry 400 mg cap Take 1 Cap by mouth three (3) times daily. Qty: 100 Cap, Refills: prn      acetaminophen (TYLENOL ARTHRITIS PAIN) 650 mg CR tablet Take 650 mg by mouth every six (6) hours as needed for Pain. aspirin 81 mg chewable tablet Take 81 mg by mouth every morning. Indications: continue/ take on the dos               Disposition: home  Activity: as tolerated  Diet: DIET CARDIAC Regular   Take antibiotics with food; maalox or mylanta over the counter as needed for any stomach upset  Follow up with PCP in 1 week. Should have a repeat cxr - 2- 4 weeks to ensure resolution of your pneumonia. Take your protonix everyday while on the antibiotic. Follow-up Appointments   Procedures    FOLLOW UP VISIT Appointment in: One Week With PCP     With PCP     Standing Status:   Standing     Number of Occurrences:   1     Order Specific Question:   Appointment in     Answer: One Week         Follow-up Information     Follow up With Specialties Details Why Contact Info    Dereje Marcus MD Family Practice In 1 week repeat cxr in 2-4 weeks [address] [facility] [phone]            Time spent in patient discharge planning and coordination 35 minutes.     Signed:  Nasrin Gomez MD

## 2019-01-05 NOTE — PROGRESS NOTES
HOSPITALIST PROGRESS NOTENAME:  Maria Esther rAenas Solder Age:  77 y.o. 
:   1940 MRN:   [id number] PCP: Mikki Quiroz MD 
Consulting MD: Treatment Team: Attending Provider: Pilar Ellis MD; Utilization Review: Vicky Mcmanus RN As per Prior Documentation: 
\" 
SUBJECTIVE:  
Patient is a 77years old female with pmhx of CAD, arthritis, GERD, dyslipidemia, HTN admitted for sepsis due to RML community acquired pneumonia. Pt was initially started on IV Rocephin and Azithromycin. Blood cultures were positive for GPC, for which Abx switched to IV Cefepime and Vancomycin. In ER, CXR showed right ML PNA. WBC 26K, K 3.2, Cr 1.06, EKG NSR. 
 
19: 
Pt reports feeling little better. She still has 6-7/10 right sided chest pain on coughing and deep breathing. Gets better with percocet. Still has cough and sputum production, clearing. Discussed about blood culture results and change of antibiotics. She is making urin, passed 300 cc this AM 
No nausea/vomiting, diarrhea, fever. \" 
 
 
2018- Pt seen - doing ok. On oxygen which she put on because she was sob- taking standing pain meds and feels this is helping. Cultures pending. Coughing up more stuff.  
 
2019- pt seen less chest pain with breathing and coughing up more sputum 10 point ROS negative except what mentioned above. Prior to Admission Medications Prescriptions Last Dose Informant Patient Reported? Taking?  
acetaminophen (TYLENOL ARTHRITIS PAIN) 650 mg CR tablet   Yes No  
Sig: Take 650 mg by mouth every six (6) hours as needed for Pain. aspirin 81 mg chewable tablet   Yes No  
Sig: Take 81 mg by mouth every morning. Indications: continue/ take on the dos  
cranberry 400 mg cap   No No  
Sig: Take 1 Cap by mouth three (3) times daily. dicyclomine (BENTYL) 10 mg capsule   Yes No  
Sig: Take 10 mg by mouth as needed.   
hydrOXYzine HCl (ATARAX) 10 mg tablet   Yes No  
Sig: prn  
 isosorbide mononitrate ER (IMDUR) 30 mg tablet   No No  
Sig: Take 1 Tab by mouth daily. lisinopril (PRINIVIL, ZESTRIL) 10 mg tablet   No No  
Sig: Take 1 Tab by mouth every morning. meloxicam (MOBIC) 7.5 mg tablet   No No  
Sig: Take 1-2 Tabs by mouth daily as needed for Pain. methenamine hippurate (HIPREX) 1 gram tablet   No No  
Sig: Take 1 Tab by mouth two (2) times daily (with meals). nadolol (CORGARD) 20 mg tablet   No No  
Sig: Take 1 Tab by mouth nightly. nitroglycerin (NITROSTAT) 0.4 mg SL tablet   No No  
Sig: DISSOLVE 1 TABLET UNDER TONGUE EVERY 5 MINUTES AS NEEDED FOR CHEST PAIN  
oxybutynin chloride XL (DITROPAN XL) 10 mg CR tablet   Yes No  
Sig: Take 10 mg by mouth daily. pantoprazole (PROTONIX) 40 mg tablet   Yes No  
Sig: Take 40 mg by mouth as needed. sertraline (ZOLOFT) 50 mg tablet   No No  
Sig: Take 1/2 tab at bedtime  
triamterene-hydroCHLOROthiazide (DYAZIDE) 37.5-25 mg per capsule   Yes No  
Sig: Take  by mouth as needed. Objective:  
 
Visit Vitals /80 (BP 1 Location: Left arm, BP Patient Position: At rest;Sitting) Pulse 71 Temp 97.8 °F (36.6 °C) Resp 18 Ht 5' 8\" (1.727 m) Wt 77.1 kg (170 lb) SpO2 93% Breastfeeding? No  
BMI 25.85 kg/m² Temp (24hrs), Av.8 °F (37.1 °C), Min:97.8 °F (36.6 °C), Max:99.7 °F (37.6 °C) Oxygen Therapy O2 Sat (%): 93 % (19) Pulse via Oximetry: 69 beats per minute (19) O2 Device: Room air (19) O2 Flow Rate (L/min): 2 l/min (19) Physical Exam: 
General:    Alert, cooperative, no distress, appears stated age. Head:   Normocephalic, without obvious abnormality, atraumatic. Nose:  Nares normal. No drainage or sinus tenderness. Lungs:   Scattered Wheezing on the rt posteriorly and bl anteriorly Heart:   Regular rate and rhythm,  no murmur, rub or gallop. Abdomen:   Soft, non-tender. Not distended.   Bowel sounds normal.  
 Extremities: No cyanosis. No edema. No clubbing Skin:     Texture, turgor normal. No rashes or lesions. Not Jaundiced Neurologic: GCS 15, no motor or sensory deficits, CN 2-12 intact Psych:             AO x3, mood and affect appropriate Data Review:  
Recent Results (from the past 24 hour(s)) METABOLIC PANEL, BASIC Collection Time: 01/04/19  5:58 AM  
Result Value Ref Range Sodium 138 136 - 145 mmol/L Potassium 4.3 3.5 - 5.1 mmol/L Chloride 101 98 - 107 mmol/L  
 CO2 27 21 - 32 mmol/L Anion gap 10 mmol/L Glucose 99 65 - 100 mg/dL BUN 7 (L) 8 - 23 MG/DL Creatinine 0.41 (L) 0.6 - 1.0 MG/DL  
 GFR est AA >60 >60 ml/min/1.73m2 GFR est non-AA >60 ml/min/1.73m2 Calcium 8.6 8.3 - 10.4 MG/DL  
CBC W/O DIFF Collection Time: 01/04/19  5:58 AM  
Result Value Ref Range WBC 12.5 (H) 4.3 - 11.1 K/uL  
 RBC 3.77 (L) 4.05 - 5.2 M/uL  
 HGB 11.1 (L) 11.7 - 15.4 g/dL HCT 34.0 (L) 35.8 - 46.3 % MCV 90.2 79.6 - 97.8 FL  
 MCH 29.4 26.1 - 32.9 PG  
 MCHC 32.6 31.4 - 35.0 g/dL  
 RDW 14.0 11.9 - 14.6 % PLATELET 051 171 - 115 K/uL MPV 10.0 9.4 - 12.3 FL ABSOLUTE NRBC 0.00 0.0 - 0.2 K/uL [facility] Collection Time: 01/04/19 10:15 AM  
Result Value Ref Range Vancomycin,trough 3.5 (L) 5 - 20 ug/mL Imaging [facility] All diagnostic imaging personally reviewed by me. CXR: 
Comparison: Chest radiograph July 25, 2013 
  
Findings:  Partially obscured cardiomediastinal silhouette. There is dense 
consolidation of most of the right middle lobe, consistent with acute right 
middle lobar pneumonia. No evidence of pneumothorax, pleural effusion. Visualized soft tissue and osseous structures otherwise unremarkable.   
  
IMPRESSION Impression:  Acute right middle lobar pneumonia. Assessment and Plan: Active Hospital Problems Diagnosis Date Noted  Gram-positive cocci bacteremia 01/02/2019  Hypokalemia 01/01/2019  Community acquired bacterial pneumonia 01/01/2019  [facility]epsis ([facility] 01/01/2019  Acute renal failure (ARF) ([facility] 01/01/2019  [facility] 01/01/2019 PLAN 
· Strep pneumonia- de-escalate abx-  Based on culture re-sults · Continue Nebs · Continue pulmicort · ICS · Continue scheduled percocet. · Fatigue - supportive care · Dc ivfls · Continue Mucinex and tessalon for cough · Continue percocet q6h scheduled · Prn zofran for nausea/vomiting · Prn tylenol for fever · DVT prophylaxis with lovenox 
· PT/OT in AM 
 
Code Status: full High risk with opioids on board Dispo: pending until medically stable- likely discharge roman if she remains stable Signed By: Laura Kennedy MD   
 January 4, 2019

## 2019-01-05 NOTE — PROGRESS NOTES
Report received from [facility], [address] [place] [address]. PM assessment completed. PT is AAO x 4 with normal unlabored respirations present on RA. IV site is CDI and saline locked. Pt has no needs at this time. Bed is low and locked with call light in reach, will continue to monitor.

## 2019-01-06 ENCOUNTER — HOME CARE VISIT (OUTPATIENT)
Dept: SCHEDULING | Facility: HOME HEALTH | Age: 79
End: 2019-01-06
Payer: MEDICARE

## 2019-01-06 VITALS
OXYGEN SATURATION: 98 % | DIASTOLIC BLOOD PRESSURE: 88 MMHG | SYSTOLIC BLOOD PRESSURE: 168 MMHG | HEART RATE: 64 BPM | RESPIRATION RATE: 14 BRPM | TEMPERATURE: 99.3 F

## 2019-01-06 PROCEDURE — 400013 HH SOC

## 2019-01-06 PROCEDURE — 3331090002 HH PPS REVENUE DEBIT

## 2019-01-06 PROCEDURE — 3331090001 HH PPS REVENUE CREDIT

## 2019-01-06 PROCEDURE — G0299 HHS/HOSPICE OF RN EA 15 MIN: HCPCS

## 2019-01-07 ENCOUNTER — PATIENT OUTREACH (OUTPATIENT)
Dept: CASE MANAGEMENT | Age: 79
End: 2019-01-07

## 2019-01-07 ENCOUNTER — TELEPHONE (OUTPATIENT)
Dept: HOME HEALTH SERVICES | Facility: HOME HEALTH | Age: 79
End: 2019-01-07

## 2019-01-07 LAB
BACTERIA SPEC CULT: NORMAL
BACTERIA SPEC CULT: NORMAL
SERVICE CMNT-IMP: NORMAL
SERVICE CMNT-IMP: NORMAL

## 2019-01-07 PROCEDURE — 3331090002 HH PPS REVENUE DEBIT

## 2019-01-07 PROCEDURE — 3331090001 HH PPS REVENUE CREDIT

## 2019-01-07 NOTE — PROGRESS NOTES
This note will not be viewable in [address] 1st Attempt to contact patient for YUSRA call, no answer, no VM picked up. Will attempt to contact patient again within 24 hours

## 2019-01-07 NOTE — TELEPHONE ENCOUNTER
[address] [place] [address] Yomi Mckeon Pharmacist consult. Mrs. Perry was discharged Friday from [facility] after sepsis with PNA. [facility].  I reviewed her discharge medications. She is having no stomach issues with the Clindamycin and is taking the Florastor per orders. She tried the albuterol inhaler and had a 3 hour coughing spell. Her  told her to hold as she was having a reaction to it. She had some of this in the hospital also. I explained the use of the Mucinex as it is also causing severe coughing starting about an hour after a dose. It is BID. She took last night and coughed until about 5AM.  She has no chest congestion and is coughing up some. No coughing this morning as she did not take the morning dose. She is drinking a lot of ginger ale which is helping her stomach. She is going to hold the Mucinex this morning as she feels good except tired from coughing. She has long list of medication allergies, so I recommended she speak with her MD about these two drugs before taking again. I gave her my cell phone number and asked her to call with any medication questions.

## 2019-01-08 ENCOUNTER — HOME CARE VISIT (OUTPATIENT)
Dept: SCHEDULING | Facility: HOME HEALTH | Age: 79
End: 2019-01-08
Payer: MEDICARE

## 2019-01-08 ENCOUNTER — PATIENT OUTREACH (OUTPATIENT)
Dept: CASE MANAGEMENT | Age: 79
End: 2019-01-08

## 2019-01-08 VITALS
RESPIRATION RATE: 20 BRPM | TEMPERATURE: 97.4 F | SYSTOLIC BLOOD PRESSURE: 156 MMHG | DIASTOLIC BLOOD PRESSURE: 88 MMHG | HEART RATE: 63 BPM | OXYGEN SATURATION: 98 %

## 2019-01-08 PROCEDURE — G0299 HHS/HOSPICE OF RN EA 15 MIN: HCPCS

## 2019-01-08 PROCEDURE — 3331090001 HH PPS REVENUE CREDIT

## 2019-01-08 PROCEDURE — 3331090002 HH PPS REVENUE DEBIT

## 2019-01-08 NOTE — PROGRESS NOTES
This note will not be viewable in [address] Date/Time of Call: 
 01/08/19 1131am  
What was the patient hospitalized for? Hypokalemia Consent for JAKY HOOD Call Does the patient understand his/her diagnosis and/or treatment and what happened during the hospitalization? Patient agrees to call Yes Did the patient receive discharge instructions? Yes  
CM Assessed Risk for Readmission:  
 
 
Patient stated Risk for Readmission:  
 Patient is a low risk for readmission per Case Management assessment Patient states that she is not concerned about readmission at this time. Review any discharge instructions (see discharge instructions/AVS in [facility]). Ask patient if they understand these. Do they have any questions? DC instructions reviewed Were home services ordered (nursing, PT, OT, ST, etc.)? Yes If so, has the first visit occurred? If not, why? (Assist with coordination of services if necessary.) 
 01/06/19 Was any DME ordered? NO If so, has it been received? If not, why?  (Assist patient in obtaining DME orders &/or equipment if necessary. ) NA Complete a review of all medications (new, continued and discontinued meds per the D/C instructions and medication tab in [facility]). Medications reviewed with patient Were all new prescriptions filled? If not, why?  (Assist patient in obtaining medications if necessary  escalate for CCM &/or SW if ongoing issues are verbalized by pt or anticipated) Yespatient states that she DC Mucinex as her lips started swelling with it. Does the patient understand the purpose and dosing instructions for all medications? (If patient has questions, provide explanation and education.) Patient states that she understands all of her medications Does the patient have any problems in performing ADLs? (If patient is unable to perform ADLs  what is the limiting factor(s)?   Do they have a support system that can assist? If no support system is present, discuss possible assistance that they may be able to obtain. Escalate for CCM/SW if ongoing issues are verbalized by pt or anticipated) Patient is independent with ADLs Does the patient have all follow-up appointments scheduled? 7 day f/up with PCP?  
(f/up with PCP may be w/in 14 days if patient has a f/up with their specialist w/in 7 days) 7-14 day f/up with specialist?  
(or per discharge instructions) If f/up has not been made  what actions has the care coordinator made to accomplish this? Has transportation been arranged? Yes 
 
 
01/11/19 @ 815am 
 
 
 
NA Reviewed appointment information with patient Yes patient will transport herself to her appointment Any other questions or concerns expressed by the patient? Patient denies any questions or concerns at this time. Care Coordinator contact information provided should anything arise that he should need help with Schedule next appointment with JAKY Sierra or refer to RN Case Manager/ per the workflow guidelines. When is care coordinators next follow-up call scheduled? If referred for CCM  what RN care manager was the referral assigned? Within 30 days Within 30 days MAXIMILIANO   
YUSRA Call Completed By: Christian Davenport CMA Care Coordinator

## 2019-01-09 ENCOUNTER — HOSPITAL ENCOUNTER (OUTPATIENT)
Dept: GENERAL RADIOLOGY | Age: 79
Discharge: HOME | End: 2019-01-09
Payer: MEDICARE

## 2019-01-09 DIAGNOSIS — J18.9 PNEUMONIA OF RIGHT MIDDLE LOBE DUE TO INFECTIOUS ORGANISM: ICD-10-CM

## 2019-01-09 PROCEDURE — 71046 X-RAY EXAM CHEST 2 VIEWS: CPT

## 2019-01-09 PROCEDURE — 3331090002 HH PPS REVENUE DEBIT

## 2019-01-09 PROCEDURE — 3331090001 HH PPS REVENUE CREDIT

## 2019-01-10 PROCEDURE — 3331090002 HH PPS REVENUE DEBIT

## 2019-01-10 PROCEDURE — 3331090001 HH PPS REVENUE CREDIT

## 2019-01-11 PROCEDURE — 3331090001 HH PPS REVENUE CREDIT

## 2019-01-11 PROCEDURE — 3331090002 HH PPS REVENUE DEBIT

## 2019-01-12 PROCEDURE — 3331090001 HH PPS REVENUE CREDIT

## 2019-01-12 PROCEDURE — 3331090002 HH PPS REVENUE DEBIT

## 2019-01-13 PROCEDURE — 3331090002 HH PPS REVENUE DEBIT

## 2019-01-13 PROCEDURE — 3331090001 HH PPS REVENUE CREDIT

## 2019-01-14 ENCOUNTER — HOME CARE VISIT (OUTPATIENT)
Dept: SCHEDULING | Facility: HOME HEALTH | Age: 79
End: 2019-01-14
Payer: MEDICARE

## 2019-01-14 VITALS
SYSTOLIC BLOOD PRESSURE: 142 MMHG | OXYGEN SATURATION: 96 % | HEART RATE: 67 BPM | RESPIRATION RATE: 18 BRPM | TEMPERATURE: 97.9 F | DIASTOLIC BLOOD PRESSURE: 82 MMHG

## 2019-01-14 PROCEDURE — G0299 HHS/HOSPICE OF RN EA 15 MIN: HCPCS

## 2019-01-14 PROCEDURE — 3331090001 HH PPS REVENUE CREDIT

## 2019-01-14 PROCEDURE — 3331090002 HH PPS REVENUE DEBIT

## 2019-01-15 ENCOUNTER — TELEPHONE (OUTPATIENT)
Dept: HOME HEALTH SERVICES | Facility: HOME HEALTH | Age: 79
End: 2019-01-15

## 2019-01-15 PROCEDURE — 3331090002 HH PPS REVENUE DEBIT

## 2019-01-15 PROCEDURE — 3331090001 HH PPS REVENUE CREDIT

## 2019-01-15 NOTE — TELEPHONE ENCOUNTER
Mrs. Claude Sanford said she was doing much better. She had MD visit Friday. She has finished her antibiotics, but got a terrible case of mouth thrush and mouth sores. MD prescribed Magic mouthwash which she said she did not know what was in it, but it worked wonderfully. I told her the usual ingredients were Nystatin suspension, Maalox, Benadryl liquid and Lidocaine viscous. She said it just cooled her mouth and took the pain away. No other medication changes. No issues with her maintenance medications. I asked her to call with any medication questions or issues.

## 2019-01-16 PROCEDURE — 3331090001 HH PPS REVENUE CREDIT

## 2019-01-16 PROCEDURE — 3331090002 HH PPS REVENUE DEBIT

## 2019-01-17 ENCOUNTER — HOME CARE VISIT (OUTPATIENT)
Dept: SCHEDULING | Facility: HOME HEALTH | Age: 79
End: 2019-01-17
Payer: MEDICARE

## 2019-01-17 VITALS
DIASTOLIC BLOOD PRESSURE: 88 MMHG | HEART RATE: 89 BPM | SYSTOLIC BLOOD PRESSURE: 148 MMHG | OXYGEN SATURATION: 96 % | RESPIRATION RATE: 18 BRPM | TEMPERATURE: 97.2 F

## 2019-01-17 PROCEDURE — 3331090001 HH PPS REVENUE CREDIT

## 2019-01-17 PROCEDURE — 3331090002 HH PPS REVENUE DEBIT

## 2019-01-17 PROCEDURE — G0299 HHS/HOSPICE OF RN EA 15 MIN: HCPCS

## 2019-01-18 PROCEDURE — 3331090002 HH PPS REVENUE DEBIT

## 2019-01-18 PROCEDURE — 3331090001 HH PPS REVENUE CREDIT

## 2019-01-19 PROCEDURE — 3331090001 HH PPS REVENUE CREDIT

## 2019-01-19 PROCEDURE — 3331090002 HH PPS REVENUE DEBIT

## 2019-01-20 PROCEDURE — 3331090002 HH PPS REVENUE DEBIT

## 2019-01-20 PROCEDURE — 3331090001 HH PPS REVENUE CREDIT

## 2019-01-21 PROCEDURE — 3331090002 HH PPS REVENUE DEBIT

## 2019-01-21 PROCEDURE — 3331090001 HH PPS REVENUE CREDIT

## 2019-01-22 PROCEDURE — 3331090002 HH PPS REVENUE DEBIT

## 2019-01-22 PROCEDURE — 3331090001 HH PPS REVENUE CREDIT

## 2019-01-23 PROCEDURE — 3331090002 HH PPS REVENUE DEBIT

## 2019-01-23 PROCEDURE — 3331090001 HH PPS REVENUE CREDIT

## 2019-01-24 PROCEDURE — 3331090002 HH PPS REVENUE DEBIT

## 2019-01-24 PROCEDURE — 3331090001 HH PPS REVENUE CREDIT

## 2019-01-25 ENCOUNTER — TELEPHONE (OUTPATIENT)
Dept: HOME HEALTH SERVICES | Facility: HOME HEALTH | Age: 79
End: 2019-01-25

## 2019-01-25 ENCOUNTER — HOME CARE VISIT (OUTPATIENT)
Dept: SCHEDULING | Facility: HOME HEALTH | Age: 79
End: 2019-01-25
Payer: MEDICARE

## 2019-01-25 PROCEDURE — 3331090003 HH PPS REVENUE ADJ

## 2019-01-25 PROCEDURE — 3331090001 HH PPS REVENUE CREDIT

## 2019-01-25 PROCEDURE — 3331090002 HH PPS REVENUE DEBIT

## 2019-01-26 PROCEDURE — 3331090001 HH PPS REVENUE CREDIT

## 2019-01-26 PROCEDURE — 3331090002 HH PPS REVENUE DEBIT

## 2019-01-27 PROCEDURE — 3331090001 HH PPS REVENUE CREDIT

## 2019-01-27 PROCEDURE — 3331090002 HH PPS REVENUE DEBIT

## 2019-01-29 ENCOUNTER — PATIENT OUTREACH (OUTPATIENT)
Dept: CASE MANAGEMENT | Age: 79
End: 2019-01-29

## 2019-05-12 ENCOUNTER — HOSPITAL ENCOUNTER (EMERGENCY)
Age: 79
Discharge: HOME | End: 2019-05-12
Attending: EMERGENCY MEDICINE
Payer: MEDICARE

## 2019-05-12 ENCOUNTER — APPOINTMENT (OUTPATIENT)
Dept: CT IMAGING | Age: 79
End: 2019-05-12
Attending: EMERGENCY MEDICINE
Payer: MEDICARE

## 2019-05-12 ENCOUNTER — APPOINTMENT (OUTPATIENT)
Dept: GENERAL RADIOLOGY | Age: 79
End: 2019-05-12
Attending: EMERGENCY MEDICINE
Payer: MEDICARE

## 2019-05-12 VITALS
HEIGHT: 68 IN | DIASTOLIC BLOOD PRESSURE: 88 MMHG | SYSTOLIC BLOOD PRESSURE: 186 MMHG | WEIGHT: 167 LBS | RESPIRATION RATE: 18 BRPM | BODY MASS INDEX: 25.31 KG/M2 | HEART RATE: 76 BPM | OXYGEN SATURATION: 98 % | TEMPERATURE: 98 F

## 2019-05-12 DIAGNOSIS — R07.89 CHEST WALL PAIN: Primary | ICD-10-CM

## 2019-05-12 LAB
ANION GAP SERPL CALC-SCNC: 7 MMOL/L (ref 7–16)
ATRIAL RATE: 72 BPM
BASOPHILS # BLD: 0.1 K/UL (ref 0–0.2)
BASOPHILS NFR BLD: 1 % (ref 0–2)
BUN SERPL-MCNC: 18 MG/DL (ref 8–23)
CALCIUM SERPL-MCNC: 9.1 MG/DL (ref 8.3–10.4)
CALCULATED P AXIS, ECG09: 86 DEGREES
CALCULATED R AXIS, ECG10: 75 DEGREES
CALCULATED T AXIS, ECG11: 52 DEGREES
CHLORIDE SERPL-SCNC: 106 MMOL/L (ref 98–107)
CO2 SERPL-SCNC: 28 MMOL/L (ref 21–32)
CREAT SERPL-MCNC: 0.59 MG/DL (ref 0.6–1)
DIAGNOSIS, 93000: NORMAL
DIFFERENTIAL METHOD BLD: NORMAL
EOSINOPHIL # BLD: 0.2 K/UL (ref 0–0.8)
EOSINOPHIL NFR BLD: 2 % (ref 0.5–7.8)
ERYTHROCYTE [DISTWIDTH] IN BLOOD BY AUTOMATED COUNT: 13 % (ref 11.9–14.6)
GLUCOSE SERPL-MCNC: 101 MG/DL (ref 65–100)
HCT VFR BLD AUTO: 40.9 % (ref 35.8–46.3)
HGB BLD-MCNC: 13.5 G/DL (ref 11.7–15.4)
IMM GRANULOCYTES # BLD AUTO: 0 K/UL (ref 0–0.5)
IMM GRANULOCYTES NFR BLD AUTO: 0 % (ref 0–5)
LYMPHOCYTES # BLD: 2.2 K/UL (ref 0.5–4.6)
LYMPHOCYTES NFR BLD: 24 % (ref 13–44)
MCH RBC QN AUTO: 30.1 PG (ref 26.1–32.9)
MCHC RBC AUTO-ENTMCNC: 33 G/DL (ref 31.4–35)
MCV RBC AUTO: 91.1 FL (ref 79.6–97.8)
MONOCYTES # BLD: 1 K/UL (ref 0.1–1.3)
MONOCYTES NFR BLD: 11 % (ref 4–12)
NEUTS SEG # BLD: 5.9 K/UL (ref 1.7–8.2)
NEUTS SEG NFR BLD: 62 % (ref 43–78)
NRBC # BLD: 0 K/UL (ref 0–0.2)
P-R INTERVAL, ECG05: 168 MS
PLATELET # BLD AUTO: 260 K/UL (ref 150–450)
PMV BLD AUTO: 10.1 FL (ref 9.4–12.3)
POTASSIUM SERPL-SCNC: 3.9 MMOL/L (ref 3.5–5.1)
Q-T INTERVAL, ECG07: 376 MS
QRS DURATION, ECG06: 86 MS
QTC CALCULATION (BEZET), ECG08: 411 MS
RBC # BLD AUTO: 4.49 M/UL (ref 4.05–5.2)
SODIUM SERPL-SCNC: 141 MMOL/L (ref 136–145)
TROPONIN I BLD-MCNC: 0 NG/ML (ref 0.02–0.05)
VENTRICULAR RATE, ECG03: 72 BPM
WBC # BLD AUTO: 9.5 K/UL (ref 4.3–11.1)

## 2019-05-12 PROCEDURE — 99284 EMERGENCY DEPT VISIT MOD MDM: CPT | Performed by: EMERGENCY MEDICINE

## 2019-05-12 PROCEDURE — 74011636320 HC RX REV CODE- 636/320: Performed by: EMERGENCY MEDICINE

## 2019-05-12 PROCEDURE — 80048 BASIC METABOLIC PNL TOTAL CA: CPT

## 2019-05-12 PROCEDURE — 84484 ASSAY OF TROPONIN QUANT: CPT

## 2019-05-12 PROCEDURE — 74011000258 HC RX REV CODE- 258: Performed by: EMERGENCY MEDICINE

## 2019-05-12 PROCEDURE — 85025 COMPLETE CBC W/AUTO DIFF WBC: CPT

## 2019-05-12 PROCEDURE — 93005 ELECTROCARDIOGRAM TRACING: CPT | Performed by: EMERGENCY MEDICINE

## 2019-05-12 PROCEDURE — 71260 CT THORAX DX C+: CPT

## 2019-05-12 PROCEDURE — 71046 X-RAY EXAM CHEST 2 VIEWS: CPT

## 2019-05-12 RX ORDER — SODIUM CHLORIDE 0.9 % (FLUSH) 0.9 %
10 SYRINGE (ML) INJECTION
Status: COMPLETED | OUTPATIENT
Start: 2019-05-12 | End: 2019-05-12

## 2019-05-12 RX ADMIN — Medication 10 ML: at 08:19

## 2019-05-12 RX ADMIN — SODIUM CHLORIDE 100 ML: 900 INJECTION, SOLUTION INTRAVENOUS at 08:19

## 2019-05-12 RX ADMIN — IOPAMIDOL 100 ML: 755 INJECTION, SOLUTION INTRAVENOUS at 08:19

## 2019-05-12 NOTE — ED NOTES
I have reviewed discharge instructions with the patient. The patient verbalized understanding. Patient left ED via Discharge Method: ambulatory to Home with her  at bedside. Opportunity for questions and clarification provided. Patient given 0 scripts. To continue your aftercare when you leave the hospital, you may receive an automated call from our care team to check in on how you are doing. This is a free service and part of our promise to provide the best care and service to meet your aftercare needs.  If you have questions, or wish to unsubscribe from this service please call [phone]. Thank you for Choosing our [facility].

## 2019-05-12 NOTE — ED TRIAGE NOTES
Patient arrives from home with complaint of mid-chest pain. This pain started yesterday and continues this morning. Patient fell two days ago outside, hitting her chest.  Patient has a bruise on her chest where she landed on. States it hurts to move and take deep breaths.

## 2019-05-12 NOTE — ED PROVIDER NOTES
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 She comes to the ER this morning complaining of right-sided chest pain which started yesterday. She states she suffered an accidental fall in her yard one week ago and landed on her right chest.  There was no loss of consciousness. She felt fine but then yesterday started having pain in that area. There is some bruising. Pain is worse with movement or with deep breathing. States she also felt more short of breath. The history is provided by the patient. Chest Pain This is a new problem. The current episode started yesterday. The problem has not changed since onset. The pain is associated with movement and breathing. The pain is present in the left side. The pain is moderate. The quality of the pain is described as sharp. The pain does not radiate. The symptoms are aggravated by movement, palpation and deep breathing. Associated symptoms include shortness of breath. Pertinent negatives include no abdominal pain, no back pain, no cough, no diaphoresis, no fever, no irregular heartbeat, no nausea, no palpitations and no weakness. She has tried OTC pain medications for the symptoms. The treatment provided mild relief. Risk factors include hypertension and cardiac disease. Her past medical history is significant for HTN. Procedural history includes cardiac catheterization. Past Medical History:  
Diagnosis Date  Arthritis  CAD (coronary artery disease) clot in coronary artery in 2001 and it was dissolved with no further probelms.  Chronic pain   
 arthritis in knees  Dyslipidemia  GERD (gastroesophageal reflux disease)   
 controlled with med  History of basal cell cancer   
 multiple  History of peptic ulcer 2002 and 2014  
 HTN (hypertension)   
 controlled with med  Hx of colonic polyps  PUD (peptic ulcer disease)  Staph infection 2018 s/p left great toenail removal  
 
 
Past Surgical History:  
Procedure Laterality Date  HX BLADDER SUSPENSION  2006  HX CATARACT REMOVAL Right 2007  
 with IOL  HX CATARACT REMOVAL Left 2012  
 with IOL  HX COLONOSCOPY    
 multiple  HX COLONOSCOPY  09/23/2014  HX ENDOSCOPY    
 multiple  HX HEART CATHETERIZATION  2001, 2002--2013  
 no intervention-normal  
 HX HYSTERECTOMY  1990  
 HX KNEE ARTHROSCOPY Left 1995  
 knee scope  HX KNEE ARTHROSCOPY Right 1996  
 knee scope  HX KNEE ARTHROSCOPY Right 2007  
 knee scope  HX LAP CHOLECYSTECTOMY  1999  
 and ERCP  
 HX LUMBAR LAMINECTOMY  1978 and 1979  
 back surg--x [facility] Family History:  
Problem Relation Age of Onset  Lung Disease Father  Colon Cancer Father  Cancer Father   
     colon  Crohn's Disease Mother  Malignant Hyperthermia Neg Hx  Pseudocholinesterase Deficiency Neg Hx  Delayed Awakening Neg Hx  Post-op Nausea/Vomiting Neg Hx  Emergence Delirium Neg Hx  Post-op Cognitive Dysfunction Neg Hx   
 Other Neg Hx Social History Socioeconomic History  Marital status:  Spouse name: Not on file  Number of children: Not on file  Years of education: Not on file  Highest education level: Not on file Occupational History  Not on file Social Needs  Financial resource strain: Not on file  Food insecurity:  
  Worry: Not on file Inability: Not on file  Transportation needs:  
  Medical: Not on file Non-medical: Not on file Tobacco Use  Smoking status: Never Smoker  Smokeless tobacco: Never Used Substance and Sexual Activity  Alcohol use: Yes Alcohol/week: 1.5 oz Types: 3 Glasses of wine per week  Drug use: No  
 Sexual activity: Not on file Lifestyle  Physical activity:  
  Days per week: Not on file Minutes per session: Not on file  Stress: Not on file Relationships  Social connections:  
  Talks on phone: Not on file Gets together: Not on file Attends Caodaism service: Not on file Active member of club or organization: Not on file Attends meetings of clubs or organizations: Not on file Relationship status: Not on file  Intimate partner violence:  
  Fear of current or ex partner: Not on file Emotionally abused: Not on file Physically abused: Not on file Forced sexual activity: Not on file Other Topics Concern  Not on file Social History Narrative  Not on file ALLERGIES: Adhesive; Amoxicillin; Iodine; Iodine and iodide containing products; Mucinex [guaifenesin]; Penicillin; Shellfish containing products; Shellfish derived; Adhesive tape-silicones; Albuterol; Bactrim [sulfamethoprim ss]; Ciprofloxacin; Fish oil [docosahexanoic acid-epa]; Nsaids (non-steroidal anti-inflammatory drug); Omega 3 [omega-3 fatty acids]; Statins-hmg-coa reductase inhibitors; Sulfamethoxazole-trimethoprim; Tree nut; and Vit e-nonoxynol 9-aloe vera Review of Systems Constitutional: Negative for chills, diaphoresis, fatigue and fever. HENT: Negative for congestion, rhinorrhea and sore throat. Eyes: Negative for pain, discharge and visual disturbance. Respiratory: Positive for shortness of breath. Negative for cough. Cardiovascular: Positive for chest pain. Negative for palpitations. Gastrointestinal: Negative for abdominal pain, diarrhea and nausea. Endocrine: Negative for polydipsia and polyuria. Genitourinary: Negative for dysuria, frequency and urgency. Musculoskeletal: Negative for back pain and neck pain. Skin: Negative for rash. Neurological: Negative for seizures, syncope and weakness. Hematological: Negative. Vitals:  
 05/12/19 7808 BP: (!) 207/91 Pulse: 74 Resp: 20 Temp: 98 °F (36.7 °C) SpO2: 97% Weight: 75.8 kg (167 lb) Height: 5' 8\" (1.727 m) Physical Exam  
 Constitutional: She is oriented to person, place, and time. She appears well-developed and well-nourished. Uncomfortable appearing HENT:  
Head: Normocephalic and atraumatic. Eyes: Pupils are equal, round, and reactive to light. Conjunctivae and EOM are normal.  
Neck: Normal range of motion. Neck supple. Cardiovascular: Normal rate, regular rhythm and intact distal pulses. Pulmonary/Chest: Effort normal and breath sounds normal. She has no decreased breath sounds. Tender on the right anterior chest.  There is a small area of ecchymosis. Palpation reproduces her pain. Abdominal: Soft. There is no tenderness. There is no rebound and no guarding. Musculoskeletal: Normal range of motion. She exhibits no edema or deformity. Right lower leg: She exhibits no edema. Left lower leg: She exhibits no edema. Lymphadenopathy:  
  She has no cervical adenopathy. Neurological: She is alert and oriented to person, place, and time. She has normal strength. No cranial nerve deficit or sensory deficit. GCS eye subscore is 4. GCS verbal subscore is 5. GCS motor subscore is 6. Skin: Skin is warm and dry. No rash noted. Nursing note and vitals reviewed. MDM Number of Diagnoses or Management Options Diagnosis management comments: EKG reviewed by me shows normal sinus rhythm at a rate of 72 with no acute ischemia 
 
9:26 AM 
Blood work unremarkable Troponin negative Chest x-ray negative CT scan of the chest is negative for pulmonary embolism Patient is relieved with her negative workup today I think this is all chest wall pain. She has pain medication and Tylenol to use at home. I see no sign of acute coronary syndrome her EKG is nonischemic troponin negative and pain has been ongoing since yesterday so I see no need for repeat enzymes or further evaluation. Amount and/or Complexity of Data Reviewed Clinical lab tests: ordered and reviewed Tests in the radiology section of CPT®: ordered and reviewed Review and summarize past medical records: yes Risk of Complications, Morbidity, and/or Mortality Presenting problems: moderate Diagnostic procedures: moderate Management options: low Patient Progress Patient progress: stable Procedures

## 2020-02-03 PROBLEM — Z00.00 MEDICARE ANNUAL WELLNESS VISIT, SUBSEQUENT: Status: ACTIVE | Noted: 2020-02-03

## 2020-03-04 PROBLEM — Z00.00 MEDICARE ANNUAL WELLNESS VISIT, SUBSEQUENT: Status: RESOLVED | Noted: 2020-02-03 | Resolved: 2020-03-04

## 2021-09-08 ENCOUNTER — HOSPITAL ENCOUNTER (OUTPATIENT)
Dept: LAB | Age: 81
Discharge: HOME | End: 2021-09-08
Payer: MEDICARE

## 2021-09-08 DIAGNOSIS — I48.11 LONGSTANDING PERSISTENT ATRIAL FIBRILLATION (HCC): ICD-10-CM

## 2021-09-08 LAB — TSH W FREE THYROID I,TSHELE: 0.84 UIU/ML (ref 0.36–3.74)

## 2021-09-08 PROCEDURE — 84443 ASSAY THYROID STIM HORMONE: CPT

## 2021-09-08 PROCEDURE — 36415 COLL VENOUS BLD VENIPUNCTURE: CPT

## 2021-10-27 NOTE — PROGRESS NOTES
LMOM NPO after MN. Hold all medications except eliquis.  Arrived at front of D bring 1 person to drive home

## 2021-10-28 ENCOUNTER — HOSPITAL ENCOUNTER (OUTPATIENT)
Dept: CARDIAC CATH/INVASIVE PROCEDURES | Age: 81
Discharge: HOME | End: 2021-10-28
Attending: INTERNAL MEDICINE | Admitting: INTERNAL MEDICINE
Payer: MEDICARE

## 2021-10-28 VITALS
SYSTOLIC BLOOD PRESSURE: 125 MMHG | RESPIRATION RATE: 16 BRPM | HEART RATE: 47 BPM | WEIGHT: 175 LBS | HEIGHT: 66 IN | OXYGEN SATURATION: 98 % | DIASTOLIC BLOOD PRESSURE: 72 MMHG | BODY MASS INDEX: 28.12 KG/M2

## 2021-10-28 DIAGNOSIS — I48.91 ATRIAL FIBRILLATION, UNSPECIFIED TYPE (HCC): ICD-10-CM

## 2021-10-28 LAB
ALBUMIN SERPL-MCNC: 3.5 G/DL (ref 3.2–4.6)
ALBUMIN/GLOB SERPL: 0.9 {RATIO} (ref 1.2–3.5)
ALP SERPL-CCNC: 97 U/L (ref 50–136)
ALT SERPL-CCNC: 49 U/L (ref 12–65)
ANION GAP SERPL CALC-SCNC: 1 MMOL/L (ref 7–16)
AST SERPL-CCNC: 42 U/L (ref 15–37)
ATRIAL RATE: 48 BPM
ATRIAL RATE: 98 BPM
BILIRUB SERPL-MCNC: 0.8 MG/DL (ref 0.2–1.1)
BUN SERPL-MCNC: 21 MG/DL (ref 8–23)
CALCIUM SERPL-MCNC: 9.3 MG/DL (ref 8.3–10.4)
CALCULATED P AXIS, ECG09: 65 DEGREES
CALCULATED R AXIS, ECG10: 65 DEGREES
CALCULATED R AXIS, ECG10: 70 DEGREES
CALCULATED T AXIS, ECG11: 39 DEGREES
CALCULATED T AXIS, ECG11: 42 DEGREES
CHLORIDE SERPL-SCNC: 111 MMOL/L (ref 98–107)
CO2 SERPL-SCNC: 30 MMOL/L (ref 21–32)
CREAT SERPL-MCNC: 0.59 MG/DL (ref 0.6–1)
DIAGNOSIS, 93000: NORMAL
DIAGNOSIS, 93000: NORMAL
ERYTHROCYTE [DISTWIDTH] IN BLOOD BY AUTOMATED COUNT: 13.3 % (ref 11.9–14.6)
GLOBULIN SER CALC-MCNC: 4.1 G/DL (ref 2.3–3.5)
GLUCOSE SERPL-MCNC: 112 MG/DL (ref 65–100)
HCT VFR BLD AUTO: 40.6 % (ref 35.8–46.3)
HGB BLD-MCNC: 12.7 G/DL (ref 11.7–15.4)
MAGNESIUM SERPL-MCNC: 2.2 MG/DL (ref 1.8–2.4)
MCH RBC QN AUTO: 29.3 PG (ref 26.1–32.9)
MCHC RBC AUTO-ENTMCNC: 31.3 G/DL (ref 31.4–35)
MCV RBC AUTO: 93.8 FL (ref 79.6–97.8)
NRBC # BLD: 0 K/UL (ref 0–0.2)
P-R INTERVAL, ECG05: 212 MS
PLATELET # BLD AUTO: 213 K/UL (ref 150–450)
PMV BLD AUTO: 10.6 FL (ref 9.4–12.3)
POTASSIUM SERPL-SCNC: 4.4 MMOL/L (ref 3.5–5.1)
PROT SERPL-MCNC: 7.6 G/DL (ref 6.3–8.2)
Q-T INTERVAL, ECG07: 392 MS
Q-T INTERVAL, ECG07: 484 MS
QRS DURATION, ECG06: 86 MS
QRS DURATION, ECG06: 90 MS
QTC CALCULATION (BEZET), ECG08: 432 MS
QTC CALCULATION (BEZET), ECG08: 479 MS
RBC # BLD AUTO: 4.33 M/UL (ref 4.05–5.2)
SODIUM SERPL-SCNC: 142 MMOL/L (ref 136–145)
VENTRICULAR RATE, ECG03: 48 BPM
VENTRICULAR RATE, ECG03: 90 BPM
WBC # BLD AUTO: 7.9 K/UL (ref 4.3–11.1)

## 2021-10-28 PROCEDURE — 99153 MOD SED SAME PHYS/QHP EA: CPT

## 2021-10-28 PROCEDURE — 85027 COMPLETE CBC AUTOMATED: CPT

## 2021-10-28 PROCEDURE — 83735 ASSAY OF MAGNESIUM: CPT

## 2021-10-28 PROCEDURE — 92960 CARDIOVERSION ELECTRIC EXT: CPT | Performed by: INTERNAL MEDICINE

## 2021-10-28 PROCEDURE — 99152 MOD SED SAME PHYS/QHP 5/>YRS: CPT

## 2021-10-28 PROCEDURE — 99152 MOD SED SAME PHYS/QHP 5/>YRS: CPT | Performed by: INTERNAL MEDICINE

## 2021-10-28 PROCEDURE — 92960 CARDIOVERSION ELECTRIC EXT: CPT

## 2021-10-28 PROCEDURE — 74011250636 HC RX REV CODE- 250/636: Performed by: INTERNAL MEDICINE

## 2021-10-28 PROCEDURE — 93005 ELECTROCARDIOGRAM TRACING: CPT | Performed by: INTERNAL MEDICINE

## 2021-10-28 PROCEDURE — 80053 COMPREHEN METABOLIC PANEL: CPT

## 2021-10-28 RX ORDER — MIDAZOLAM HYDROCHLORIDE 1 MG/ML
.5-2 INJECTION, SOLUTION INTRAMUSCULAR; INTRAVENOUS
Status: DISCONTINUED | OUTPATIENT
Start: 2021-10-28 | End: 2021-10-28 | Stop reason: HOSPADM

## 2021-10-28 RX ORDER — FENTANYL CITRATE 50 UG/ML
25-50 INJECTION, SOLUTION INTRAMUSCULAR; INTRAVENOUS
Status: DISCONTINUED | OUTPATIENT
Start: 2021-10-28 | End: 2021-10-28 | Stop reason: HOSPADM

## 2021-10-28 RX ADMIN — MIDAZOLAM 2 MG: 1 INJECTION INTRAMUSCULAR; INTRAVENOUS at 14:52

## 2021-10-28 RX ADMIN — FENTANYL CITRATE 50 MCG: 0.05 INJECTION, SOLUTION INTRAMUSCULAR; INTRAVENOUS at 14:52

## 2021-10-28 RX ADMIN — MIDAZOLAM 1 MG: 1 INJECTION INTRAMUSCULAR; INTRAVENOUS at 14:56

## 2021-10-28 RX ADMIN — FENTANYL CITRATE 25 MCG: 0.05 INJECTION, SOLUTION INTRAMUSCULAR; INTRAVENOUS at 14:56

## 2021-10-28 NOTE — DISCHARGE INSTRUCTIONS
Patient Education        Chemical Cardioversion: Care Instructions  Your Care Instructions     Cardioversion resets your heart's rhythm to its normal pattern. It treats heart rhythm problems like atrial fibrillation or supraventricular tachycardia. Chemical cardioversion uses rhythm-control medicines to reset your heart. They can also help keep your heart in a normal rhythm after it has been reset. You may take this medicine as pills. Or you may get it in your arm through a tube called an IV. If you have an IV, it will be done in the hospital. If you use the pills, you might start them in the hospital. Or you might start the pills at home. Your doctor may ask you to take other medicines before your cardioversion. They can help keep blood clots from forming. And they can prevent the heart-rate problem from coming back. Sometimes the heart rate doesn't go back to normal. Or it may reset for a while and then go back to an uneven rate. If this happens, you may need electrical cardioversion. Follow-up care is a key part of your treatment and safety. Be sure to make and go to all appointments, and call your doctor if you are having problems. It's also a good idea to know your test results and keep a list of the medicines you take. How can you care for yourself at home? · Talk to your doctor about the benefits and risks of these medicines. They might cause serious side effects. Your doctor will want to see you often. Be sure to go to all of your doctor visits. · Take your medicines exactly as prescribed. Call your doctor if you think you are having a problem with your medicine. · Check with your doctor or pharmacist before you use any other medicines. This includes over-the-counter medicines. Make sure your doctor knows all of the medicines, vitamins, herbal products, and supplements you take. Taking some medicines together can cause problems. When should you call for help?    Call 911 anytime you think you may need emergency care. For example, call if:    · You passed out (lost consciousness).     · You have chest pain or pressure. This may occur with:  ? Sweating. ? Shortness of breath. ? Nausea or vomiting. ? Pain, pressure, or a strange feeling in your back, neck, jaw, or upper belly or in one or both shoulders or arms. ? Lightheadedness or sudden weakness. ? A fast or irregular heartbeat. After you call 911, the  may tell you to chew 1 adult-strength or 2 to 4 low-dose aspirin. Wait for an ambulance. Do not try to drive yourself.    · You have symptoms of a stroke. These may include:  ? Sudden numbness, tingling, weakness, or loss of movement in your face, arm, or leg, especially on only one side of your body. ? Sudden vision changes. ? Sudden trouble speaking. ? Sudden confusion or trouble understanding simple statements. ? Sudden problems with walking or balance. ? A sudden, severe headache that is different from past headaches. Call your doctor now or seek immediate medical care if:    · You are dizzy or lightheaded, or you feel like you may faint.     · You have a fast or irregular heartbeat. Watch closely for changes in your health, and be sure to contact your doctor if you are having any problems. Where can you learn more? Go to http://www.gray.com/  Enter V131 in the search box to learn more about \"Chemical Cardioversion: Care Instructions. \"  Current as of: April 29, 2021               Content Version: 13.0  © 2006-2021 Healthwise, [facility]. Care instructions adapted under license by [employer] (which disclaims liability or warranty for this information). If you have questions about a medical condition or this instruction, always ask your healthcare professional. [place] [address] any warranty or liability for your use of this information. Don't drive for 24 hours.                                     Jamin 97 Date: 10/28/21  Patient: Kathi Modoy,  Age: 79 y.o.,  Sex: female, (:1940)       POST MODERATE SEDATION PROCEDURES    1. If sedatives or painkillers have been administered to you, it is normal to feel a little dizzy and sleepy for several hours. These medications may affect your balance, coordination and reflexes, so you must not do things which require complete mental alertness, good balance, good coordination or quick reflexes for at least 24 hours. Examples of such activities are:  driving, cooking, operating machinery or power tools, or riding a bicycle. You should also stay away from sources of intense heat. 2. Some sedative medications can affect memory for several hours, so you should not sign any important papers for 24 hours. 3. The anesthetized area may be numb for several hours, so be careful not to pinch, cut or burn this area, as lack of pain will not alert you that an injury is occurring. 4. You may resume your usual diet, but it is better to start with liquids and gradually progress to solid foods. Do not drink alcoholic beverages for at least 24 hours following procedure. 5. If you have any unrelieved pain or vomiting, please call your physician. TELEPHONE NUMBERS    1. If you have questions about these instructions or about your recovery, call the Ambulatory Surgical unit between 7 a.m. and 4 p.m. at [phone].  2. You should call your surgeon with any questions relating to your surgery.       INSTRUCTIONS    ______________________________________________________________________________    ______________________________________________________________________________    ______________________________________________________________________________    Beryle Hamburger APPOINTMENT    The patient has an appointment with Dr. ______________________________________________________     Date: ___________________________________Time:________________________________      We provide this literature for patients and family members. It is intended to be educational supplement that highlights some of the important points of what we have previously discussed. I/We have been given an opportunity to ask questions. I/We certify this form has been fully explained to me/us, and I/We understand its contents. Patient/Guardian Signature: _______________________ Time: ____________ Date: _________    Nurse Signature: ____________________________  Time: ____________  Date: _______    Form #  - Tanja        Orig.  7/11  Rev. 12/14         K:\[employer]shop\Nursing\Forms\Post moderate sedation procedures

## 2021-10-28 NOTE — PROGRESS NOTES
Patient received to [address] [place] [address] room # 15  Ambulatory from [facility]. Patient scheduled for CVN today with Dr Ji Arora. Procedure reviewed & questions answered, voiced good understanding consent obtained & placed on chart. All medications and medical history reviewed. Will prep patient per orders. Patient & family updated on plan of care. The patient has a fraility score of 3-MANAGING WELL, based on age and ability to perform ADLs.

## 2021-10-28 NOTE — PROCEDURES
[facility]  PROCEDURE NOTE    Name:  Jessica Oquendo  MR#:  [id number]  :  1940  ACCOUNT #:  [de-identified]  DATE OF SERVICE:  10/28/2021    PREOPERATIVE DIAGNOSIS: Atrial fibrillation. POSTOPERATIVE DIAGNOSIS: Sinus rhythm. PROCEDURE PERFORMED:  Cardioversion. SURGEON:  Lui Gagnon MD    COMPLICATIONS: None. INDICATION:  Atrial fibrillation. PROCEDURE IN DETAIL:  After informed consent was obtained, the patient was brought to the  Informed consent was obtained the patient was brought to the prep and recovery area and subsequently hooked up to Zoll pads. After adequate sedation was obtained, the patient's rhythm was synced and she was shocked with 300 joules of biphasic energy which was successful on the first attempt. A total of 3 mg of Versed and 75 mcg of fentanyl was used for conscious sedation/anesthesia. Initiation time was 02:33 p.m. with end time at 02:58 p.m. There were no complications during the procedure. CONCLUSION:  Successful cardioversion with one attempt at 300 joules of biphasic energy which was successful on the first attempt. The patient is on anticoagulation. This will be continued. The patient has also been started on flecainide her primary cardiologist, which will be continued.         Hamzah David MD      [id number]  D:  10/28/2021 15:48  T:  10/28/2021 18:21  JOB #:  [id number]

## 2021-10-29 PROBLEM — I48.0 PAROXYSMAL ATRIAL FIBRILLATION (HCC): Status: ACTIVE | Noted: 2021-10-29

## 2021-11-08 ENCOUNTER — HOSPITAL ENCOUNTER (OUTPATIENT)
Dept: LAB | Age: 81
Discharge: HOME | End: 2021-11-08
Payer: MEDICARE

## 2021-11-08 DIAGNOSIS — I48.0 PAROXYSMAL ATRIAL FIBRILLATION (HCC): ICD-10-CM

## 2021-11-08 LAB
ANION GAP SERPL CALC-SCNC: 3 MMOL/L (ref 7–16)
BUN SERPL-MCNC: 17 MG/DL (ref 8–23)
CALCIUM SERPL-MCNC: 9.3 MG/DL (ref 8.3–10.4)
CHLORIDE SERPL-SCNC: 105 MMOL/L (ref 98–107)
CO2 SERPL-SCNC: 31 MMOL/L (ref 21–32)
CREAT SERPL-MCNC: 0.74 MG/DL (ref 0.6–1)
GLUCOSE SERPL-MCNC: 95 MG/DL (ref 65–100)
POTASSIUM SERPL-SCNC: 4.4 MMOL/L (ref 3.5–5.1)
SODIUM SERPL-SCNC: 139 MMOL/L (ref 136–145)
TSH W FREE THYROID I,TSHELE: 1.1 UIU/ML (ref 0.36–3.74)

## 2021-11-08 PROCEDURE — 84443 ASSAY THYROID STIM HORMONE: CPT

## 2021-11-08 PROCEDURE — 80048 BASIC METABOLIC PNL TOTAL CA: CPT

## 2021-11-08 PROCEDURE — 36415 COLL VENOUS BLD VENIPUNCTURE: CPT

## 2021-11-11 NOTE — PROGRESS NOTES
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 Patient verified using . Patient instructed to bring a list of all home medications on the day of procedure. NPO status reinforced. Patient instructed to take eliquis the morning of the procedure. Instructed they can take all other medications excluding vitamins & supplements. Patient verbalizes understanding of all instructions & denies any questions at this time.

## 2021-11-12 ENCOUNTER — HOSPITAL ENCOUNTER (OUTPATIENT)
Dept: CARDIAC CATH/INVASIVE PROCEDURES | Age: 81
Setting detail: OBSERVATION
Discharge: HOME | End: 2021-11-13
Attending: INTERNAL MEDICINE | Admitting: INTERNAL MEDICINE
Payer: MEDICARE

## 2021-11-12 DIAGNOSIS — R00.1 BRADYCARDIA: ICD-10-CM

## 2021-11-12 DIAGNOSIS — I10 ESSENTIAL HYPERTENSION: ICD-10-CM

## 2021-11-12 DIAGNOSIS — I48.0 AF (PAROXYSMAL ATRIAL FIBRILLATION) (HCC): ICD-10-CM

## 2021-11-12 DIAGNOSIS — I48.0 PAROXYSMAL ATRIAL FIBRILLATION (HCC): ICD-10-CM

## 2021-11-12 LAB
ATRIAL RATE: 49 BPM
CALCULATED R AXIS, ECG10: 84 DEGREES
CALCULATED T AXIS, ECG11: 40 DEGREES
DIAGNOSIS, 93000: NORMAL
Q-T INTERVAL, ECG07: 472 MS
QRS DURATION, ECG06: 108 MS
QTC CALCULATION (BEZET), ECG08: 479 MS
VENTRICULAR RATE, ECG03: 62 BPM

## 2021-11-12 PROCEDURE — 74011250637 HC RX REV CODE- 250/637: Performed by: NURSE PRACTITIONER

## 2021-11-12 PROCEDURE — G0378 HOSPITAL OBSERVATION PER HR: HCPCS

## 2021-11-12 PROCEDURE — 99153 MOD SED SAME PHYS/QHP EA: CPT

## 2021-11-12 PROCEDURE — 93005 ELECTROCARDIOGRAM TRACING: CPT | Performed by: INTERNAL MEDICINE

## 2021-11-12 PROCEDURE — 99152 MOD SED SAME PHYS/QHP 5/>YRS: CPT | Performed by: INTERNAL MEDICINE

## 2021-11-12 PROCEDURE — 74011000250 HC RX REV CODE- 250: Performed by: NURSE PRACTITIONER

## 2021-11-12 PROCEDURE — 2709999900 HC NON-CHARGEABLE SUPPLY

## 2021-11-12 PROCEDURE — 92960 CARDIOVERSION ELECTRIC EXT: CPT

## 2021-11-12 PROCEDURE — 74011250636 HC RX REV CODE- 250/636: Performed by: INTERNAL MEDICINE

## 2021-11-12 PROCEDURE — 99152 MOD SED SAME PHYS/QHP 5/>YRS: CPT

## 2021-11-12 PROCEDURE — 92960 CARDIOVERSION ELECTRIC EXT: CPT | Performed by: INTERNAL MEDICINE

## 2021-11-12 RX ORDER — FENTANYL CITRATE 50 UG/ML
25-50 INJECTION, SOLUTION INTRAMUSCULAR; INTRAVENOUS
Status: DISCONTINUED | OUTPATIENT
Start: 2021-11-12 | End: 2021-11-12

## 2021-11-12 RX ORDER — LOSARTAN POTASSIUM 50 MG/1
100 TABLET ORAL DAILY
Status: DISCONTINUED | OUTPATIENT
Start: 2021-11-13 | End: 2021-11-13 | Stop reason: HOSPADM

## 2021-11-12 RX ORDER — MIDAZOLAM HYDROCHLORIDE 1 MG/ML
.5-2 INJECTION, SOLUTION INTRAMUSCULAR; INTRAVENOUS
Status: DISCONTINUED | OUTPATIENT
Start: 2021-11-12 | End: 2021-11-12

## 2021-11-12 RX ORDER — PANTOPRAZOLE SODIUM 40 MG/1
40 TABLET, DELAYED RELEASE ORAL
Status: DISCONTINUED | OUTPATIENT
Start: 2021-11-13 | End: 2021-11-13 | Stop reason: HOSPADM

## 2021-11-12 RX ORDER — SODIUM CHLORIDE 0.9 % (FLUSH) 0.9 %
5-40 SYRINGE (ML) INJECTION AS NEEDED
Status: DISCONTINUED | OUTPATIENT
Start: 2021-11-12 | End: 2021-11-13 | Stop reason: HOSPADM

## 2021-11-12 RX ORDER — SODIUM CHLORIDE 0.9 % (FLUSH) 0.9 %
5-40 SYRINGE (ML) INJECTION EVERY 8 HOURS
Status: DISCONTINUED | OUTPATIENT
Start: 2021-11-12 | End: 2021-11-13 | Stop reason: HOSPADM

## 2021-11-12 RX ORDER — SERTRALINE HYDROCHLORIDE 50 MG/1
50 TABLET, FILM COATED ORAL DAILY
Status: DISCONTINUED | OUTPATIENT
Start: 2021-11-13 | End: 2021-11-13 | Stop reason: HOSPADM

## 2021-11-12 RX ORDER — SODIUM CHLORIDE 9 MG/ML
75 INJECTION, SOLUTION INTRAVENOUS CONTINUOUS
Status: DISCONTINUED | OUTPATIENT
Start: 2021-11-12 | End: 2021-11-12

## 2021-11-12 RX ORDER — METHENAMINE HIPPURATE 1000 MG/1
1 TABLET ORAL 2 TIMES DAILY WITH MEALS
Status: DISCONTINUED | OUTPATIENT
Start: 2021-11-12 | End: 2021-11-13 | Stop reason: HOSPADM

## 2021-11-12 RX ORDER — TRIAMTERENE/HYDROCHLOROTHIAZID 37.5-25 MG
1 TABLET ORAL DAILY
Status: DISCONTINUED | OUTPATIENT
Start: 2021-11-13 | End: 2021-11-13 | Stop reason: HOSPADM

## 2021-11-12 RX ORDER — MELOXICAM 7.5 MG/1
7.5 TABLET ORAL
Status: DISCONTINUED | OUTPATIENT
Start: 2021-11-12 | End: 2021-11-13 | Stop reason: HOSPADM

## 2021-11-12 RX ORDER — MELOXICAM 7.5 MG/1
15 TABLET ORAL DAILY
Status: DISCONTINUED | OUTPATIENT
Start: 2021-11-13 | End: 2021-11-13 | Stop reason: HOSPADM

## 2021-11-12 RX ORDER — NITROGLYCERIN 0.4 MG/1
0.4 TABLET SUBLINGUAL AS NEEDED
Status: DISCONTINUED | OUTPATIENT
Start: 2021-11-12 | End: 2021-11-13 | Stop reason: HOSPADM

## 2021-11-12 RX ORDER — MORPHINE SULFATE 2 MG/ML
2 INJECTION, SOLUTION INTRAMUSCULAR; INTRAVENOUS
Status: DISCONTINUED | OUTPATIENT
Start: 2021-11-12 | End: 2021-11-13 | Stop reason: HOSPADM

## 2021-11-12 RX ORDER — OXYBUTYNIN CHLORIDE 10 MG/1
10 TABLET, EXTENDED RELEASE ORAL 2 TIMES DAILY
Status: DISCONTINUED | OUTPATIENT
Start: 2021-11-12 | End: 2021-11-13 | Stop reason: HOSPADM

## 2021-11-12 RX ORDER — FLECAINIDE ACETATE 50 MG/1
100 TABLET ORAL 2 TIMES DAILY
Status: DISCONTINUED | OUTPATIENT
Start: 2021-11-12 | End: 2021-11-13

## 2021-11-12 RX ORDER — ISOSORBIDE MONONITRATE 30 MG/1
30 TABLET, EXTENDED RELEASE ORAL DAILY
Status: DISCONTINUED | OUTPATIENT
Start: 2021-11-13 | End: 2021-11-13 | Stop reason: HOSPADM

## 2021-11-12 RX ORDER — ACETAMINOPHEN 325 MG/1
650 TABLET ORAL
Status: DISCONTINUED | OUTPATIENT
Start: 2021-11-12 | End: 2021-11-13 | Stop reason: HOSPADM

## 2021-11-12 RX ORDER — LATANOPROST 50 UG/ML
1 SOLUTION/ DROPS OPHTHALMIC EVERY EVENING
Status: DISCONTINUED | OUTPATIENT
Start: 2021-11-12 | End: 2021-11-13 | Stop reason: HOSPADM

## 2021-11-12 RX ORDER — PREGABALIN 25 MG/1
50 CAPSULE ORAL
Status: DISCONTINUED | OUTPATIENT
Start: 2021-11-12 | End: 2021-11-13

## 2021-11-12 RX ADMIN — LATANOPROST 1 DROP: 50 SOLUTION OPHTHALMIC at 21:30

## 2021-11-12 RX ADMIN — OXYBUTYNIN CHLORIDE 10 MG: 10 TABLET, EXTENDED RELEASE ORAL at 21:30

## 2021-11-12 RX ADMIN — MIDAZOLAM 1 MG: 1 INJECTION INTRAMUSCULAR; INTRAVENOUS at 08:57

## 2021-11-12 RX ADMIN — MIDAZOLAM 1 MG: 1 INJECTION INTRAMUSCULAR; INTRAVENOUS at 08:37

## 2021-11-12 RX ADMIN — Medication 10 ML: at 14:05

## 2021-11-12 RX ADMIN — MELOXICAM 7.5 MG: 7.5 TABLET ORAL at 15:11

## 2021-11-12 RX ADMIN — ACETAMINOPHEN 650 MG: 325 TABLET ORAL at 21:30

## 2021-11-12 RX ADMIN — APIXABAN 5 MG: 5 TABLET, FILM COATED ORAL at 21:30

## 2021-11-12 RX ADMIN — FENTANYL CITRATE 25 MCG: 50 INJECTION, SOLUTION INTRAMUSCULAR; INTRAVENOUS at 08:37

## 2021-11-12 RX ADMIN — Medication 10 ML: at 21:31

## 2021-11-12 RX ADMIN — MIDAZOLAM 1 MG: 1 INJECTION INTRAMUSCULAR; INTRAVENOUS at 08:58

## 2021-11-12 RX ADMIN — FENTANYL CITRATE 50 MCG: 50 INJECTION, SOLUTION INTRAMUSCULAR; INTRAVENOUS at 08:57

## 2021-11-12 NOTE — PROGRESS NOTES
TRANSFER - OUT REPORT:    Verbal report given to Ilia Figueroa RN(name) on Nya Cisse  being transferred to tele(unit) for routine progression of care       Report consisted of patients Situation, Background, Assessment and   Recommendations(SBAR). Information from the following report(s) Procedure Summary was reviewed with the receiving nurse. Lines:   Peripheral IV 11/12/21 Anterior; Right Forearm (Active)        Opportunity for questions and clarification was provided.       Patient transported with:   [employer]

## 2021-11-12 NOTE — PROGRESS NOTES
11/12/21 1325   Dual Skin Pressure Injury Assessment   Dual Skin Pressure Injury Assessment WDL   Second Care Provider (Based on [address] [place] [address]) Dolores RN    Skin Integumentary   Skin Integumentary (WDL) X    Pressure  Injury Documentation No Pressure Injury Noted-Pressure Ulcer Prevention Initiated   Skin Color Ecchymosis (comment)   Skin Condition/Temp Warm; Dry   Skin Integrity Abrasion  (scattered)   Turgor Non-tenting   Varicosities Present

## 2021-11-12 NOTE — PROGRESS NOTES
Pt HR staying in low 40's with short pauses. Dr. Tani Castillo notified and came to bs.   Continue to monitor

## 2021-11-12 NOTE — PROGRESS NOTES
11/12/21 1325   Dual Skin Pressure Injury Assessment   Dual Skin Pressure Injury Assessment WDL   Second Care Provider (Based on [address] [place] [address]) Dolores RN    Skin Integumentary   Skin Integumentary (WDL) X    Pressure  Injury Documentation No Pressure Injury Noted-Pressure Ulcer Prevention Initiated   Skin Color Ecchymosis (comment)   Skin Condition/Temp Warm; Dry   Skin Integrity Abrasion  (scattered)   Turgor Non-tenting   Varicosities Present

## 2021-11-12 NOTE — PROGRESS NOTES
Patient received to [address] [place] [address] room # 8  Ambulatory from [facility]. Patient scheduled for CVN today with Dr Britt Neri. Procedure reviewed & questions answered, voiced good understanding consent obtained & placed on chart. All medications and medical history reviewed. Will prep patient per orders. Patient & family updated on plan of care. The patient has a fraility score of 3-MANAGING WELL, based on ability to perform ADLS by self.

## 2021-11-12 NOTE — PROGRESS NOTES
TRANSFER - IN REPORT:    Verbal report received from Bruno Salmon RN(name) on [employer]  being received from [facility](unit) for routine progression of care      Report consisted of patients Situation, Background, Assessment and   Recommendations(SBAR). Information from the following report(s) Kardex and Procedure Summary was reviewed with the receiving nurse. Opportunity for questions and clarification was provided. Assessment will be completed upon patients arrival to unit and care will be assumed.

## 2021-11-13 VITALS
RESPIRATION RATE: 18 BRPM | DIASTOLIC BLOOD PRESSURE: 67 MMHG | HEART RATE: 50 BPM | SYSTOLIC BLOOD PRESSURE: 117 MMHG | BODY MASS INDEX: 28.6 KG/M2 | TEMPERATURE: 97.9 F | OXYGEN SATURATION: 95 % | WEIGHT: 177.2 LBS

## 2021-11-13 PROBLEM — I48.0 PAROXYSMAL ATRIAL FIBRILLATION (HCC): Status: RESOLVED | Noted: 2021-10-29 | Resolved: 2021-11-13

## 2021-11-13 LAB
ANION GAP SERPL CALC-SCNC: 5 MMOL/L (ref 7–16)
BUN SERPL-MCNC: 24 MG/DL (ref 8–23)
CALCIUM SERPL-MCNC: 9 MG/DL (ref 8.3–10.4)
CHLORIDE SERPL-SCNC: 108 MMOL/L (ref 98–107)
CO2 SERPL-SCNC: 28 MMOL/L (ref 21–32)
CREAT SERPL-MCNC: 0.74 MG/DL (ref 0.6–1)
ERYTHROCYTE [DISTWIDTH] IN BLOOD BY AUTOMATED COUNT: 13.6 % (ref 11.9–14.6)
GLUCOSE SERPL-MCNC: 98 MG/DL (ref 65–100)
HCT VFR BLD AUTO: 42.4 % (ref 35.8–46.3)
HGB BLD-MCNC: 12.7 G/DL (ref 11.7–15.4)
MAGNESIUM SERPL-MCNC: 2.3 MG/DL (ref 1.8–2.4)
MCH RBC QN AUTO: 29.2 PG (ref 26.1–32.9)
MCHC RBC AUTO-ENTMCNC: 30 G/DL (ref 31.4–35)
MCV RBC AUTO: 97.5 FL (ref 79.6–97.8)
NRBC # BLD: 0 K/UL (ref 0–0.2)
PLATELET # BLD AUTO: 223 K/UL (ref 150–450)
PMV BLD AUTO: 10.4 FL (ref 9.4–12.3)
POTASSIUM SERPL-SCNC: 4.2 MMOL/L (ref 3.5–5.1)
RBC # BLD AUTO: 4.35 M/UL (ref 4.05–5.2)
SODIUM SERPL-SCNC: 141 MMOL/L (ref 136–145)
WBC # BLD AUTO: 8.3 K/UL (ref 4.3–11.1)

## 2021-11-13 PROCEDURE — 99220 PR INITIAL OBSERVATION CARE/DAY 70 MINUTES: CPT | Performed by: INTERNAL MEDICINE

## 2021-11-13 PROCEDURE — 36415 COLL VENOUS BLD VENIPUNCTURE: CPT

## 2021-11-13 PROCEDURE — 85027 COMPLETE CBC AUTOMATED: CPT

## 2021-11-13 PROCEDURE — 74011250637 HC RX REV CODE- 250/637: Performed by: NURSE PRACTITIONER

## 2021-11-13 PROCEDURE — 80048 BASIC METABOLIC PNL TOTAL CA: CPT

## 2021-11-13 PROCEDURE — G0378 HOSPITAL OBSERVATION PER HR: HCPCS

## 2021-11-13 PROCEDURE — 83735 ASSAY OF MAGNESIUM: CPT

## 2021-11-13 PROCEDURE — 74011250636 HC RX REV CODE- 250/636: Performed by: INTERNAL MEDICINE

## 2021-11-13 PROCEDURE — 90471 IMMUNIZATION ADMIN: CPT

## 2021-11-13 PROCEDURE — 74011250637 HC RX REV CODE- 250/637: Performed by: INTERNAL MEDICINE

## 2021-11-13 PROCEDURE — 90686 IIV4 VACC NO PRSV 0.5 ML IM: CPT | Performed by: INTERNAL MEDICINE

## 2021-11-13 RX ADMIN — OXYBUTYNIN CHLORIDE 10 MG: 10 TABLET, EXTENDED RELEASE ORAL at 08:40

## 2021-11-13 RX ADMIN — INFLUENZA VIRUS VACCINE 0.5 ML: 15; 15; 15; 15 SUSPENSION INTRAMUSCULAR at 12:35

## 2021-11-13 RX ADMIN — MELOXICAM 15 MG: 7.5 TABLET ORAL at 08:41

## 2021-11-13 RX ADMIN — Medication 10 ML: at 06:25

## 2021-11-13 RX ADMIN — DRONEDARONE 400 MG: 400 TABLET, FILM COATED ORAL at 09:39

## 2021-11-13 RX ADMIN — APIXABAN 5 MG: 5 TABLET, FILM COATED ORAL at 08:41

## 2021-11-13 RX ADMIN — PANTOPRAZOLE SODIUM 40 MG: 40 TABLET, DELAYED RELEASE ORAL at 06:25

## 2021-11-13 RX ADMIN — SERTRALINE 50 MG: 50 TABLET, FILM COATED ORAL at 08:40

## 2021-11-13 RX ADMIN — ISOSORBIDE MONONITRATE 30 MG: 30 TABLET, EXTENDED RELEASE ORAL at 08:41

## 2021-11-13 NOTE — PROGRESS NOTES
Problem: Falls - Risk of  Goal: *Absence of Falls  Description: Document [place] Flow Fall Risk and appropriate interventions in the flowsheet.   Outcome: Progressing Towards Goal  Note: Fall Risk Interventions:            Medication Interventions: Teach patient to arise slowly, Patient to call before getting OOB, Evaluate medications/consider consulting pharmacy

## 2021-11-13 NOTE — DISCHARGE INSTRUCTIONS
Currently holding all BP medications. The patient will also check BP twice daily and add in half dose of her cozarr and can take HZTC PRN for Lower limb edema. AFTER YOU TRANSESOPHAGEAL ECHOCARDIOGRAM    Be sure someone else drives you home. You may feel drowsy for several hours. Do not eat or drink for at least two hours after your procedure. Your throat will be numb and there is a risk you might have difficulty swallowing for a while. Be careful when you do eat or drink for the first time especially with hot fluids since you could easily burn your throat. Call your doctor if:    · You are bleeding from your throat or mouth. · You have trouble breathing all of a sudden. · You have chest pain or any pain that spreads to your neck, jaw, or arms. · You have questions or concerns. · You have a fever greater than 101°F.    Doctor: [facility] [address] Cardiology [phone]    Special Instructions:    No driving for 24 hours. Decrease metoprolol to 50mg    Patient Education        Electrical Cardioversion: What to Expect at [address] [place] [address]     Electrical cardioversion is a treatment for an abnormal heartbeat, such as atrial fibrillation, supraventricular tachycardia, or ventricular tachycardia (VT). Your doctor used a brief electrical shock to reset your heart's rhythm. After the procedure, you may have redness, like a sunburn, where the patches were. The medicines you got to make you sleepy may make you feel drowsy for the rest of the day. Your doctor may have you take medicines to help the heart beat normally and to prevent blood clots. This care sheet gives you a general idea about how long it will take for you to recover. But each person recovers at a different pace. Follow the steps below to feel better as quickly as possible. How can you care for yourself at home? Medicines    · Be safe with medicines. Take your medicines exactly as prescribed.  Call your doctor if you think you are having a problem with your medicine. You may take one or more of the following medicines:  ? Rate-control medicines to slow the heart rate. These include beta-blockers, calcium channel blockers, and digoxin. ? Rhythm control medicines that help the heart keep a normal rhythm. ? Blood thinners, also called anticoagulants, which help prevent blood clots. You will get more details on the specific medicines your doctor prescribes. Be sure you know how to take your medicines safely.     · Do not take any vitamins, over-the-counter medicines, or herbal products without talking to your doctor first.   Exercise    · Start light exercise if your doctor says that it's okay. Even a small amount will help you get stronger, have more energy, and manage your stress. Walking is an easy way to get exercise. Start out by walking a little more than you did in the hospital. Bit by bit, increase the amount you walk.     · When you exercise, watch for signs that your heart is working too hard. You are pushing too hard if you cannot talk while you are exercising. If you become short of breath or dizzy or have chest pain, sit down and rest right away.     · Check your pulse regularly. Place two fingers on the artery at the palm side of your wrist in line with your thumb. If your heartbeat seems uneven or fast, talk to your doctor. Other instructions    · Ask your doctor when you can drive again.     · Do not smoke. If you need help quitting, talk to your doctor about stop-smoking programs and medicines. These can increase your chances of quitting for good.     · Limit alcohol. Follow-up care is a key part of your treatment and safety. Be sure to make and go to all appointments, and call your doctor if you are having problems. It's also a good idea to know your test results and keep a list of the medicines you take. When should you call for help? Call 911 anytime you think you may need emergency care.  For example, call if:    · You passed out (lost consciousness).     · You have chest pain or pressure. This may occur with:  ? Sweating. ? Shortness of breath. ? Nausea or vomiting. ? Pain that spreads from the chest to the neck, jaw, or one or both shoulders or arms. ? A fast or uneven pulse. After calling 911, the  may tell you to chew 1 adult-strength or 2 to 4 low-dose aspirin. Wait for an ambulance. Do not try to drive yourself.     · You have symptoms of a stroke. These may include:  ? Sudden numbness, tingling, weakness, or loss of movement in your face, arm, or leg, especially on only one side of your body. ? Sudden vision changes. ? Sudden trouble speaking. ? Sudden confusion or trouble understanding simple statements. ? Sudden problems with walking or balance. ? A sudden, severe headache that is different from past headaches. Call your doctor now or seek immediate medical care if:    · You feel dizzy or lightheaded, or you feel like you may faint.     · You have a fast or irregular heartbeat. Watch closely for any changes in your health, and be sure to contact your doctor if you have any problems. Where can you learn more? Go to http://www.gray.com/  Enter A617 in the search box to learn more about \"Electrical Cardioversion: What to Expect at Home. \"  Current as of: April 29, 2021               Content Version: 13.0  © 2006-2021 [email or web address]. Care instructions adapted under license by [employer] (which disclaims liability or warranty for this information). If you have questions about a medical condition or this instruction, always ask your healthcare professional. [place] [address] any warranty or liability for your use of this information. Patient Education      Dronedarone (Multaq) - (By mouth)   Why this medicine is used:   Treats heart rhythm problems.   Contact a nurse or doctor right away if you have:  · Fast, slow, pounding, or uneven heartbeat; lightheadedness, dizziness  · Rapid weight gain, swelling in your hands, ankles, or feet  · Yellow skin or eyes  · Dark urine or pale stools, change in how much or how often you urinate  · Vomiting, stomach pain, loss of appetite     Common side effects:  · Diarrhea, nausea, stomach pain  · Weakness  © 2017 [facility] Information is for End User's use only and may not be sold, redistributed or otherwise used for commercial purposes.

## 2021-11-13 NOTE — PROGRESS NOTES
Pt to discharge home today. Met with pt at bedside. Pt out of pocket cost for new medication, Miltaq, is $180. Pt aware or retail cost and this price is with insurance assistance. Pt agreeable to pay. Spouse to transport home. All needs met. CM available if any new needs arise. Care Management Interventions  PCP Verified by CM: Yes  Mode of Transport at Discharge:  Other (see comment) (spouse )  Transition of Care Consult (CM Consult): Discharge Planning  Discharge Durable Medical Equipment: No  Physical Therapy Consult: No  Occupational Therapy Consult: No  Speech Therapy Consult: No  Support Systems: Spouse/Significant Other  Confirm Follow Up Transport: Family  The Plan for Transition of Care is Related to the Following Treatment Goals : Return to baseline  Freedom of Choice List was Provided with Basic Dialogue that Supports the Patient's Individualized Plan of Care/Goals, Treatment Preferences and Shares the Quality Data Associated with the Providers?: Yes  Discharge Location  Discharge Placement: Home

## 2021-11-13 NOTE — ROUTINE PROCESS
Discharge instructions reviewed with Patient. Prescriptions given for Patient and med info sheets provided for all new medications. Opportunity for questions provided. Patient voiced understanding of all discharge instructions. IV and telemetry d/c.

## 2021-11-13 NOTE — DISCHARGE SUMMARY
[address] [facility] Cardiology Discharge Summary     Patient ID:  Nhi Rebolledo  [id number]  23 y.o.  1940    Admit date: 11/12/2021    Discharge date:  11/13/21    Admitting Physician: Romeo Boucher MD     Discharge Physician: Codey Lorenzana NP/Dr. Nadine Toledo    Admission Diagnoses:   Bradycardia [R00.1] PAF s/p CVN    Discharge Diagnoses: Active Hospital Problems    Diagnosis Date Noted    Bradycardia 11/12/2021     Priority: 1 - One         Cardiology Procedures this admission:  ELENA/Cardioversion  Consults: None    Hospital Course: Patient presented to the emergency department of [facility] with complaints of palpitations and shortness of breath. The patient was noted to be in atrial fibrillation with RVR on arrival.  A Cardizem bolus was given in the ER with good response. The patient was admitted to telemetry and Cardizem drip was continued. There was continued of Eliquis for anticoagulation. The patient was monitored on telemetry. ELENA/Cardioversion was planned. The patient underwent ELENA that was negative for thrombus and then underwent successful cardioversion from atrial fibrillation to sinus rhythm. The patient had profound bradycardia post CVN with some hypotension and was placed in 23 hour observation. After review of medication the patient home medications were held and she will start on Multaq with social work referral for cost and co-pay card. The patient felt much better back in sinus rhythm. The morning of 11/13/21, the patient was feeling much better and remained in sinus rhythm. The patient was seen and examined by Dr. Nadine Toledo and was determined stable and ready for discharge home. The patient was instructed on the importance of medication compliance and outpatient follow up. The patient will follow up with [address] [place] [address] [address] Dr. Nadine Toledo in 4-6 weeks.   The patient will also check BP twice daily and add in half dose of her cozarr and can take HZTC PRN for Lower limb edema. DISPOSITION: The patient is being discharged home in stable condition on a low saturated fat, low cholesterol and low salt diet. The patient is instructed to advance activities as tolerated to the limit of fatigue or shortness of breath. The patient is instructed to call the office or return to the ER for immediate evaluation for any severe shortness of breath, chest pain, prolonged palpitations, near syncope or syncope. Discharge Exam:   Visit Vitals  /67 (BP 1 Location: Left upper arm, BP Patient Position: At rest)   Pulse (!) 50   Temp 97.9 °F (36.6 °C)   Resp 18   Wt 80.4 kg (177 lb 3.2 oz)   SpO2 95%   Breastfeeding No   BMI 28.60 kg/m²     Patient has been seen by Dr. Royal Torres: see his progress note for exam details.     Recent Results (from the past 24 hour(s))   METABOLIC PANEL, BASIC    Collection Time: 11/13/21  4:16 AM   Result Value Ref Range    Sodium 141 136 - 145 mmol/L    Potassium 4.2 3.5 - 5.1 mmol/L    Chloride 108 (H) 98 - 107 mmol/L    CO2 28 21 - 32 mmol/L    Anion gap 5 (L) 7 - 16 mmol/L    Glucose 98 65 - 100 mg/dL    BUN 24 (H) 8 - 23 MG/DL    Creatinine 0.74 0.6 - 1.0 MG/DL    GFR est AA >60 >60 ml/min/1.73m2    GFR est non-AA >60 >60 ml/min/1.73m2    Calcium 9.0 8.3 - 10.4 MG/DL   CBC W/O DIFF    Collection Time: 11/13/21  4:16 AM   Result Value Ref Range    WBC 8.3 4.3 - 11.1 K/uL    RBC 4.35 4.05 - 5.2 M/uL    HGB 12.7 11.7 - 15.4 g/dL    HCT 42.4 35.8 - 46.3 %    MCV 97.5 79.6 - 97.8 FL    MCH 29.2 26.1 - 32.9 PG    MCHC 30.0 (L) 31.4 - 35.0 g/dL    RDW 13.6 11.9 - 14.6 %    PLATELET 904 311 - 478 K/uL    MPV 10.4 9.4 - 12.3 FL    ABSOLUTE NRBC 0.00 0.0 - 0.2 K/uL   MAGNESIUM    Collection Time: 11/13/21  4:16 AM   Result Value Ref Range    Magnesium 2.3 1.8 - 2.4 mg/dL         Patient Instructions:   Current Discharge Medication List      START taking these medications    Details   dronedarone (MULTAQ) tab tablet Take 1 Tablet by mouth two (2) times daily (with meals). Qty: 60 Tablet, Refills: 5         CONTINUE these medications which have NOT CHANGED    Details   isosorbide mononitrate ER (IMDUR) 30 mg tablet TAKE 1 TABLET BY MOUTH EVERY MORNING  Qty: 90 Tablet, Refills: 3      apixaban (Eliquis) 5 mg tablet Take 1 Tablet by mouth two (2) times a day. Qty: 60 Tablet, Refills: 5      pantoprazole (PROTONIX) 40 mg tablet TAKE 1 TABLET BY MOUTH DAILY      methenamine hippurate (HIPREX) 1 gram tablet TAKE 1 TABLET BY MOUTH TWICE DAILY WITH MEALS  Qty: 180 Tab, Refills: 3      dicyclomine (BENTYL) 10 mg capsule 1 cap po Q8H PRN Diarrhea and cramps. Max daily amount 30 mg  Qty: 30 Cap, Refills: 1    Associated Diagnoses: Other irritable bowel syndrome      sertraline (ZOLOFT) 50 mg tablet Take 1 Tab by mouth daily. Qty: 90 Tab, Refills: 3    Associated Diagnoses: Mild depression (HCC)      oxybutynin chloride XL (DITROPAN XL) 10 mg CR tablet Take 1 Tab by mouth two (2) times a day. Qty: 180 Tab, Refills: 3      bimatoprost (LUMIGAN) 0.01 % ophthalmic drops Administer 1 Drop to both eyes every evening. meloxicam (MOBIC) 7.5 mg tablet Take 1-2 Tabs by mouth daily as needed for Pain. Qty: 30 Tab, Refills: 3    Associated Diagnoses: Arthralgia, unspecified joint      nitroglycerin (NITROSTAT) 0.4 mg SL tablet DISSOLVE 1 TABLET UNDER TONGUE EVERY 5 MINUTES AS NEEDED FOR CHEST PAIN  Qty: 25 Tab, Refills: 3    Comments: **Patient requests 90 days supply**      acetaminophen (TYLENOL ARTHRITIS PAIN) 650 mg CR tablet Take 650 mg by mouth every six (6) hours as needed for Pain. Lotemax SM 0.38 % drpg       bromfenac (Prolensa) 0.07 % ophthalmic solution Administer 1 Drop to right eye daily. pregabalin (LYRICA) 50 mg capsule Take 1 Cap by mouth nightly.  Max Daily Amount: 50 mg.  Qty: 90 Cap, Refills: 1    Associated Diagnoses: Peripheral polyneuropathy      magic mouthwash solution Magic mouth wash   Maalox  Lidocaine 2% viscous   Diphenhydramine oral solution Pharmacy to mix equal portions of ingredients to a total volume as indicated in the dispense amount.   Qty: 320 mL, Refills: 0      hydrOXYzine HCl (ATARAX) 10 mg tablet prn    Comments: take for itching and sleep         STOP taking these medications       flecainide (TAMBOCOR) 100 mg tablet Comments:   Reason for Stopping:         metoprolol succinate (TOPROL-XL) 100 mg tablet Comments:   Reason for Stopping:         losartan (COZAAR) 100 mg tablet Comments:   Reason for Stopping:         triamterene-hydroCHLOROthiazide (MAXZIDE) 37.5-25 mg per tablet Comments:   Reason for Stopping:                 Signed:  Jim Brito NP  11/13/2021  9:11 AM

## 2021-11-13 NOTE — CONSULTS
[facility] CARDIOLOGY  [address] [place] [address], [facility], [address] [place] [address]  PHONE: [phone]        21        NAME:  Daisy Yu  : 1940  MRN: [id number]     Referring Cardiologist: Lona Farris MD and Darby Valverde MD    Reason for Consultation: Atrial fibrillation     ASSESSMENT and PLAN:  Diagnoses and all orders for this visit:    1. AF (paroxysmal atrial fibrillation) ([address]  2. Symptomatic bradycardia     79year old female with pAF who is highly symptomatic when in AF. She is likely mildly persistent AF. She has an elevated risk of stroke and is on stroke ppx with Eliquis. We discussed further treatment options for AF. Her main issue is that she's bradycardic when in rhythm and the ability to increase AV node blockade/AAD dosages will be difficult. I do think she would benefit from a PPM, possibly a PPM with atrial therapies. For now stopping metoprolol and flecainide and starting Multaq may buy her some time. Multaq can be expensive but medically it's a good option. If she is failing medical therapy in follow up, will further discuss PPM option. -AF mgmt as above. -Restart anti-hypertensives as needed. -EP follow up in 4-6 weeks.  -Routine cardiac care per Dr. Jeremy Salas. Atrial Fibrillation CHADSVASC2 Score Stroke Risk:   79 y.o. > 76        +2    female Female +1   CHF HX: No    + 0   HTN HX: Yes    +1   Stroke/TIA/Thromboembolism No    +0   Vascular Disease HX: No    + 0   Diabetes Mellitus No    + 0   CHADSVASC 2 Score 4      Annual Stroke Risk 4.8%- moderate-high        Thank you for allowing me to participate in the electrophysiologic care of Ms. Thelma Perry. Please contact me if any questions or concerns were to arise. Kameron Shanks. Margarita MALCOLM, MS  Clinical Cardiac Electrophysiology  [facility] Cardiology  21  9:02 AM    ===================================================================  Chief Complant:  No chief complaint on file.        Consultation is requested by Flori Hammond for evaluation of No chief complaint on file. History:  Mehdi Schmid is a most pleasant 79 y.o. female with a past medical and cardiac history significant for drug refractory pAF. She follows with Dr. Rl Cortez and admitted for cardioversion for AF with RVR. She feels better today but has a low HR off her medicines. She has had to increase metoprolol and flecainide over the last few weeks. She does report some lightheadedness after her AV node blockers were increased. She is otherwise healthy without any significant issues. The patient otherwise denies chest pain, dyspnea, presyncope, syncope or lateralizing symptoms. Cardiac PMH: (Old records have been reviewed and summarized below)  prior thrombotic occlusion of the LAD. No obstructive disease at cath 2002  Aug 2013: Cath no obstructive CAD  Apr 2016 - EF 60-65%. pseudonormal diastolic function, RVSP 43  Jun 2017 - Lexiscan MPI - normal EKG, perfusion and LVEF  Jun 2017 - 24 hour  Monitor - NSR, occasional PAC, 2-3 episodes brief atrial tachycardia, longest 14 beats, asymptomatic.  2 episodes AIVR during sleep, rate 64, longest 4 beats. No diary entries  Oct 2021- afib  Oct 2021- Echo - EF 50-55%, mild to mod MR, bi atrial enlargement, mild RVE, RVSP 44-no significant change from previous  10/28/21 - successful DCCV but with early return to AF     EKG:  (EKG has been independently visualized by me with interpretation below): Atrial fibrillation, normal axis, no ischemia. ECHO: 10/18/2021  · LV: Estimated LVEF is 50 - 55%. Normal cavity size and wall thickness. Low normal systolic function. · RV: Mildly dilated right ventricle. Moderately reduced systolic function. · TV: Mild to moderate tricuspid valve regurgitation is present. Right Ventricular Arterial Pressure (RVSP) is 44 mmHg. Pulmonary hypertension found to be moderate. · MV: Mitral valve thickening.  Mild to moderate mitral valve regurgitation is present. · IVC: Moderately elevated central venous pressure (8 mmHg); IVC diameter is larger than 21 mm and collapses more than 50% with respiration. · LA: Moderately dilated left atrium. Left Atrium volume index is 43 mL/m2. · RA: Moderately dilated right atrium. · IAS: No atrial septal defect present. Previous Heart Catheterization: n/a     Stress Test: 6/2017  Left Ventricular Size: normal.   Transient Ischemic Dilatation: not present. Gated cine images demonstrates reveals normal myocardial thickening and   wall motion. Ejection Fraction: 70%     CONCLUSION:   1. Stress EKG: Normal.   2. SPECT Perfusion Imaging: Normal Perfusion. 3. LV Systolic Function is  normal.   4. Risk Assessment:  Normal.     DEVICE INTERROGATION: n/a     Past Medical History, Past Surgical History, Family history, Social History, and Medications were all reviewed with the patient today and updated as necessary.      Current Facility-Administered Medications   Medication Dose Route Frequency Provider Last Rate Last Admin    dronedarone (MULTAQ) tablet tab 400 mg  400 mg Oral BID WITH MEALS Marcia Avila MD        acetaminophen (TYLENOL) tablet 650 mg  650 mg Oral Q6H PRN [place] Mchugh, NP   650 mg at 11/12/21 2130    methenamine hippurate (HIPREX) tablet 1 g (Patient Supplied)  1 g Oral BID WITH MEALS Jeremy Mchugh, NP        [Held by provider] losartan (COZAAR) tablet 100 mg  100 mg Oral DAILY [place] Jeison, NP        apixaban (ELIQUIS) tablet 5 mg  5 mg Oral Q12H Sherie ARAUJO NP   5 mg at 11/13/21 0841    latanoprost (XALATAN) 0.005 % ophthalmic solution 1 Drop  1 Drop Both Eyes QPM Sherie ARAUJO NP   1 Drop at 11/12/21 2130    meloxicam (MOBIC) tablet 7.5 mg  7.5 mg Oral DAILY PRN [place]antha Dacostao, NP   7.5 mg at 11/12/21 1511    [Held by provider] triamterene-hydroCHLOROthiazide (MAXZIDE) 37.5-25 mg per tablet 1 Tablet  1 Tablet Oral DAILY [place] Mchugh, NP        pantoprazole (PROTONIX) tablet 40 mg  40 mg Oral ACB Kenzie Perez D, NP   40 mg at 11/13/21 3119    sertraline (ZOLOFT) tablet 50 mg  50 mg Oral DAILY Sheryl Alarcon, NP   50 mg at 11/13/21 0840    oxybutynin chloride XL (DITROPAN XL) tablet 10 mg  10 mg Oral BID Sheryl Valenzuelas, NP   10 mg at 11/13/21 0840    isosorbide mononitrate ER (IMDUR) tablet 30 mg  30 mg Oral DAILY Sheryl Valenzuelas, NP   30 mg at 11/13/21 0841    nitroglycerin (NITROSTAT) tablet 0.4 mg  0.4 mg SubLINGual PRN Pajesus alberto Tuckerjyotsnas, NP        sodium chloride (NS) flush 5-40 mL  5-40 mL IntraVENous Q8H Kenzie ARAUJO, NP   10 mL at 11/13/21 9324    sodium chloride (NS) flush 5-40 mL  5-40 mL IntraVENous PRN Sheryl Valenzuelas, NP        morphine injection 2 mg  2 mg IntraVENous Q4H PRN Pajesus alberto Tuckernela, NP        influenza vaccine 2021-22 (6 mos+)(PF) (FLUARIX/FLULAVAL/FLUZONE QUAD) injection 0.5 mL  1 Each IntraMUSCular PRIOR TO DISCHARGE Jose De Jesus Gomez MD        meloxicam SHAUN RIVAS [facility]) tablet 15 mg  15 mg Oral DAILY Jose De Jesus Gomez MD   15 mg at 11/13/21 9346     Allergies   Allergen Reactions    Adhesive Hives    Amoxicillin Swelling and Anaphylaxis     Other reaction(s): Lips/Mouth swelling-A    Iodine Rash     Other reaction(s): Hives/Swelling-A    Iodine And Iodide Containing Products Rash    Mucinex [Guaifenesin] Swelling     Lips swelling    Penicillin Rash    Shellfish Containing Products Hives    Shellfish Derived Anaphylaxis    Adhesive Tape-Silicones Other (comments) and Rash     Blisters; PAPER TAPE OK  blisters    Albuterol Shortness of Breath     \"coughing spasms and difficulty breathing\"    Bactrim [Sulfamethoprim Ss] Unknown (comments)     Leg cramps    Ciprofloxacin Nausea Only, Myalgia and Other (comments)     Other reaction(s): Joint soreness-I    Fish Oil [Docosahexaenoic Acid-Epa] Rash    Nsaids (Non-Steroidal Anti-Inflammatory Drug) Other (comments)     Bowel obstruction    Omega 3 [Omega-3 Fatty Acids] Hives    Statins-Hmg-Coa Reductase Inhibitors Other (comments)     Leg cramps    Sulfamethoxazole-Trimethoprim Myalgia    Tree Nut Hives    Vit E-Nonoxynol 9-Aloe Vera Hives     Patient Active Problem List    Diagnosis    Bradycardia    Paroxysmal atrial fibrillation [facility])     Oct 2021- cardioversion and AAD, anticoagulated      Gram-positive cocci bacteremia    Hypokalemia    Community acquired bacterial pneumonia    Sepsis ([facility]    Acute renal failure (ARF) ([facility]    Right middle lobe pneumonia    Mild depression ([facility]    Palpitations     Jun 2017 - 24 hour  Monitor - NSR, occasional PAC, 2-3 episodes brief atrial tachycardia, longest 14 beats, asymptomatic.  2 episodes AIVR during sleep, rate 64, longest 4 beats. No diary entries      Coronary atherosclerosis of native coronary vessel    CAD (coronary artery disease)     prior thrombotic occlusion of the LAD. No obstructive disease at cath 2002  Aug 2013: Cath no obstructive CAD  Apr 2016 - EF 60-65%. pseudonormal diastolic function, RVSP 43  Jun 2017 - Lexiscan MPI - normal EKG, perfusion and LVEF  Oct 2021- Echo - EF 50-55%, mild to mod MR, bi atrial enlargement, mild RVE, RVSP 44      Dyslipidemia    HTN (hypertension)    GERD (gastroesophageal reflux disease)    Chest pain       Past Medical History:   Diagnosis Date    Arthritis          CAD (coronary artery disease)     clot in coronary artery in 2001 and it was dissolved with no further probelms.     Chronic pain     arthritis in knees    Dyslipidemia     GERD (gastroesophageal reflux disease)     controlled with med    Glaucoma     History of basal cell cancer     multiple    History of peptic ulcer 2002 and 2014    HTN (hypertension)     controlled with med    Hx of colonic polyps          Macular edema     Followed by Dr. Jamir Mahan, [facility]    PUD (peptic ulcer disease)     Staph infection 2018    s/p left great toenail removal     Past Surgical History:   Procedure Laterality Date    HX BLADDER SUSPENSION  2006    HX CATARACT REMOVAL Right 2007    with IOL    HX CATARACT REMOVAL Left 2012    with IOL    HX COLONOSCOPY      multiple    HX COLONOSCOPY  09/23/2014    HX ENDOSCOPY      multiple    HX HEART CATHETERIZATION  2001, 2002--2013    no intervention-normal    HX HYSTERECTOMY  1990    HX KNEE ARTHROSCOPY Left 1995    knee scope    HX KNEE ARTHROSCOPY Right 1996    knee scope    HX KNEE ARTHROSCOPY Right 2007    knee scope    HX LAP CHOLECYSTECTOMY  1999    and ERCP    HX LUMBAR LAMINECTOMY  1978 and 1979    back surg--x 2      HX TUBAL LIGATION  1997     Family History   Problem Relation Age of Onset    Lung Disease Father     Colon Cancer Father     Cancer Father         colon    Crohn's Disease Mother     Alzheimer's Disease Sister     Malignant Hyperthermia Neg Hx     Pseudocholinesterase Deficiency Neg Hx     Delayed Awakening Neg Hx     Post-op Nausea/Vomiting Neg Hx     Emergence Delirium Neg Hx     Post-op Cognitive Dysfunction Neg Hx     Other Neg Hx      Social History     Tobacco Use    Smoking status: Never Smoker    Smokeless tobacco: Never Used   Substance Use Topics    Alcohol use: Yes     Alcohol/week: 2.5 standard drinks     Types: 3 Glasses of wine per week       ROS:  A comprehensive review of systems was performed with the pertinent positives and negatives as noted in the HPI in addition to:    Review of Systems   Constitutional: Positive for malaise/fatigue. HENT: Negative. Eyes: Negative. Respiratory: Negative. Cardiovascular: Positive for palpitations. Gastrointestinal: Negative. Genitourinary: Negative. Musculoskeletal: Negative. Skin: Negative. Neurological: Positive for dizziness. Endo/Heme/Allergies: Negative. Psychiatric/Behavioral: Negative.           PHYSICAL EXAM:     Visit Vitals  /67 (BP 1 Location: Left upper arm, BP Patient Position: At rest)   Pulse (!) 50   Temp 97.9 °F (36.6 °C)   Resp 18   Wt 177 lb 3.2 oz (80.4 kg)   SpO2 95%   Breastfeeding No   BMI 28.60 kg/m²        Wt Readings from Last 3 Encounters:   11/13/21 177 lb 3.2 oz (80.4 kg)   10/28/21 175 lb (79.4 kg)   10/25/21 175 lb (79.4 kg)     BP Readings from Last 3 Encounters:   11/13/21 117/67   10/28/21 125/72   10/25/21 138/78       Gen: Well appearing, well developed, no acute distress  Eyes: Pupils equal, round. Extraocular movements are intact  ENT: Oropharynx clear, no oral lesions, normal dentition  CV: S1S2, regular rate and rhythm, no murmurs, rubs or gallops, normal JVD, no carotid bruits, normal distal pulses, no CINDY  Pulm: Clear to auscultation bilaterally, no accessory muscle uses, no wheezes or rales  GI: Soft, NT, ND, +BS  Neuro: Alert and oriented, nonfocal  Psych: Appropriate affect  Skin: Normal color and skin turgor  MSK: Normal muscle bulk and tone    Medical problems and test results were reviewed with the patient today.      Results for orders placed or performed during the hospital encounter of 84/42/65   METABOLIC PANEL, BASIC   Result Value Ref Range    Sodium 141 136 - 145 mmol/L    Potassium 4.2 3.5 - 5.1 mmol/L    Chloride 108 (H) 98 - 107 mmol/L    CO2 28 21 - 32 mmol/L    Anion gap 5 (L) 7 - 16 mmol/L    Glucose 98 65 - 100 mg/dL    BUN 24 (H) 8 - 23 MG/DL    Creatinine 0.74 0.6 - 1.0 MG/DL    GFR est AA >60 >60 ml/min/1.73m2    GFR est non-AA >60 >60 ml/min/1.73m2    Calcium 9.0 8.3 - 10.4 MG/DL   CBC W/O DIFF   Result Value Ref Range    WBC 8.3 4.3 - 11.1 K/uL    RBC 4.35 4.05 - 5.2 M/uL    HGB 12.7 11.7 - 15.4 g/dL    HCT 42.4 35.8 - 46.3 %    MCV 97.5 79.6 - 97.8 FL    MCH 29.2 26.1 - 32.9 PG    MCHC 30.0 (L) 31.4 - 35.0 g/dL    RDW 13.6 11.9 - 14.6 %    PLATELET 812 743 - 610 K/uL    MPV 10.4 9.4 - 12.3 FL    ABSOLUTE NRBC 0.00 0.0 - 0.2 K/uL   MAGNESIUM   Result Value Ref Range    Magnesium 2.3 1.8 - 2.4 mg/dL   EKG, 12 LEAD, INITIAL   Result Value Ref Range    Ventricular Rate 62 BPM    Atrial Rate 49 BPM    QRS Duration 108 ms    Q-T Interval 472 ms    QTC Calculation (Bezet) 479 ms    Calculated R Axis 84 degrees    Calculated T Axis 40 degrees    Diagnosis       Atrial fibrillation  Abnormal ECG  When compared with ECG of 12-NOV-2021 08:40,  QRS duration has increased  ST no longer elevated in Anterolateral leads  T wave amplitude has decreased in Lateral leads  QT has lengthened  Confirmed by Lynette Dash ([id number]) on 11/12/2021 2:19:47 PM     CARDIOVERSION    Narrative    · Successful cardioversion with 1 attempt at 200 J of biphasic energy. Post procedure issues with sinus bradycardia (with heart rates around   35-40 and intermittent episodes of SVT. Also some mild hypotension). Patient will be monitored overnight. Toprol-XL and flecainide will be held   with consideration for alternative antiarrhythmics.           Lab Results   Component Value Date/Time    Potassium 4.2 11/13/2021 04:16 AM     Lab Results   Component Value Date/Time    Creatinine (POC) 0.6 05/15/2017 03:24 PM    Creatinine 0.74 11/13/2021 04:16 AM     Lab Results   Component Value Date/Time    HGB 12.7 11/13/2021 04:16 AM     Lab Results   Component Value Date/Time    INR 1.0 07/25/2013 10:30 AM    Prothrombin time 10.8 07/25/2013 10:30 AM

## 2021-11-30 ENCOUNTER — HOSPITAL ENCOUNTER (OUTPATIENT)
Dept: LAB | Age: 81
Discharge: HOME | End: 2021-11-30
Payer: MEDICARE

## 2021-11-30 DIAGNOSIS — R00.1 SYMPTOMATIC BRADYCARDIA: ICD-10-CM

## 2021-11-30 LAB
ANION GAP SERPL CALC-SCNC: 3 MMOL/L (ref 7–16)
BASOPHILS # BLD: 0.1 K/UL (ref 0–0.2)
BASOPHILS NFR BLD: 1 % (ref 0–2)
BUN SERPL-MCNC: 12 MG/DL (ref 8–23)
CALCIUM SERPL-MCNC: 9.5 MG/DL (ref 8.3–10.4)
CHLORIDE SERPL-SCNC: 109 MMOL/L (ref 98–107)
CO2 SERPL-SCNC: 31 MMOL/L (ref 21–32)
CREAT SERPL-MCNC: 0.55 MG/DL (ref 0.6–1)
DIFFERENTIAL METHOD BLD: ABNORMAL
EOSINOPHIL # BLD: 0.1 K/UL (ref 0–0.8)
EOSINOPHIL NFR BLD: 2 % (ref 0.5–7.8)
ERYTHROCYTE [DISTWIDTH] IN BLOOD BY AUTOMATED COUNT: 13.4 % (ref 11.9–14.6)
GLUCOSE SERPL-MCNC: 98 MG/DL (ref 65–100)
HCT VFR BLD AUTO: 41.2 % (ref 35.8–46.3)
HGB BLD-MCNC: 12.6 G/DL (ref 11.7–15.4)
IMM GRANULOCYTES # BLD AUTO: 0 K/UL (ref 0–0.5)
IMM GRANULOCYTES NFR BLD AUTO: 0 % (ref 0–5)
INR PPP: 1.3
LYMPHOCYTES # BLD: 1.1 K/UL (ref 0.5–4.6)
LYMPHOCYTES NFR BLD: 15 % (ref 13–44)
MCH RBC QN AUTO: 28.2 PG (ref 26.1–32.9)
MCHC RBC AUTO-ENTMCNC: 30.6 G/DL (ref 31.4–35)
MCV RBC AUTO: 92.2 FL (ref 79.6–97.8)
MONOCYTES # BLD: 0.7 K/UL (ref 0.1–1.3)
MONOCYTES NFR BLD: 10 % (ref 4–12)
NEUTS SEG # BLD: 5.4 K/UL (ref 1.7–8.2)
NEUTS SEG NFR BLD: 72 % (ref 43–78)
NRBC # BLD: 0 K/UL (ref 0–0.2)
PLATELET # BLD AUTO: 299 K/UL (ref 150–450)
PMV BLD AUTO: 10 FL (ref 9.4–12.3)
POTASSIUM SERPL-SCNC: 4.1 MMOL/L (ref 3.5–5.1)
PROTHROMBIN TIME: 16.2 SEC (ref 12.6–14.5)
RBC # BLD AUTO: 4.47 M/UL (ref 4.05–5.2)
SODIUM SERPL-SCNC: 143 MMOL/L (ref 136–145)
WBC # BLD AUTO: 7.5 K/UL (ref 4.3–11.1)

## 2021-11-30 PROCEDURE — 36415 COLL VENOUS BLD VENIPUNCTURE: CPT

## 2021-11-30 PROCEDURE — 85610 PROTHROMBIN TIME: CPT

## 2021-11-30 PROCEDURE — 80048 BASIC METABOLIC PNL TOTAL CA: CPT

## 2021-11-30 PROCEDURE — 85025 COMPLETE CBC W/AUTO DIFF WBC: CPT

## 2021-12-02 ENCOUNTER — ANESTHESIA EVENT (OUTPATIENT)
Dept: CARDIAC CATH/INVASIVE PROCEDURES | Age: 81
End: 2021-12-02
Payer: MEDICARE

## 2021-12-02 RX ORDER — SODIUM CHLORIDE, SODIUM LACTATE, POTASSIUM CHLORIDE, CALCIUM CHLORIDE 600; 310; 30; 20 MG/100ML; MG/100ML; MG/100ML; MG/100ML
100 INJECTION, SOLUTION INTRAVENOUS CONTINUOUS
Status: CANCELLED | OUTPATIENT
Start: 2021-12-02

## 2021-12-02 RX ORDER — OXYCODONE HYDROCHLORIDE 5 MG/1
5 TABLET ORAL
Status: CANCELLED | OUTPATIENT
Start: 2021-12-02 | End: 2021-12-03

## 2021-12-02 RX ORDER — NALOXONE HYDROCHLORIDE 0.4 MG/ML
0.04 INJECTION, SOLUTION INTRAMUSCULAR; INTRAVENOUS; SUBCUTANEOUS
Status: CANCELLED | OUTPATIENT
Start: 2021-12-02

## 2021-12-02 RX ORDER — HYDROMORPHONE HYDROCHLORIDE 2 MG/ML
0.2 INJECTION, SOLUTION INTRAMUSCULAR; INTRAVENOUS; SUBCUTANEOUS
Status: CANCELLED | OUTPATIENT
Start: 2021-12-02

## 2021-12-02 NOTE — PROGRESS NOTES
Called to pre-assess for Pacemaker with Dr Ela Marshall , Scheduled 12/3/21 at . No answer & message left.

## 2021-12-03 ENCOUNTER — APPOINTMENT (OUTPATIENT)
Dept: GENERAL RADIOLOGY | Age: 81
End: 2021-12-03
Attending: INTERNAL MEDICINE
Payer: MEDICARE

## 2021-12-03 ENCOUNTER — ANESTHESIA (OUTPATIENT)
Dept: CARDIAC CATH/INVASIVE PROCEDURES | Age: 81
End: 2021-12-03
Payer: MEDICARE

## 2021-12-03 ENCOUNTER — HOSPITAL ENCOUNTER (OUTPATIENT)
Age: 81
Setting detail: OBSERVATION
Discharge: HOME | End: 2021-12-04
Attending: INTERNAL MEDICINE | Admitting: INTERNAL MEDICINE
Payer: MEDICARE

## 2021-12-03 DIAGNOSIS — Z95.0 PACEMAKER: ICD-10-CM

## 2021-12-03 DIAGNOSIS — I10 ESSENTIAL HYPERTENSION: ICD-10-CM

## 2021-12-03 DIAGNOSIS — R00.1 SYMPTOMATIC BRADYCARDIA: ICD-10-CM

## 2021-12-03 DIAGNOSIS — I25.2 MI, OLD: ICD-10-CM

## 2021-12-03 DIAGNOSIS — K27.9 PUD (PEPTIC ULCER DISEASE): ICD-10-CM

## 2021-12-03 PROBLEM — I49.5 SICK SINUS SYNDROME (HCC): Status: ACTIVE | Noted: 2017-06-26

## 2021-12-03 LAB
ATRIAL RATE: 61 BPM
CALCULATED P AXIS, ECG09: 75 DEGREES
CALCULATED R AXIS, ECG10: 54 DEGREES
CALCULATED T AXIS, ECG11: 27 DEGREES
DIAGNOSIS, 93000: NORMAL
P-R INTERVAL, ECG05: 198 MS
Q-T INTERVAL, ECG07: 448 MS
QRS DURATION, ECG06: 88 MS
QTC CALCULATION (BEZET), ECG08: 450 MS
VENTRICULAR RATE, ECG03: 61 BPM

## 2021-12-03 PROCEDURE — 74011250636 HC RX REV CODE- 250/636: Performed by: INTERNAL MEDICINE

## 2021-12-03 PROCEDURE — 74011000250 HC RX REV CODE- 250: Performed by: NURSE ANESTHETIST, CERTIFIED REGISTERED

## 2021-12-03 PROCEDURE — 71045 X-RAY EXAM CHEST 1 VIEW: CPT

## 2021-12-03 PROCEDURE — 77030013687 HC GD NDL BARD -B: Performed by: INTERNAL MEDICINE

## 2021-12-03 PROCEDURE — 74011250637 HC RX REV CODE- 250/637: Performed by: INTERNAL MEDICINE

## 2021-12-03 PROCEDURE — C1894 INTRO/SHEATH, NON-LASER: HCPCS | Performed by: INTERNAL MEDICINE

## 2021-12-03 PROCEDURE — G0378 HOSPITAL OBSERVATION PER HR: HCPCS

## 2021-12-03 PROCEDURE — 93005 ELECTROCARDIOGRAM TRACING: CPT | Performed by: INTERNAL MEDICINE

## 2021-12-03 PROCEDURE — 33208 INSRT HEART PM ATRIAL & VENT: CPT | Performed by: INTERNAL MEDICINE

## 2021-12-03 PROCEDURE — 77030033067 HC SUT PDO STRATFX SPIR J&J -B: Performed by: INTERNAL MEDICINE

## 2021-12-03 PROCEDURE — 76060000033 HC ANESTHESIA 1 TO 1.5 HR: Performed by: INTERNAL MEDICINE

## 2021-12-03 PROCEDURE — 74011250636 HC RX REV CODE- 250/636: Performed by: ANESTHESIOLOGY

## 2021-12-03 PROCEDURE — 77030041279 HC DRSG PRMSL AG MDII -B: Performed by: INTERNAL MEDICINE

## 2021-12-03 PROCEDURE — 74011000636 HC RX REV CODE- 636: Performed by: INTERNAL MEDICINE

## 2021-12-03 PROCEDURE — 77030022704 HC SUT VLOC COVD -B: Performed by: INTERNAL MEDICINE

## 2021-12-03 PROCEDURE — 74011000272 HC RX REV CODE- 272: Performed by: INTERNAL MEDICINE

## 2021-12-03 PROCEDURE — 2709999900 HC NON-CHARGEABLE SUPPLY: Performed by: INTERNAL MEDICINE

## 2021-12-03 PROCEDURE — 74011250636 HC RX REV CODE- 250/636: Performed by: NURSE ANESTHETIST, CERTIFIED REGISTERED

## 2021-12-03 PROCEDURE — 77030038269 HC DRN EXT URIN PURWCK BARD -A

## 2021-12-03 PROCEDURE — 77030013504 HC DEV ELECSURG MEDT -F: Performed by: INTERNAL MEDICINE

## 2021-12-03 PROCEDURE — 77030018547 HC SUT ETHBND1 J&J -B: Performed by: INTERNAL MEDICINE

## 2021-12-03 PROCEDURE — 77030010507 HC ADH SKN DERMBND J&J -B: Performed by: INTERNAL MEDICINE

## 2021-12-03 PROCEDURE — C1898 LEAD, PMKR, OTHER THAN TRANS: HCPCS | Performed by: INTERNAL MEDICINE

## 2021-12-03 PROCEDURE — C1785 PMKR, DUAL, RATE-RESP: HCPCS | Performed by: INTERNAL MEDICINE

## 2021-12-03 PROCEDURE — 74011250637 HC RX REV CODE- 250/637: Performed by: NURSE PRACTITIONER

## 2021-12-03 PROCEDURE — 74011000250 HC RX REV CODE- 250: Performed by: INTERNAL MEDICINE

## 2021-12-03 PROCEDURE — 2709999900 HC NON-CHARGEABLE SUPPLY

## 2021-12-03 PROCEDURE — 94760 N-INVAS EAR/PLS OXIMETRY 1: CPT

## 2021-12-03 PROCEDURE — C1892 INTRO/SHEATH,FIXED,PEEL-AWAY: HCPCS | Performed by: INTERNAL MEDICINE

## 2021-12-03 DEVICE — LEAD 5076-52 MRI US RCMCRD
Type: IMPLANTABLE DEVICE | Status: FUNCTIONAL
Brand: CAPSUREFIX NOVUS MRI™ SURESCAN®

## 2021-12-03 DEVICE — IPG W1DR01 AZURE XT DR MRI USA
Type: IMPLANTABLE DEVICE | Status: FUNCTIONAL
Brand: AZURE™ XT DR MRI SURESCAN™

## 2021-12-03 DEVICE — LEAD 5076-58 MRI US RCMCRD
Type: IMPLANTABLE DEVICE | Status: FUNCTIONAL
Brand: CAPSUREFIX NOVUS MRI™ SURESCAN®

## 2021-12-03 RX ORDER — LIDOCAINE HYDROCHLORIDE 20 MG/ML
INJECTION, SOLUTION EPIDURAL; INFILTRATION; INTRACAUDAL; PERINEURAL AS NEEDED
Status: DISCONTINUED | OUTPATIENT
Start: 2021-12-03 | End: 2021-12-03 | Stop reason: HOSPADM

## 2021-12-03 RX ORDER — PANTOPRAZOLE SODIUM 40 MG/1
40 TABLET, DELAYED RELEASE ORAL DAILY
Status: DISCONTINUED | OUTPATIENT
Start: 2021-12-04 | End: 2021-12-04 | Stop reason: HOSPADM

## 2021-12-03 RX ORDER — OXYBUTYNIN CHLORIDE 10 MG/1
10 TABLET, EXTENDED RELEASE ORAL 2 TIMES DAILY
Status: DISCONTINUED | OUTPATIENT
Start: 2021-12-03 | End: 2021-12-04 | Stop reason: HOSPADM

## 2021-12-03 RX ORDER — ONDANSETRON 2 MG/ML
4 INJECTION INTRAMUSCULAR; INTRAVENOUS
Status: DISCONTINUED | OUTPATIENT
Start: 2021-12-03 | End: 2021-12-04 | Stop reason: HOSPADM

## 2021-12-03 RX ORDER — HYDROCORTISONE SODIUM SUCCINATE 100 MG/2ML
100 INJECTION, POWDER, FOR SOLUTION INTRAMUSCULAR; INTRAVENOUS ONCE
Status: COMPLETED | OUTPATIENT
Start: 2021-12-03 | End: 2021-12-03

## 2021-12-03 RX ORDER — HYDROCODONE BITARTRATE AND ACETAMINOPHEN 5; 325 MG/1; MG/1
1 TABLET ORAL
Status: DISCONTINUED | OUTPATIENT
Start: 2021-12-03 | End: 2021-12-04 | Stop reason: HOSPADM

## 2021-12-03 RX ORDER — ACETAMINOPHEN 325 MG/1
650 TABLET ORAL
Status: DISCONTINUED | OUTPATIENT
Start: 2021-12-03 | End: 2021-12-04 | Stop reason: HOSPADM

## 2021-12-03 RX ORDER — CLINDAMYCIN PHOSPHATE 900 MG/50ML
900 INJECTION INTRAVENOUS ONCE
Status: COMPLETED | OUTPATIENT
Start: 2021-12-03 | End: 2021-12-03

## 2021-12-03 RX ORDER — METHENAMINE HIPPURATE 1000 MG/1
1 TABLET ORAL 2 TIMES DAILY
Refills: 3 | Status: DISCONTINUED | OUTPATIENT
Start: 2021-12-03 | End: 2021-12-04 | Stop reason: HOSPADM

## 2021-12-03 RX ORDER — LIDOCAINE HYDROCHLORIDE 10 MG/ML
0.1 INJECTION INFILTRATION; PERINEURAL AS NEEDED
Status: DISCONTINUED | OUTPATIENT
Start: 2021-12-03 | End: 2021-12-04 | Stop reason: HOSPADM

## 2021-12-03 RX ORDER — FAMOTIDINE 10 MG/ML
20 INJECTION INTRAVENOUS ONCE
Status: COMPLETED | OUTPATIENT
Start: 2021-12-03 | End: 2021-12-03

## 2021-12-03 RX ORDER — PROPOFOL 10 MG/ML
INJECTION, EMULSION INTRAVENOUS
Status: DISCONTINUED | OUTPATIENT
Start: 2021-12-03 | End: 2021-12-03 | Stop reason: HOSPADM

## 2021-12-03 RX ORDER — LOSARTAN POTASSIUM 50 MG/1
TABLET ORAL DAILY
COMMUNITY
End: 2022-01-25 | Stop reason: DRUGHIGH

## 2021-12-03 RX ORDER — SODIUM CHLORIDE, SODIUM LACTATE, POTASSIUM CHLORIDE, CALCIUM CHLORIDE 600; 310; 30; 20 MG/100ML; MG/100ML; MG/100ML; MG/100ML
100 INJECTION, SOLUTION INTRAVENOUS CONTINUOUS
Status: DISCONTINUED | OUTPATIENT
Start: 2021-12-03 | End: 2021-12-03

## 2021-12-03 RX ORDER — DICYCLOMINE HYDROCHLORIDE 10 MG/1
10 CAPSULE ORAL
Status: DISCONTINUED | OUTPATIENT
Start: 2021-12-03 | End: 2021-12-04 | Stop reason: HOSPADM

## 2021-12-03 RX ORDER — SODIUM CHLORIDE 0.9 % (FLUSH) 0.9 %
5-40 SYRINGE (ML) INJECTION AS NEEDED
Status: DISCONTINUED | OUTPATIENT
Start: 2021-12-03 | End: 2021-12-04 | Stop reason: HOSPADM

## 2021-12-03 RX ORDER — LOSARTAN POTASSIUM 50 MG/1
50 TABLET ORAL DAILY
Status: DISCONTINUED | OUTPATIENT
Start: 2021-12-03 | End: 2021-12-04 | Stop reason: HOSPADM

## 2021-12-03 RX ORDER — LATANOPROST 50 UG/ML
1 SOLUTION/ DROPS OPHTHALMIC
Status: DISCONTINUED | OUTPATIENT
Start: 2021-12-03 | End: 2021-12-04 | Stop reason: HOSPADM

## 2021-12-03 RX ORDER — DOCUSATE SODIUM 100 MG/1
100 CAPSULE, LIQUID FILLED ORAL
Status: DISCONTINUED | OUTPATIENT
Start: 2021-12-03 | End: 2021-12-04 | Stop reason: HOSPADM

## 2021-12-03 RX ORDER — DIPHENHYDRAMINE HYDROCHLORIDE 50 MG/ML
50 INJECTION, SOLUTION INTRAMUSCULAR; INTRAVENOUS ONCE
Status: COMPLETED | OUTPATIENT
Start: 2021-12-03 | End: 2021-12-03

## 2021-12-03 RX ORDER — ACETAMINOPHEN 325 MG/1
650 TABLET ORAL ONCE
Status: COMPLETED | OUTPATIENT
Start: 2021-12-03 | End: 2021-12-03

## 2021-12-03 RX ORDER — FENTANYL CITRATE 50 UG/ML
100 INJECTION, SOLUTION INTRAMUSCULAR; INTRAVENOUS ONCE
Status: ACTIVE | OUTPATIENT
Start: 2021-12-03 | End: 2021-12-04

## 2021-12-03 RX ORDER — PROPOFOL 10 MG/ML
INJECTION, EMULSION INTRAVENOUS AS NEEDED
Status: DISCONTINUED | OUTPATIENT
Start: 2021-12-03 | End: 2021-12-03 | Stop reason: HOSPADM

## 2021-12-03 RX ORDER — FENTANYL CITRATE 50 UG/ML
25-50 INJECTION, SOLUTION INTRAMUSCULAR; INTRAVENOUS
Status: DISCONTINUED | OUTPATIENT
Start: 2021-12-03 | End: 2021-12-03

## 2021-12-03 RX ORDER — ISOSORBIDE MONONITRATE 30 MG/1
30 TABLET, EXTENDED RELEASE ORAL DAILY
Status: DISCONTINUED | OUTPATIENT
Start: 2021-12-04 | End: 2021-12-04 | Stop reason: HOSPADM

## 2021-12-03 RX ORDER — METOPROLOL TARTRATE 50 MG/1
100 TABLET ORAL DAILY
Status: DISCONTINUED | OUTPATIENT
Start: 2021-12-03 | End: 2021-12-04 | Stop reason: HOSPADM

## 2021-12-03 RX ORDER — SODIUM CHLORIDE 0.9 % (FLUSH) 0.9 %
5-40 SYRINGE (ML) INJECTION EVERY 8 HOURS
Status: DISCONTINUED | OUTPATIENT
Start: 2021-12-03 | End: 2021-12-04 | Stop reason: HOSPADM

## 2021-12-03 RX ORDER — METOPROLOL TARTRATE AND HYDROCHLOROTHIAZIDE 100; 25 MG/1; MG/1
1 TABLET ORAL DAILY
COMMUNITY
End: 2022-02-10

## 2021-12-03 RX ORDER — MIDAZOLAM HYDROCHLORIDE 1 MG/ML
2 INJECTION, SOLUTION INTRAMUSCULAR; INTRAVENOUS
Status: DISCONTINUED | OUTPATIENT
Start: 2021-12-03 | End: 2021-12-03

## 2021-12-03 RX ORDER — MIDAZOLAM HYDROCHLORIDE 1 MG/ML
2 INJECTION, SOLUTION INTRAMUSCULAR; INTRAVENOUS ONCE
Status: ACTIVE | OUTPATIENT
Start: 2021-12-03 | End: 2021-12-04

## 2021-12-03 RX ORDER — SERTRALINE HYDROCHLORIDE 50 MG/1
50 TABLET, FILM COATED ORAL DAILY
Status: DISCONTINUED | OUTPATIENT
Start: 2021-12-04 | End: 2021-12-04 | Stop reason: HOSPADM

## 2021-12-03 RX ADMIN — PHENYLEPHRINE HYDROCHLORIDE 100 MCG: 10 INJECTION INTRAVENOUS at 14:46

## 2021-12-03 RX ADMIN — PHENYLEPHRINE HYDROCHLORIDE 100 MCG: 10 INJECTION INTRAVENOUS at 15:10

## 2021-12-03 RX ADMIN — PROPOFOL 30 MG: 10 INJECTION, EMULSION INTRAVENOUS at 14:28

## 2021-12-03 RX ADMIN — PROPOFOL 20 MG: 10 INJECTION, EMULSION INTRAVENOUS at 14:30

## 2021-12-03 RX ADMIN — HYDROCODONE BITARTRATE AND ACETAMINOPHEN 1 TABLET: 5; 325 TABLET ORAL at 21:40

## 2021-12-03 RX ADMIN — PROPOFOL 140 MCG/KG/MIN: 10 INJECTION, EMULSION INTRAVENOUS at 14:28

## 2021-12-03 RX ADMIN — LOSARTAN POTASSIUM 50 MG: 50 TABLET, FILM COATED ORAL at 19:48

## 2021-12-03 RX ADMIN — ACETAMINOPHEN 650 MG: 325 TABLET ORAL at 16:11

## 2021-12-03 RX ADMIN — FAMOTIDINE 20 MG: 10 INJECTION INTRAVENOUS at 14:02

## 2021-12-03 RX ADMIN — METOPROLOL TARTRATE 100 MG: 50 TABLET, FILM COATED ORAL at 21:34

## 2021-12-03 RX ADMIN — Medication 10 ML: at 22:00

## 2021-12-03 RX ADMIN — CLINDAMYCIN PHOSPHATE 900 MG: 900 INJECTION, SOLUTION INTRAVENOUS at 14:29

## 2021-12-03 RX ADMIN — HYDROCORTISONE SODIUM SUCCINATE 100 MG: 100 INJECTION, POWDER, FOR SOLUTION INTRAMUSCULAR; INTRAVENOUS at 14:02

## 2021-12-03 RX ADMIN — PHENYLEPHRINE HYDROCHLORIDE 100 MCG: 10 INJECTION INTRAVENOUS at 15:20

## 2021-12-03 RX ADMIN — LIDOCAINE HYDROCHLORIDE 40 MG: 20 INJECTION, SOLUTION EPIDURAL; INFILTRATION; INTRACAUDAL; PERINEURAL at 14:28

## 2021-12-03 RX ADMIN — SODIUM CHLORIDE, SODIUM LACTATE, POTASSIUM CHLORIDE, AND CALCIUM CHLORIDE: 600; 310; 30; 20 INJECTION, SOLUTION INTRAVENOUS at 14:23

## 2021-12-03 RX ADMIN — PHENYLEPHRINE HYDROCHLORIDE 100 MCG: 10 INJECTION INTRAVENOUS at 15:00

## 2021-12-03 RX ADMIN — HYDROCODONE BITARTRATE AND ACETAMINOPHEN 1 TABLET: 5; 325 TABLET ORAL at 18:03

## 2021-12-03 RX ADMIN — DIPHENHYDRAMINE HYDROCHLORIDE 50 MG: 50 INJECTION INTRAMUSCULAR; INTRAVENOUS at 14:01

## 2021-12-03 RX ADMIN — OXYBUTYNIN CHLORIDE 10 MG: 10 TABLET, EXTENDED RELEASE ORAL at 19:48

## 2021-12-03 NOTE — ANESTHESIA POSTPROCEDURE EVALUATION
Procedure(s):  INSERT PPM DUAL. total IV anesthesia    Anesthesia Post Evaluation        Patient location during evaluation: PACU  Patient participation: complete - patient participated  Level of consciousness: awake  Pain management: satisfactory to patient  Airway patency: patent  Anesthetic complications: no  Cardiovascular status: hemodynamically stable  Respiratory status: spontaneous ventilation  Hydration status: euvolemic  Post anesthesia nausea and vomiting:  none      INITIAL Post-op Vital signs:   Vitals Value Taken Time   /78 12/03/21 1647   Temp 36.7 °C (98 °F) 12/03/21 1551   Pulse 66 12/03/21 1655   Resp 14 12/03/21 1551   SpO2 97 % 12/03/21 1655   Vitals shown include unvalidated device data.

## 2021-12-03 NOTE — PROCEDURES
: Kyle Suero MD    REFERRING: Breonna Correa MD    Pre-Procedure Diagnosis  1. Documented non-reversible symptomatic bradycardia due to sick sinus syndrome. Procedure Performed  1. Insertion of a Dual Chamber Pacemaker    Anesthesia: MAC     Estimated Blood Loss: Less than 10 mL     Specimens: * No specimens in log *     Complications: None     Fluoroscopy time: 4.1 minutes    Contrast: 15 cc (patient was pre-medicated for contrast/shellfish allergy)    Patient Information and Indications: The procedure, indications, risks, benefits, and alternatives were discussed with the patient and family members, who desired to proceed after questions were answered and informed consent was documented. Procedure: After informed consent was obtained, the patient was brought to the Electrophysiology Laboratory in a fasting state and was prepped and draped in sterile fashion. Prophylactic antibiotic was administered 10 minutes prior to skin incision: refer to anesthesia procedural documentation for full details. Conscious sedation was administered by anesthesia with continuous oxygen saturation measurement and blood pressure measurement. A venogram of the LUE demonstrated a patent left axillary and subclavian without obvious stenosis. Local anesthetic (sensorcaine) was delivered to the left pectoral region. A subcutaneous pocket was created using blunt dissection and electrocautery, and adequate hemostasis was established. Access x 2 (Micropuncture kit) was obtained in the left axillary vein under fluoroscopic guidance using a modified Seldinger technique. Next, placement of a 7 Fr peel-away sheath over the first guidewire was performed. A permanent RV lead was then advanced under fluoroscopic guidance via the 7 Fr sheath into the RV septum in a stable position with satisfactory sensing and pacing characteristics. The peel away sheath was removed.  A 7 Fr Safe-sheath was then placed in the second guidewire. A permanent pacing lead was advanced under fluoroscopic guidance and positioned in the right atrial appendage. Stable RA appendage position with satisfactory sensing and pacing characteristics was obtained. The sheath was peeled. The leads were sutured to the underlying pectoralis fascia using 2 non-absorbable sutures around each lead's collar. The lead slack and position was assessed under fluoroscopy while securing the lead collars to ensure proper length. Final lead testing via the pacing system analyzer (PSA) demonstrated stable sensing, impedance and pacing thresholds. The lead pins were then cleaned with antibiotic soaked gauze, dried gently, and attached to a new pacemaker generator. Pins were directly observed to pass the tip electrode, and the ring hex wrench screws were secured, and leads tug tested. The device and leads were gently positioned within the pocket after the pocket was irrigated copiously with a saline antimicrobial solution. The wound was closed with multiple layers of absorbable suture (3-0 Monocryl, Stratafix) followed by skin closure with 4-0 V-Loc. The patient tolerated the procedure well and there were no complications. All sharps and sponges were counted x 2. The patient was transferred to the recovery area in stable condition.     Device and Lead Information  Pulse Generator Model #  Serial # Location Implant        Jeri Edgar DR         [id number] [employer]  [id number]   Left pectoral 12-03-21   Lead Model Number  Serial Number Lead position Implant          RA [id number] MDT [id number] appendage 12-03-21         RV      9130-49 MDT [id number] RV [employer] 12-03-21     Lead Sensitivity and Threshold  Lead R or P sensitivity (mv) Threshold (V) Threshold PW (msec) Impedance (ohms) Final output Voltage (V) Final PW (msec)   RA 1.6 1.0 0.40 646 3.0 .4   RV 7.4 0.5 0.40 551 3.0 .4     Bradycardia Settings  Khadar Mode LRL URL Pace AVD (ms) Sense AVD (ms) Rate Response Mode Switching Mode SW Rate   AAIR ? DDDR 60 130 180 150 On On 171           Impression: 1. Successful implantation and testing of dual chamber pacemaker (MRI conditional). Plan:   -Overnight observation.  -Routine CIED instructions.  -Routine cardiology follow up with Dr. Kathie Cooper. -Device clinic follow up in 1-2 weeks. Lazaro Mcgowan.  Lorena De Leon MD, [address]  Clinical Cardiac Electrophysiology  [facility] Cardiology    12/3/2021  3:40 PM

## 2021-12-03 NOTE — PROGRESS NOTES
TRANSFER - OUT REPORT:    Verbal report given to [facility] RN(name) on Leah Screws  being transferred to [facility](unit) for routine progression of care       Report consisted of patients Situation, Background, Assessment and   Recommendations(SBAR). Information from the following report(s) Procedure Summary was reviewed with the receiving nurse. Lines:   Peripheral IV 12/03/21 Anterior; Proximal; Right Forearm (Active)       Peripheral IV 12/03/21 Anterior; Left Hand (Active)        Opportunity for questions and clarification was provided.       Patient transported with:   [employer]

## 2021-12-03 NOTE — PROGRESS NOTES
Patient received to [facility]  room # 9  Ambulatory from [facility]. Patient scheduled for PPM today with Dr Juan Carlos Lorenzana. Procedure reviewed & questions answered, voiced good understanding consent obtained & placed on chart. All medications and medical history reviewed. Will prep patient per orders. Patient & family updated on plan of care.       The patient has a fraility score of 4-VULNERABLE, based on age and abilities

## 2021-12-03 NOTE — PROGRESS NOTES
Report received from [address] [place] [address]. Procedural finding communicated. Intra procedural medication administration reviewed. Progression of care discussed. Patient received into CPRU room 6, Post PPM    Access site without bleeding or swelling. Left chest    Patient instructed to limit movement of left upper extremity. Routine post procedural vital signs & site assessment initiated.

## 2021-12-03 NOTE — PROGRESS NOTES
TRANSFER - IN REPORT:    Verbal report received from Baldemar Watkins, [facility] (name) on Lisa Luu  being received from Cardiac Cath Lab (unit) for routine progression of care      Report consisted of patients Situation, Background, Assessment and   Recommendations(SBAR). Information from the following report(s) SBAR was reviewed with the receiving nurse. Opportunity for questions and clarification was provided. Assessment completed upon patients arrival to unit and care assumed. Dual skin assessment performed with RN. No skin breakdown noted to sacrum or heels.

## 2021-12-03 NOTE — ANESTHESIA PREPROCEDURE EVALUATION
Anesthetic History   No history of anesthetic complications            Review of Systems / Medical History  Patient summary reviewed, nursing notes reviewed and pertinent labs reviewed    Pulmonary  Within defined limits                 Neuro/Psych   Within defined limits           Cardiovascular    Hypertension: well controlled        Dysrhythmias : atrial fibrillation  CAD    Exercise tolerance: >4 METS     GI/Hepatic/Renal     GERD: well controlled           Endo/Other        Arthritis     Other Findings              Physical Exam    Airway  Mallampati: II  TM Distance: 4 - 6 cm  Neck ROM: normal range of motion   Mouth opening: Normal     Cardiovascular    Rhythm: regular           Dental  No notable dental hx       Pulmonary  Breath sounds clear to auscultation               Abdominal         Other Findings            Anesthetic Plan    ASA: 3  Anesthesia type: total IV anesthesia          Induction: Intravenous  Anesthetic plan and risks discussed with: Patient

## 2021-12-04 VITALS
DIASTOLIC BLOOD PRESSURE: 79 MMHG | RESPIRATION RATE: 20 BRPM | WEIGHT: 172 LBS | TEMPERATURE: 97.6 F | BODY MASS INDEX: 27 KG/M2 | OXYGEN SATURATION: 98 % | SYSTOLIC BLOOD PRESSURE: 168 MMHG | HEART RATE: 64 BPM | HEIGHT: 67 IN

## 2021-12-04 LAB
ANION GAP SERPL CALC-SCNC: 6 MMOL/L (ref 7–16)
ATRIAL RATE: 52 BPM
BUN SERPL-MCNC: 11 MG/DL (ref 8–23)
CALCIUM SERPL-MCNC: 9.7 MG/DL (ref 8.3–10.4)
CALCULATED R AXIS, ECG10: 63 DEGREES
CALCULATED T AXIS, ECG11: 47 DEGREES
CHLORIDE SERPL-SCNC: 108 MMOL/L (ref 98–107)
CO2 SERPL-SCNC: 29 MMOL/L (ref 21–32)
CREAT SERPL-MCNC: 0.57 MG/DL (ref 0.6–1)
DIAGNOSIS, 93000: NORMAL
GLUCOSE BLD STRIP.AUTO-MCNC: 96 MG/DL (ref 65–100)
GLUCOSE SERPL-MCNC: 86 MG/DL (ref 65–100)
MAGNESIUM SERPL-MCNC: 2.1 MG/DL (ref 1.8–2.4)
P-R INTERVAL, ECG05: 228 MS
POTASSIUM SERPL-SCNC: 3.2 MMOL/L (ref 3.5–5.1)
Q-T INTERVAL, ECG07: 434 MS
QRS DURATION, ECG06: 90 MS
QTC CALCULATION (BEZET), ECG08: 434 MS
SERVICE CMNT-IMP: NORMAL
SODIUM SERPL-SCNC: 143 MMOL/L (ref 136–145)
VENTRICULAR RATE, ECG03: 60 BPM

## 2021-12-04 PROCEDURE — 74011250637 HC RX REV CODE- 250/637: Performed by: INTERNAL MEDICINE

## 2021-12-04 PROCEDURE — 74011250637 HC RX REV CODE- 250/637: Performed by: PHYSICIAN ASSISTANT

## 2021-12-04 PROCEDURE — 99024 POSTOP FOLLOW-UP VISIT: CPT | Performed by: INTERNAL MEDICINE

## 2021-12-04 PROCEDURE — 36415 COLL VENOUS BLD VENIPUNCTURE: CPT

## 2021-12-04 PROCEDURE — 80048 BASIC METABOLIC PNL TOTAL CA: CPT

## 2021-12-04 PROCEDURE — 82962 GLUCOSE BLOOD TEST: CPT

## 2021-12-04 PROCEDURE — 83735 ASSAY OF MAGNESIUM: CPT

## 2021-12-04 PROCEDURE — G0378 HOSPITAL OBSERVATION PER HR: HCPCS

## 2021-12-04 PROCEDURE — 74011250636 HC RX REV CODE- 250/636: Performed by: NURSE PRACTITIONER

## 2021-12-04 RX ORDER — HYDRALAZINE HYDROCHLORIDE 20 MG/ML
10 INJECTION INTRAMUSCULAR; INTRAVENOUS
Status: DISCONTINUED | OUTPATIENT
Start: 2021-12-04 | End: 2021-12-04 | Stop reason: HOSPADM

## 2021-12-04 RX ORDER — POTASSIUM CHLORIDE 20 MEQ/1
40 TABLET, EXTENDED RELEASE ORAL
Status: COMPLETED | OUTPATIENT
Start: 2021-12-04 | End: 2021-12-04

## 2021-12-04 RX ADMIN — OXYBUTYNIN CHLORIDE 10 MG: 10 TABLET, EXTENDED RELEASE ORAL at 11:10

## 2021-12-04 RX ADMIN — SERTRALINE 50 MG: 50 TABLET, FILM COATED ORAL at 11:10

## 2021-12-04 RX ADMIN — PANTOPRAZOLE SODIUM 40 MG: 40 TABLET, DELAYED RELEASE ORAL at 11:10

## 2021-12-04 RX ADMIN — Medication 10 ML: at 06:00

## 2021-12-04 RX ADMIN — POTASSIUM CHLORIDE 40 MEQ: 20 TABLET, EXTENDED RELEASE ORAL at 11:10

## 2021-12-04 RX ADMIN — ACETAMINOPHEN 650 MG: 325 TABLET ORAL at 11:09

## 2021-12-04 RX ADMIN — ISOSORBIDE MONONITRATE 30 MG: 30 TABLET, EXTENDED RELEASE ORAL at 11:10

## 2021-12-04 RX ADMIN — HYDRALAZINE HYDROCHLORIDE 10 MG: 20 INJECTION INTRAMUSCULAR; INTRAVENOUS at 03:22

## 2021-12-04 NOTE — DISCHARGE INSTRUCTIONS
CARDIAC DEVICE INSTRUCTION SHEET    1. You should have received a 1-2 weeks follow up appointment upon discharge from the hospital.  If you did not receive this appointment prior to leaving the hospital, please contact us at [phone]. 2.  Keep your incision dry for 14 days. DO NOT put ointments, creams or lotions on the incision for 2 weeks. 3.  Your dressing will be removed at your follow up appointment. If you have sutures/staples, the nurse will remove them at the follow up appointment. 4.  Call us IMMEDIATELY if you develop fever, pain, redness, or drainage at the incision site. 5.  You may use your pacemaker/ICD arm, but keep your arm lower than your shoulder for the first 8 weeks (or until cleared by a physician) to prevent the device lead(s) from moving. 6.  Do not lift more than 10 pounds for 4 weeks and 20 pounds for 8 weeks. 7.  Within 6 weeks you should receive your cardiac device ID card. Carry your card with you at all times. Always show it to any doctor or dentist you see. 8.    You will need to have your device evaluated every 3 months via office, remote, or telephone. 9.  Microwaves WILL NOT harm your device. Warnings do not apply to you. 10.  At airports, always show your cardiac device ID card. You may walk through metal detectors but do not allow the hand held wand near your device. 11.  DO NOT have an MRI without contacting your cardiologists office first.     12.  Please refer to the education booklet given to you at the hospital for the activities and equipment you should avoid. Some may reprogram or interfere with your cardiac device.        DISCHARGE SUMMARY from Nurse    PATIENT INSTRUCTIONS:    After general anesthesia or intravenous sedation, for 24 hours or while taking prescription Narcotics:  · Limit your activities  · Do not drive and operate hazardous machinery  · Do not make important personal or business decisions  · Do  not drink alcoholic beverages  · If you have not urinated within 8 hours after discharge, please contact your surgeon on call. Report the following to your surgeon:  · Excessive pain, swelling, redness or odor of or around the surgical area  · Temperature over 100.5  · Nausea and vomiting lasting longer than 4 hours or if unable to take medications  · Any signs of decreased circulation or nerve impairment to extremity: change in color, persistent  numbness, tingling, coldness or increase pain  · Any questions    What to do at Home:    *  Please give a list of your current medications to your Primary Care Provider. *  Please update this list whenever your medications are discontinued, doses are      changed, or new medications (including over-the-counter products) are added. *  Please carry medication information at all times in case of emergency situations. These are general instructions for a healthy lifestyle:    No smoking/ No tobacco products/ Avoid exposure to second hand smoke  Surgeon General's Warning:  Quitting smoking now greatly reduces serious risk to your health. Obesity, smoking, and sedentary lifestyle greatly increases your risk for illness    A healthy diet, regular physical exercise & weight monitoring are important for maintaining a healthy lifestyle    You may be retaining fluid if you have a history of heart failure or if you experience any of the following symptoms:  Weight gain of 3 pounds or more overnight or 5 pounds in a week, increased swelling in our hands or feet or shortness of breath while lying flat in bed. Please call your doctor as soon as you notice any of these symptoms; do not wait until your next office visit. The discharge information has been reviewed with the patient. The patient verbalized understanding.   Discharge medications reviewed with the patient and appropriate educational materials and side effects teaching were provided.   ___________________________________________________________________________________________________________________________________

## 2021-12-04 NOTE — PROGRESS NOTES
Perfect serve message sent to [address] Ros[address] MD:  pt takes Cozaar at home for BP, but is not currently taking during hospitalization. Currently trending in 355-585 systolic. Can we add something for blood pressure control? Order received for Cozaar.

## 2021-12-04 NOTE — PROGRESS NOTES
I have reviewed discharge instructions with the patient and spouse. The patient and spouse verbalized understanding. E sign pad froze during completion, signature may not have captured.

## 2021-12-04 NOTE — DISCHARGE SUMMARY
[facility] Discharge Summary     Patient ID:  Daniela Trujillo  [id number]  28 y.o.  1940    Admit date: 12/3/2021    Discharge date:  12/4/2021    Admitting Physician: Franny Kathleen MD     Discharge Physician: Dr. Sandra Wood    Admission Diagnoses: Symptomatic bradycardia [R00.1]  Pacemaker [Z95.0]    Discharge Diagnoses:    Diagnosis    Pacemaker    Essential hypertension    PUD (peptic ulcer disease)    Symptomatic bradycardia    Paroxysmal atrial fibrillation     Mild depression     Sick sinus syndrome     CAD (coronary artery disease)    Dyslipidemia    HTN (hypertension)    GERD (gastroesophageal reflux disease)       Cardiology Procedures this admission:  Device Implantation --  MDT DC PPM  Consults: None    Hospital Course: Patient was seen at the office of [facility] by Dr. Brennan Wright for complaints of symptomatic bradycardia and was subsequently scheduled for a [employer] DC PPM implantation at [facility] on 12/3/21. Patient was taken to the EP lab and underwent successful device implantation by Dr. Brennan Wright. Patient tolerated the procedure well and was taken to the telemetry floor for recovery. Follow up chest xray showed no pneumothorax. The following morning patient was up feeling well without any complaints of chest pain, shortness of breath, or palpitations. Patient's left subclavian site was clean, dry and intact without hematoma. Patient's labs were stable. Patient was seen and examined by Dr. Sandra Wood and determined stable and ready for discharge. Patient was instructed on the importance of medication compliance and outpatient follow up. The patient will follow up with [facility] for device check. DISPOSITION: Patient has been instructed to keep affected arm below shoulder level for the next 4 weeks or until cleared by doctor. The arm sling should be worn while sleeping. The dressing will be removed at follow up appointment.   The incision site must be kept clean and dry. The patient may shower in a few days. Lotions, powders, or creams should be avoided as these can increase the risk of infection. The affected arm should not be used for any pushing, pulling, or lifting until cleared by doctor. Driving is prohibited until cleared by doctor in a follow up appointment. Any signs of infection including increased redness, suspicious drainage, or unexplained fever should be reported to the doctor immediately by calling [phone]. Discharge Exam:   Visit Vitals  BP (!) 168/79   Pulse 64   Temp 97.6 °F (36.4 °C)   Resp 20   Ht 5' 7\" (1.702 m)   Wt 78 kg (172 lb)   SpO2 98%   Breastfeeding No   BMI 26.94 kg/m²       Pt has been seen by Dr. Michael Vazquez: see his progress note for exam details.     Recent Results (from the past 24 hour(s))   EKG, 12 LEAD, INITIAL    Collection Time: 12/03/21  2:03 PM   Result Value Ref Range    Ventricular Rate 61 BPM    Atrial Rate 61 BPM    P-R Interval 198 ms    QRS Duration 88 ms    Q-T Interval 448 ms    QTC Calculation (Bezet) 450 ms    Calculated P Axis 75 degrees    Calculated R Axis 54 degrees    Calculated T Axis 27 degrees    Diagnosis       Normal sinus rhythm  Normal ECG  When compared with ECG of 12-NOV-2021 08:43,  Sinus rhythm has replaced Atrial fibrillation  Confirmed by DEB MALCOLM ()Rosita ([id number]) on 12/3/2021 2:15:48 PM     EKG, 12 LEAD, INITIAL    Collection Time: 12/03/21  4:16 PM   Result Value Ref Range    Ventricular Rate 60 BPM    Atrial Rate 52 BPM    P-R Interval 228 ms    QRS Duration 90 ms    Q-T Interval 434 ms    QTC Calculation (Bezet) 434 ms    Calculated R Axis 63 degrees    Calculated T Axis 47 degrees    Diagnosis       Atrial-paced rhythm with prolonged AV conduction  Abnormal ECG  When compared with ECG of 03-DEC-2021 14:03,  Electronic atrial pacemaker has replaced Sinus rhythm  Confirmed by Bishop Mabel MALCOLM ()Rosita ([id number]) on 12/4/2021 7:57:52 AM     GLUCOSE, POC Collection Time: 12/04/21  5:27 AM   Result Value Ref Range    Glucose (POC) 96 65 - 100 mg/dL    Performed by Mary Up    METABOLIC PANEL, BASIC    Collection Time: 12/04/21  8:41 AM   Result Value Ref Range    Sodium 143 136 - 145 mmol/L    Potassium 3.2 (L) 3.5 - 5.1 mmol/L    Chloride 108 (H) 98 - 107 mmol/L    CO2 29 21 - 32 mmol/L    Anion gap 6 (L) 7 - 16 mmol/L    Glucose 86 65 - 100 mg/dL    BUN 11 8 - 23 MG/DL    Creatinine 0.57 (L) 0.6 - 1.0 MG/DL    GFR est AA >60 >60 ml/min/1.73m2    GFR est non-AA >60 >60 ml/min/1.73m2    Calcium 9.7 8.3 - 10.4 MG/DL   MAGNESIUM    Collection Time: 12/04/21  8:41 AM   Result Value Ref Range    Magnesium 2.1 1.8 - 2.4 mg/dL     Patient Instructions:   Current Discharge Medication List      CONTINUE these medications which have NOT CHANGED    Details   metoprolol ta-hydrochlorothiaz (LOPRESSOR HCT) 100-25 mg per tablet Take 1 Tablet by mouth daily. losartan (Cozaar) 50 mg tablet Take  by mouth daily. isosorbide mononitrate ER (IMDUR) 30 mg tablet TAKE 1 TABLET BY MOUTH EVERY MORNING  Qty: 90 Tablet, Refills: 3      apixaban (Eliquis) 5 mg tablet Take 1 Tablet by mouth two (2) times a day. Qty: 60 Tablet, Refills: 5      pantoprazole (PROTONIX) 40 mg tablet TAKE 1 TABLET BY MOUTH DAILY      methenamine hippurate (HIPREX) 1 gram tablet TAKE 1 TABLET BY MOUTH TWICE DAILY WITH MEALS  Qty: 180 Tab, Refills: 3      dicyclomine (BENTYL) 10 mg capsule 1 cap po Q8H PRN Diarrhea and cramps. Max daily amount 30 mg  Qty: 30 Cap, Refills: 1    Associated Diagnoses: Other irritable bowel syndrome      sertraline (ZOLOFT) 50 mg tablet Take 1 Tab by mouth daily. Qty: 90 Tab, Refills: 3    Associated Diagnoses: Mild depression (HCC)      oxybutynin chloride XL (DITROPAN XL) 10 mg CR tablet Take 1 Tab by mouth two (2) times a day.   Qty: 180 Tab, Refills: 3      magic mouthwash solution Magic mouth wash   Maalox  Lidocaine 2% viscous   Diphenhydramine oral solution Pharmacy to mix equal portions of ingredients to a total volume as indicated in the dispense amount. Qty: 320 mL, Refills: 0      bimatoprost (LUMIGAN) 0.01 % ophthalmic drops Administer 1 Drop to both eyes every evening. meloxicam (MOBIC) 7.5 mg tablet Take 1-2 Tabs by mouth daily as needed for Pain. Qty: 30 Tab, Refills: 3    Associated Diagnoses: Arthralgia, unspecified joint      nitroglycerin (NITROSTAT) 0.4 mg SL tablet DISSOLVE 1 TABLET UNDER TONGUE EVERY 5 MINUTES AS NEEDED FOR CHEST PAIN  Qty: 25 Tab, Refills: 3    Comments: **Patient requests 90 days supply**      acetaminophen (TYLENOL ARTHRITIS PAIN) 650 mg CR tablet Take 650 mg by mouth every six (6) hours as needed for Pain.

## 2021-12-04 NOTE — PROGRESS NOTES
am  12/4/2021 7:17 AM    Admit Date: 12/3/2021    Admit Diagnosis: Symptomatic bradycardia [R00.1]; Pacemaker [Z95.0]      Subjective:    Patient : Patient seen and is doing well with no complaints this morning. Patient underwent device implantation yesterday and appears ready for discharge    Objective:      Visit Vitals  BP (!) 145/67   Pulse 61   Temp 98 °F (36.7 °C)   Resp 18   Ht 5' 7\" (1.702 m)   Wt 172 lb (78 kg)   SpO2 95%   Breastfeeding No   BMI 26.94 kg/m²       ROS:  General ROS: negative for - chills  Hematological and Lymphatic ROS: negative for - blood clots or jaundice  Respiratory ROS: no cough, shortness of breath, or wheezing  Cardiovascular ROS: no chest pain or dyspnea on exertion  Gastrointestinal ROS: no abdominal pain, change in bowel habits, or black or bloody stools  Neurological ROS: no TIA or stroke symptoms    Physical Exam:      Physical Examination: General appearance - Appearance: alert, well appearing, and in no distress.    Neck/lymph - supple, no significant adenopathy  Chest/CV - clear to auscultation, no wheezes, rales or rhonchi, symmetric air entry  Heart - normal rate, regular rhythm, normal S1, S2, no murmurs, rubs, clicks or gallops  Abdomen/GI - soft, nontender, nondistended, no masses or organomegaly   Musculoskeletal - no joint tenderness, deformity or swelling  Extremities - peripheral pulses normal, no pedal edema, no clubbing or cyanosis  Skin - normal coloration and turgor, no rashes, no suspicious skin lesions noted    Current Facility-Administered Medications   Medication Dose Route Frequency    hydrALAZINE (APRESOLINE) 20 mg/mL injection 10 mg  10 mg IntraVENous Q6H PRN    lidocaine (XYLOCAINE) 10 mg/mL (1 %) injection 0.1 mL  0.1 mL SubCUTAneous PRN    latanoprost (XALATAN) 0.005 % ophthalmic solution 1 Drop  1 Drop Both Eyes QHS    dicyclomine (BENTYL) capsule 10 mg  10 mg Oral BID PRN    isosorbide mononitrate ER (IMDUR) tablet 30 mg  30 mg Oral DAILY    methenamine hippurate (HIPREX) tablet 1 g (Patient Supplied)  1 g Oral BID    oxybutynin chloride XL (DITROPAN XL) tablet 10 mg  10 mg Oral BID    pantoprazole (PROTONIX) tablet 40 mg  40 mg Oral DAILY    sertraline (ZOLOFT) tablet 50 mg  50 mg Oral DAILY    sodium chloride (NS) flush 5-40 mL  5-40 mL IntraVENous Q8H    sodium chloride (NS) flush 5-40 mL  5-40 mL IntraVENous PRN    acetaminophen (TYLENOL) tablet 650 mg  650 mg Oral Q4H PRN    HYDROcodone-acetaminophen (NORCO) 5-325 mg per tablet 1 Tablet  1 Tablet Oral Q4H PRN    ondansetron (ZOFRAN) injection 4 mg  4 mg IntraVENous Q4H PRN    docusate sodium (COLACE) capsule 100 mg  100 mg Oral BID PRN    losartan (COZAAR) tablet 50 mg  50 mg Oral DAILY    metoprolol tartrate (LOPRESSOR) tablet 100 mg  100 mg Oral DAILY       Data Review: data included in this note has been independently reviewed by the author   CMP: No results found for: NA, K, CL, CO2, AGAP, GLU, BUN, CREA, GFRAA, GFRNA, CA, MG, PHOS, ALB, TBIL, TP, ALB, GLOB, AGRAT, ALT  CBC: No results found for: WBC, HGB, HGBEXT, HCT, HCTEXT, PLT, PLTEXT, HGBEXT, HCTEXT, PLTEXT     TELEMETRY: nsr    Assessment/Plan:     Principal Problem:    Bradycardia (11/12/2021)    resolved    Active Problems:    Pacemaker (12/3/2021)    Site clean       Symptomatic bradycardia (12/3/2021)    Resolved    Patient appears ready for discharge she can follow-up in the office in 10 to 14 days and continue usual home medicines             Carroll Coker MD

## 2022-03-18 PROBLEM — R00.1 SYMPTOMATIC BRADYCARDIA: Status: ACTIVE | Noted: 2021-12-03

## 2022-03-18 PROBLEM — E87.6 HYPOKALEMIA: Status: ACTIVE | Noted: 2019-01-01

## 2022-03-18 PROBLEM — J18.9 RIGHT MIDDLE LOBE PNEUMONIA: Status: ACTIVE | Noted: 2019-01-01

## 2022-03-19 PROBLEM — F32.A MILD DEPRESSION: Status: ACTIVE | Noted: 2018-06-15

## 2022-03-19 PROBLEM — I48.0 PAROXYSMAL ATRIAL FIBRILLATION (HCC): Status: ACTIVE | Noted: 2021-10-29

## 2022-03-19 PROBLEM — R78.81 GRAM-POSITIVE COCCI BACTEREMIA: Status: ACTIVE | Noted: 2019-01-02

## 2022-03-19 PROBLEM — A41.9 SEPSIS (HCC): Status: ACTIVE | Noted: 2019-01-01

## 2022-03-19 PROBLEM — N17.9 ACUTE RENAL FAILURE (ARF) (HCC): Status: ACTIVE | Noted: 2019-01-01

## 2022-03-19 PROBLEM — Z95.0 PACEMAKER: Status: ACTIVE | Noted: 2021-12-03

## 2022-03-19 PROBLEM — R00.1 BRADYCARDIA: Status: ACTIVE | Noted: 2021-11-12

## 2022-03-20 PROBLEM — I49.5 SICK SINUS SYNDROME (HCC): Status: ACTIVE | Noted: 2017-06-26

## 2022-03-20 PROBLEM — J15.9 COMMUNITY ACQUIRED BACTERIAL PNEUMONIA: Status: ACTIVE | Noted: 2019-01-01

## 2022-04-14 ENCOUNTER — TRANSCRIBE ORDER (OUTPATIENT)
Dept: SCHEDULING | Age: 82
End: 2022-04-14

## 2022-04-14 DIAGNOSIS — R10.11 RIGHT UPPER QUADRANT PAIN: ICD-10-CM

## 2022-04-14 DIAGNOSIS — K83.8 OTHER SPECIFIED DISEASES OF BILIARY TRACT: Primary | ICD-10-CM

## 2022-05-10 ENCOUNTER — HOSPITAL ENCOUNTER (OUTPATIENT)
Dept: MRI IMAGING | Age: 82
Discharge: HOME | End: 2022-05-10
Attending: NURSE PRACTITIONER
Payer: MEDICARE

## 2022-05-10 DIAGNOSIS — R10.11 RIGHT UPPER QUADRANT PAIN: ICD-10-CM

## 2022-05-10 DIAGNOSIS — K83.8 OTHER SPECIFIED DISEASES OF BILIARY TRACT: ICD-10-CM

## 2022-05-10 PROCEDURE — 74181 MRI ABDOMEN W/O CONTRAST: CPT

## 2022-06-09 ENCOUNTER — TELEPHONE (OUTPATIENT)
Dept: CARDIOLOGY CLINIC | Age: 82
End: 2022-06-09

## 2022-06-09 RX ORDER — METHENAMINE HIPPURATE 1000 MG/1
TABLET ORAL
Qty: 180 TABLET | OUTPATIENT
Start: 2022-06-09

## 2022-06-09 NOTE — TELEPHONE ENCOUNTER
Pt is needing for a nurse to call her regarding  Her Eliquis ,because she either needs a coupon card or needs a written RX so she can send it to [facility] ([facility]). Please call pt asap because she needs to get her medicine.

## 2022-06-14 ENCOUNTER — TELEPHONE (OUTPATIENT)
Dept: CARDIOLOGY CLINIC | Age: 82
End: 2022-06-14

## 2022-06-14 ENCOUNTER — OFFICE VISIT (OUTPATIENT)
Dept: UROLOGY | Age: 82
End: 2022-06-14
Payer: MEDICARE

## 2022-06-14 DIAGNOSIS — N39.0 RECURRENT UTI: Primary | ICD-10-CM

## 2022-06-14 LAB
BILIRUBIN, URINE, POC: NEGATIVE
BLOOD URINE, POC: NORMAL
GLUCOSE URINE, POC: NEGATIVE
KETONES, URINE, POC: NEGATIVE
LEUKOCYTE ESTERASE, URINE, POC: NORMAL
NITRITE, URINE, POC: POSITIVE
PH, URINE, POC: 5.5 (ref 4.6–8)
PROTEIN,URINE, POC: NEGATIVE
SPECIFIC GRAVITY, URINE, POC: 1.02 (ref 1–1.03)
URINALYSIS CLARITY, POC: NORMAL
URINALYSIS COLOR, POC: NORMAL
UROBILINOGEN, POC: NORMAL

## 2022-06-14 PROCEDURE — 81003 URINALYSIS AUTO W/O SCOPE: CPT | Performed by: NURSE PRACTITIONER

## 2022-06-14 PROCEDURE — G8417 CALC BMI ABV UP PARAM F/U: HCPCS | Performed by: NURSE PRACTITIONER

## 2022-06-14 PROCEDURE — G8428 CUR MEDS NOT DOCUMENT: HCPCS | Performed by: NURSE PRACTITIONER

## 2022-06-14 PROCEDURE — 4004F PT TOBACCO SCREEN RCVD TLK: CPT | Performed by: NURSE PRACTITIONER

## 2022-06-14 PROCEDURE — 1090F PRES/ABSN URINE INCON ASSESS: CPT | Performed by: NURSE PRACTITIONER

## 2022-06-14 PROCEDURE — G8400 PT W/DXA NO RESULTS DOC: HCPCS | Performed by: NURSE PRACTITIONER

## 2022-06-14 PROCEDURE — 1123F ACP DISCUSS/DSCN MKR DOCD: CPT | Performed by: NURSE PRACTITIONER

## 2022-06-14 PROCEDURE — 99213 OFFICE O/P EST LOW 20 MIN: CPT | Performed by: NURSE PRACTITIONER

## 2022-06-14 RX ORDER — METHENAMINE HIPPURATE 1000 MG/1
1 TABLET ORAL 2 TIMES DAILY WITH MEALS
Qty: 180 TABLET | Refills: 3 | Status: SHIPPED | OUTPATIENT
Start: 2022-06-14

## 2022-06-14 RX ORDER — OXYBUTYNIN CHLORIDE 10 MG/1
10 TABLET, EXTENDED RELEASE ORAL 2 TIMES DAILY
Qty: 180 TABLET | Refills: 3 | Status: SHIPPED | OUTPATIENT
Start: 2022-06-14 | End: 2022-10-10

## 2022-06-14 ASSESSMENT — ENCOUNTER SYMPTOMS
NAUSEA: 0
BACK PAIN: 0

## 2022-06-14 NOTE — TELEPHONE ENCOUNTER
Patient having EGD on 07/21/22 with Dr. Mike Anderson needs eliquis hold for 48 hours prior.  Fax: [phone]

## 2022-06-14 NOTE — PROGRESS NOTES
[address], [address]  [phone]          Matt Knox  : 1940    Chief Complaint   Patient presents with    Urinary Tract Infection          HPI     Matt Knox is a 79 y.o. female followed for recurrent UTI and OAB. She does have significant history of a sling procedure due to stress urinary incontinence.  She denies any stress incontinence.     For OAB, she could not take Myrbetriq d/t cost. Was on oxybutynin xl 10 mg BID. She continued to have moderate urge incontinence, especially when standing. She was changed to [employer]. She is now on Vesicare 10 mg daily. She reports mild relief with this. Has only been on higher dose for one week.      For recurrent UTI, she remains on cranberry tablets and hiprex daily. Today she reports no problems r/t UTI. She is need of refills today. Past Medical History:   Diagnosis Date    Arrhythmia     Arthritis          CAD (coronary artery disease)     clot in coronary artery in  and it was dissolved with no further probelms.  Chronic pain     arthritis in knees    Dyslipidemia     GERD (gastroesophageal reflux disease)     controlled with med    Glaucoma     History of basal cell cancer     multiple    History of peptic ulcer  and     HTN (hypertension)     controlled with med    Hx of colonic polyps          Macular edema     Followed by Dr. Crow Floyd, [facility]    PUD (peptic ulcer disease)     Sick sinus syndrome ([facility] [address] 2017 - 24 hour  Monitor - NSR, occasional PAC, 2-3 episodes brief atrial tachycardia, longest 14 beats, asymptomatic.  2 episodes AIVR during sleep, rate 64, longest 4 beats.   No diary entries 2021- dual chamber Medtronic pacemaker    Staph infection 2018    s/p left great toenail removal     Past Surgical History:   Procedure Laterality Date    BLADDER SUSPENSION     [facility] CARDIAC CATHETERIZATION  , --2013    no intervention-normal    CATARACT REMOVAL Left 2012    with IOL    CATARACT REMOVAL Right 2007    with IOL    CHOLECYSTECTOMY, LAPAROSCOPIC  1999    and ERCP    COLONOSCOPY  09/23/2014    COLONOSCOPY      multiple    HYSTERECTOMY  1990    KNEE ARTHROSCOPY Left 1995    knee scope    KNEE ARTHROSCOPY Right 1996    knee scope    KNEE ARTHROSCOPY Right 2007    knee scope    LUMBAR LAMINECTOMY  1978 and 1979    back surg--x 2      PACEMAKER PLACEMENT  12/03/2021    [facility] CARDIAC SURG PROCEDURE UNLIST  2021    cardioversion x2    TUBAL LIGATION  1997    UPPER GASTROINTESTINAL ENDOSCOPY      multiple     Current Outpatient Medications   Medication Sig Dispense Refill    oxybutynin (DITROPAN-XL) 10 MG extended release tablet Take 1 tablet by mouth 2 times daily 180 tablet 3    methenamine (HIPREX) 1 g tablet Take 1 tablet by mouth 2 times daily (with meals) TAKE 1 TABLET BY MOUTH TWICE DAILY WITH MEALS 180 tablet 3    apixaban (ELIQUIS) 5 MG TABS tablet Take 1 tablet by mouth 2 times daily 180 tablet 3    acetaminophen (TYLENOL) 650 MG extended release tablet Take 650 mg by mouth every 6 hours as needed      bimatoprost (LUMIGAN) 0.01 % SOLN ophthalmic drops Apply 1 drop to eye every evening      dicyclomine (BENTYL) 10 MG capsule 1 cap po Q8H PRN Diarrhea and cramps.  Max daily amount 30 mg      isosorbide mononitrate (IMDUR) 30 MG extended release tablet TAKE 1 TABLET BY MOUTH EVERY MORNING      losartan (COZAAR) 100 MG tablet Take 100 mg by mouth daily      meloxicam (MOBIC) 7.5 MG tablet Take 7.5-15 mg by mouth daily as needed      metoprolol succinate (TOPROL XL) 100 MG extended release tablet Take 100 mg by mouth daily      nitroGLYCERIN (NITROSTAT) 0.4 MG SL tablet DISSOLVE 1 TABLET UNDER TONGUE EVERY 5 MINUTES AS NEEDED FOR CHEST PAIN      pantoprazole (PROTONIX) 40 MG tablet 2 times daily      sertraline (ZOLOFT) 50 MG tablet Take 50 mg by mouth daily      sucralfate (CARAFATE) 1 GM tablet 1 po tid (before meals)       No current facility-administered medications for this visit. Allergies   Allergen Reactions    Albuterol Shortness Of Breath     \"coughing spasms and difficulty breathing\"    Amoxicillin Anaphylaxis and Swelling     Other reaction(s): Lips/Mouth swelling-A    Guaifenesin Swelling     Lips swelling    Iodine Rash     Other reaction(s): Hives/Swelling-A    Ciprofloxacin Other (See Comments) and Nausea Only     Other reaction(s): Joint soreness-I    Dronedarone Angioedema, Diarrhea and Itching     Severe diarrhea, lips felt scorched & gum swelling & generalized itching    Macadamia Nut Oil Hives    Omega-3 Fatty Acids Hives    Sulfa Antibiotics Other (See Comments)     Leg cramps    Sulfamethoxazole-Trimethoprim Other (See Comments)     Social History     Socioeconomic History    Marital status:      Spouse name: Not on file    Number of children: Not on file    Years of education: Not on file    Highest education level: Not on file   Occupational History    Not on file   Tobacco Use    Smoking status: Never Smoker    Smokeless tobacco: Never Used   Substance and Sexual Activity    Alcohol use: Yes     Alcohol/week: 2.5 standard drinks    Drug use: No    Sexual activity: Not on file   Other Topics Concern    Not on file   Social History Narrative    Not on file     Social Determinants of Health     Financial Resource Strain:     Difficulty of Paying Living Expenses: Not on file   Food Insecurity:     Worried About Running Out of Food in the Last Year: Not on file    Estella of Food in the Last Year: Not on file   Transportation Needs:     Lack of Transportation (Medical): Not on file    Lack of Transportation (Non-Medical):  Not on file   Physical Activity:     Days of Exercise per Week: Not on file    Minutes of Exercise per Session: Not on file   Stress:     Feeling of Stress : Not on file   Social Connections:     Frequency of Communication with Friends and Family: Not on file    Frequency of Social Gatherings with Friends and Family: Not on file    Attends Hindu Services: Not on file    Active Member of Clubs or Organizations: Not on file    Attends Club or Organization Meetings: Not on file    Marital Status: Not on file   Intimate Partner Violence:     Fear of Current or Ex-Partner: Not on file    Emotionally Abused: Not on file    Physically Abused: Not on file    Sexually Abused: Not on file   Housing Stability:     Unable to Pay for Housing in the Last Year: Not on file    Number of [place] in the Last Year: Not on file    Unstable Housing in the Last Year: Not on file     Family History   Problem Relation Age of Onset    Alzheimer's Disease Sister     Delayed Awakening Neg Hx     Post-op Nausea/Vomiting Neg Hx     Emergence Delirium Neg Hx     Post-op Cognitive Dysfunction Neg Hx     Other Neg Hx     Lung Disease Father     Colon Cancer Father     Malig Hypertherm Neg Hx     Pseudochol.  Deficiency Neg Hx     Crohn's Disease Mother     Cancer Father         colon       ROS   See HPI    Urinalysis  UA - Dipstick  Results for orders placed or performed in visit on 06/14/22   AMB POC URINALYSIS DIP STICK AUTO W/O MICRO   Result Value Ref Range    Color (UA POC)      Clarity (UA POC)      Glucose, Urine, POC Negative Negative    Bilirubin, Urine, POC Negative Negative    KETONES, Urine, POC Negative Negative    Specific Gravity, Urine, POC 1.020 1.001 - 1.035    Blood (UA POC) Moderate Negative    pH, Urine, POC 5.5 4.6 - 8.0    Protein, Urine, POC Negative Negative    Urobilinogen, POC 0.2 mg/dL     Nitrite, Urine, POC Positive Negative    Leukocyte Esterase, Urine, POC Small Negative   Results for orders placed or performed in visit on 02/05/21   AMB POC URINALYSIS DIP STICK AUTO W/O MICRO   Result Value Ref Range    Color (UA POC) Yellow     Clarity (UA POC) Clear     Glucose, Urine, POC Negative Negative    Bilirubin, Urine, POC Negative Negative    KETONES, Urine, POC Negative Negative    Specific Gravity, Urine, POC 1.020 1.001 - 1.035 NA    Blood (UA POC) 2+ Negative    pH, Urine, POC 6.0 4.6 - 8.0 NA    Protein, Urine, POC Negative Negative    Urobilinogen, POC normal 0.2 - 1    Nitrite, Urine, POC Negative Negative    Leukocyte Esterase, Urine, POC Trace Negative       UA - Micro  WBC - 0  RBC - 0  Bacteria - 0  Epith - 0    PHYSICAL EXAM    GENERAL: No acute distress, Awake, Alert, Oriented X 3, Gait normal  ABDOMEN: soft, non tender, non-distended, positive bowel sounds, no organomegaly, no palpable masses, no guarding, no rebound tenderness  SKIN: No rash, no erythema, no lacerations or abrasions, no ecchymosis  MUSCULOSKELETAL - MAEW, no edema       Assessment and Plan    ICD-10-CM    1. Recurrent UTI  N39.0 AMB POC URINALYSIS DIP STICK AUTO W/O MICRO     oxybutynin (DITROPAN-XL) 10 MG extended release tablet     methenamine (HIPREX) 1 g tablet     PLAN:  Refills on meds sent to pharmacy  Follow up in 1 year  Sooner appt if any infection is suspected    Lewis Tolentino NP, APRN - NP  Dr. Nancy Mosley is supervising physician today and he approves plan of care. Return in about 1 year (around 6/14/2023) for for recheck. Elements of this note have been dictated using speech recognition software. Although reviewed, errors of speech recognition may have occurred.

## 2022-06-14 NOTE — PROGRESS NOTES
[address]icia [address], [address]  [phone]          Chey Sierra  : 1940    Chief Complaint   Patient presents with    Urinary Tract Infection          HPI     Chey Sierra is a 79 y.o. female          Past Medical History:   Diagnosis Date    Arrhythmia     Arthritis          CAD (coronary artery disease)     clot in coronary artery in  and it was dissolved with no further probelms.  Chronic pain     arthritis in knees    Dyslipidemia     GERD (gastroesophageal reflux disease)     controlled with med    Glaucoma     History of basal cell cancer     multiple    History of peptic ulcer  and     HTN (hypertension)     controlled with med    Hx of colonic polyps          Macular edema     Followed by Dr. Kaye Bryson, [facility]    PUD (peptic ulcer disease)     Sick sinus syndrome ([address] 2017 - 24 hour  Monitor - NSR, occasional PAC, 2-3 episodes brief atrial tachycardia, longest 14 beats, asymptomatic.  2 episodes AIVR during sleep, rate 64, longest 4 beats.   No diary entries 2021- dual chamber Medtronic pacemaker    Staph infection     s/p left great toenail removal     Past Surgical History:   Procedure Laterality Date    BLADDER SUSPENSION     [facility] CARDIAC CATHETERIZATION  , --2013    no intervention-normal    CATARACT REMOVAL Left     with IOL    CATARACT REMOVAL Right     with IOL    CHOLECYSTECTOMY, LAPAROSCOPIC      and ERCP    COLONOSCOPY  2014    COLONOSCOPY      multiple    HYSTERECTOMY      KNEE ARTHROSCOPY Left     knee scope    KNEE ARTHROSCOPY Right     knee scope    KNEE ARTHROSCOPY Right     knee scope    LUMBAR LAMINECTOMY   and     back surg--x 2      PACEMAKER PLACEMENT  2021    [facility] CARDIAC SURG PROCEDURE UNLIST      cardioversion x2    TUBAL LIGATION      UPPER GASTROINTESTINAL ENDOSCOPY      multiple     Current Outpatient Medications   Medication Sig Dispense Refill    apixaban (ELIQUIS) 5 MG TABS tablet Take 1 tablet by mouth 2 times daily 180 tablet 3    acetaminophen (TYLENOL) 650 MG extended release tablet Take 650 mg by mouth every 6 hours as needed      bimatoprost (LUMIGAN) 0.01 % SOLN ophthalmic drops Apply 1 drop to eye every evening      dicyclomine (BENTYL) 10 MG capsule 1 cap po Q8H PRN Diarrhea and cramps. Max daily amount 30 mg      isosorbide mononitrate (IMDUR) 30 MG extended release tablet TAKE 1 TABLET BY MOUTH EVERY MORNING      losartan (COZAAR) 100 MG tablet Take 100 mg by mouth daily      meloxicam (MOBIC) 7.5 MG tablet Take 7.5-15 mg by mouth daily as needed      methenamine (HIPREX) 1 g tablet TAKE 1 TABLET BY MOUTH TWICE DAILY WITH MEALS      metoprolol succinate (TOPROL XL) 100 MG extended release tablet Take 100 mg by mouth daily      nitroGLYCERIN (NITROSTAT) 0.4 MG SL tablet DISSOLVE 1 TABLET UNDER TONGUE EVERY 5 MINUTES AS NEEDED FOR CHEST PAIN      oxybutynin (DITROPAN-XL) 10 MG extended release tablet Take 10 mg by mouth 2 times daily      pantoprazole (PROTONIX) 40 MG tablet 2 times daily      sertraline (ZOLOFT) 50 MG tablet Take 50 mg by mouth daily      sucralfate (CARAFATE) 1 GM tablet 1 po tid (before meals)       No current facility-administered medications for this visit.      Allergies   Allergen Reactions    Albuterol Shortness Of Breath     \"coughing spasms and difficulty breathing\"    Amoxicillin Anaphylaxis and Swelling     Other reaction(s): Lips/Mouth swelling-A    Guaifenesin Swelling     Lips swelling    Iodine Rash     Other reaction(s): Hives/Swelling-A    Ciprofloxacin Other (See Comments) and Nausea Only     Other reaction(s): Joint soreness-I    Dronedarone Angioedema, Diarrhea and Itching     Severe diarrhea, lips felt scorched & gum swelling & generalized itching    Macadamia Nut Oil Hives    Omega-3 Fatty Acids Hives    Sulfa Antibiotics Other (See Comments)     Leg cramps    Sulfamethoxazole-Trimethoprim Other (See Comments)     Social History     Socioeconomic History    Marital status:      Spouse name: Not on file    Number of children: Not on file    Years of education: Not on file    Highest education level: Not on file   Occupational History    Not on file   Tobacco Use    Smoking status: Never Smoker    Smokeless tobacco: Never Used   Substance and Sexual Activity    Alcohol use: Yes     Alcohol/week: 2.5 standard drinks    Drug use: No    Sexual activity: Not on file   Other Topics Concern    Not on file   Social History Narrative    Not on file     Social Determinants of Health     Financial Resource Strain:     Difficulty of Paying Living Expenses: Not on file   Food Insecurity:     Worried About Running Out of Food in the Last Year: Not on file    Estella of Food in the Last Year: Not on file   Transportation Needs:     Lack of Transportation (Medical): Not on file    Lack of Transportation (Non-Medical):  Not on file   Physical Activity:     Days of Exercise per Week: Not on file    Minutes of Exercise per Session: Not on file   Stress:     Feeling of Stress : Not on file   Social Connections:     Frequency of Communication with Friends and Family: Not on file    Frequency of Social Gatherings with Friends and Family: Not on file    Attends Muslim Services: Not on file    Active Member of [address] [place] [address] [email or web address] or Organizations: Not on file    Attends Club or Organization Meetings: Not on file    Marital Status: Not on file   Intimate Partner Violence:     Fear of Current or Ex-Partner: Not on file    Emotionally Abused: Not on file    Physically Abused: Not on file    Sexually Abused: Not on file   Housing Stability:     Unable to Pay for Housing in the Last Year: Not on file    Number of [place] in the Last Year: Not on file    Unstable Housing in the Last Year: Not on file     Family History   Problem Relation Age of Onset    Alzheimer's Disease Sister     Delayed Awakening Neg Hx     Post-op Nausea/Vomiting Neg Hx     Emergence Delirium Neg Hx     Post-op Cognitive Dysfunction Neg Hx     Other Neg Hx     Lung Disease Father     Colon Cancer Father     Malig Hypertherm Neg Hx     Pseudochol. Deficiency Neg Hx     Crohn's Disease Mother     Cancer Father         colon       Review of Systems  Constitutional:   Negative for fever. GI:  Negative for nausea. Musculoskeletal:  Negative for back pain. Urinalysis  UA - Dipstick  Results for orders placed or performed in visit on 06/14/22   AMB POC URINALYSIS DIP STICK AUTO W/O MICRO   Result Value Ref Range    Color (UA POC)      Clarity (UA POC)      Glucose, Urine, POC Negative Negative    Bilirubin, Urine, POC Negative Negative    KETONES, Urine, POC Negative Negative    Specific Gravity, Urine, POC 1.020 1.001 - 1.035    Blood (UA POC) Moderate Negative    pH, Urine, POC 5.5 4.6 - 8.0    Protein, Urine, POC Negative Negative    Urobilinogen, POC 0.2 mg/dL     Nitrite, Urine, POC Positive Negative    Leukocyte Esterase, Urine, POC Small Negative   Results for orders placed or performed in visit on 02/05/21   AMB POC URINALYSIS DIP STICK AUTO W/O MICRO   Result Value Ref Range    Color (UA POC) Yellow     Clarity (UA POC) Clear     Glucose, Urine, POC Negative Negative    Bilirubin, Urine, POC Negative Negative    KETONES, Urine, POC Negative Negative    Specific Gravity, Urine, POC 1.020 1.001 - 1.035 NA    Blood (UA POC) 2+ Negative    pH, Urine, POC 6.0 4.6 - 8.0 NA    Protein, Urine, POC Negative Negative    Urobilinogen, POC normal 0.2 - 1    Nitrite, Urine, POC Negative Negative    Leukocyte Esterase, Urine, POC Trace Negative       UA - Micro  WBC - 0  RBC - 0  Bacteria - 0  Epith - 0        Assessment and Plan    ICD-10-CM    1.  Recurrent UTI  N39.0 AMB POC URINALYSIS DIP STICK AUTO W/O MICRO

## 2022-07-08 ENCOUNTER — TELEPHONE (OUTPATIENT)
Dept: CARDIOLOGY CLINIC | Age: 82
End: 2022-07-08

## 2022-07-08 NOTE — TELEPHONE ENCOUNTER
Requested Prescriptions     Signed Prescriptions Disp Refills    apixaban (ELIQUIS) 5 MG TABS tablet 180 tablet 3     Sig: Take 1 tablet by mouth 2 times daily     Authorizing Provider: Cathi ADAMS     Ordering User: Constantine Christine   Rx phoned in.

## 2022-07-10 ENCOUNTER — TELEPHONE (OUTPATIENT)
Dept: CARDIOLOGY CLINIC | Age: 82
End: 2022-07-10

## 2022-07-10 NOTE — TELEPHONE ENCOUNTER
Spoke with patient after call from paging . States that she has had some elevated heart rates upwards of 140 BPM and dizziness. Will have her try addition half dose (50 mg or metoprolol) to see if rhythm improves. If symptoms worsen instructed patient to seek ER evaluation or to call 911 to be taken to ER. She voiced understanding. No orders of the defined types were placed in this encounter.

## 2022-07-11 ENCOUNTER — TELEPHONE (OUTPATIENT)
Dept: CARDIOLOGY CLINIC | Age: 82
End: 2022-07-11

## 2022-07-11 NOTE — TELEPHONE ENCOUNTER
Tried contacting patient and phone just continuously rings. No transmission has come thru yet regarding her medtronic pacemaker.

## 2022-07-11 NOTE — TELEPHONE ENCOUNTER
Pt states yesterday afternoon she became very dizzy and tired. Checked BP, it was 109/75, . States she normally doesn't go above 85. By this am, HR was back down into the 80s. Called on-call, was instructed to take add'l metoprolol and believes this may have gotten her rate under control. States she was going to send a transmission, but the device said there was no one monitoring it and didn't know if she was allowed to send one. Explained to pt that she can send a transmission, just no one would see it until the next time the office is open. Verb understanding.

## 2022-07-11 NOTE — TELEPHONE ENCOUNTER
Transmission just came thru. Persistent AF ongoing since July 2nd. Some ventricular rates exceeding 100 bpm when in AF. Atrial therapies (ATP) unsuccessful since the rhythm is Afib and not Aflutter.

## 2022-07-11 NOTE — TELEPHONE ENCOUNTER
Reviewed Dr. Dee Márquez recommendation with pt. Verb understanding. Will call if additional metoprolol does not control afib.

## 2022-07-21 ENCOUNTER — HOSPITAL ENCOUNTER (OUTPATIENT)
Dept: LAB | Age: 82
Discharge: HOME | End: 2022-07-24

## 2022-07-21 PROCEDURE — 88312 SPECIAL STAINS GROUP 1: CPT

## 2022-07-21 PROCEDURE — 88305 TISSUE EXAM BY PATHOLOGIST: CPT

## 2022-07-26 RX ORDER — DICYCLOMINE HYDROCHLORIDE 10 MG/1
CAPSULE ORAL
Qty: 90 CAPSULE | OUTPATIENT
Start: 2022-07-26

## 2022-08-10 ENCOUNTER — OFFICE VISIT (OUTPATIENT)
Dept: CARDIOLOGY CLINIC | Age: 82
End: 2022-08-10
Payer: MEDICARE

## 2022-08-10 VITALS
HEIGHT: 67 IN | BODY MASS INDEX: 28.41 KG/M2 | SYSTOLIC BLOOD PRESSURE: 160 MMHG | DIASTOLIC BLOOD PRESSURE: 100 MMHG | WEIGHT: 181 LBS | HEART RATE: 96 BPM

## 2022-08-10 DIAGNOSIS — R60.9 PERIPHERAL EDEMA: ICD-10-CM

## 2022-08-10 DIAGNOSIS — R06.02 SHORTNESS OF BREATH: Primary | ICD-10-CM

## 2022-08-10 DIAGNOSIS — I10 ESSENTIAL HYPERTENSION: ICD-10-CM

## 2022-08-10 DIAGNOSIS — I48.0 PAROXYSMAL ATRIAL FIBRILLATION (HCC): ICD-10-CM

## 2022-08-10 DIAGNOSIS — R06.02 SHORTNESS OF BREATH: ICD-10-CM

## 2022-08-10 DIAGNOSIS — I49.5 SICK SINUS SYNDROME (HCC): ICD-10-CM

## 2022-08-10 DIAGNOSIS — Z95.0 PACEMAKER: ICD-10-CM

## 2022-08-10 LAB
ERYTHROCYTE [DISTWIDTH] IN BLOOD BY AUTOMATED COUNT: 14 % (ref 11.9–14.6)
HCT VFR BLD AUTO: 41.8 % (ref 35.8–46.3)
HGB BLD-MCNC: 12.9 G/DL (ref 11.7–15.4)
MCH RBC QN AUTO: 30 PG (ref 26.1–32.9)
MCHC RBC AUTO-ENTMCNC: 30.9 G/DL (ref 31.4–35)
MCV RBC AUTO: 97.2 FL (ref 79.6–97.8)
NRBC # BLD: 0 K/UL (ref 0–0.2)
PLATELET # BLD AUTO: 234 K/UL (ref 150–450)
PMV BLD AUTO: 10.7 FL (ref 9.4–12.3)
RBC # BLD AUTO: 4.3 M/UL (ref 4.05–5.2)
WBC # BLD AUTO: 7.7 K/UL (ref 4.3–11.1)

## 2022-08-10 PROCEDURE — 99214 OFFICE O/P EST MOD 30 MIN: CPT | Performed by: INTERNAL MEDICINE

## 2022-08-10 PROCEDURE — 1123F ACP DISCUSS/DSCN MKR DOCD: CPT | Performed by: INTERNAL MEDICINE

## 2022-08-10 PROCEDURE — 1090F PRES/ABSN URINE INCON ASSESS: CPT | Performed by: INTERNAL MEDICINE

## 2022-08-10 PROCEDURE — 1036F TOBACCO NON-USER: CPT | Performed by: INTERNAL MEDICINE

## 2022-08-10 PROCEDURE — G8417 CALC BMI ABV UP PARAM F/U: HCPCS | Performed by: INTERNAL MEDICINE

## 2022-08-10 PROCEDURE — G8427 DOCREV CUR MEDS BY ELIG CLIN: HCPCS | Performed by: INTERNAL MEDICINE

## 2022-08-10 PROCEDURE — G8400 PT W/DXA NO RESULTS DOC: HCPCS | Performed by: INTERNAL MEDICINE

## 2022-08-10 RX ORDER — CHLORTHALIDONE 25 MG/1
25 TABLET ORAL DAILY
Qty: 90 TABLET | Refills: 3 | Status: SHIPPED | OUTPATIENT
Start: 2022-08-10

## 2022-08-10 RX ORDER — ISOSORBIDE MONONITRATE 30 MG/1
30 TABLET, EXTENDED RELEASE ORAL DAILY
Qty: 90 TABLET | Refills: 3 | Status: SHIPPED | OUTPATIENT
Start: 2022-08-10

## 2022-08-10 RX ORDER — METOPROLOL SUCCINATE 100 MG/1
100 TABLET, EXTENDED RELEASE ORAL DAILY
Qty: 90 TABLET | Refills: 3 | Status: ON HOLD | OUTPATIENT
Start: 2022-08-10 | End: 2022-10-12 | Stop reason: SDUPTHER

## 2022-08-10 RX ORDER — LOSARTAN POTASSIUM 100 MG/1
100 TABLET ORAL DAILY
Qty: 90 TABLET | Refills: 3 | Status: ON HOLD | OUTPATIENT
Start: 2022-08-10 | End: 2022-10-12 | Stop reason: SDUPTHER

## 2022-08-10 ASSESSMENT — ENCOUNTER SYMPTOMS
SHORTNESS OF BREATH: 1
SLEEP DISTURBANCES DUE TO BREATHING: 0
SNORING: 0

## 2022-08-10 NOTE — PATIENT INSTRUCTIONS
Patient Education        Shortness of Breath: Care Instructions  Your Care Instructions     Shortness of breath has many causes. Sometimes conditions such as anxiety can lead to shortness of breath. Some people get mild shortness of breath when they exercise. Trouble breathing also can be a symptom of a serious problem, such asasthma, lung disease, emphysema, heart problems, and pneumonia. If your shortness of breath continues, you may need tests and treatment. Watchfor any changes in your breathing and other symptoms. Follow-up care is a key part of your treatment and safety. Be sure to make and go to all appointments, and call your doctor if you are having problems. It's also a good idea to know your test results and keep alist of the medicines you take. How can you care for yourself at home? Do not smoke or allow others to smoke around you. If you need help quitting, talk to your doctor about stop-smoking programs and medicines. These can increase your chances of quitting for good. Get plenty of rest and sleep. Take your medicines exactly as prescribed. Call your doctor if you think you are having a problem with your medicine. Find healthy ways to deal with stress. Exercise daily. Get plenty of sleep. Eat regularly and well. When should you call for help? Call 911 anytime you think you may need emergency care. For example, call if:    You have severe shortness of breath. You have symptoms of a heart attack. These may include:  Chest pain or pressure, or a strange feeling in the chest.  Sweating. Shortness of breath. Nausea or vomiting. Pain, pressure, or a strange feeling in the back, neck, jaw, or upper belly or in one or both shoulders or arms. Lightheadedness or sudden weakness. A fast or irregular heartbeat. After you call 911, the  may tell you to chew 1 adult-strength or 2 to 4 low-dose aspirin. Wait for an ambulance. Do not try to drive yourself.    Call your doctor now or seek immediate medical care if:    Your shortness of breath gets worse or you start to wheeze. Wheezing is a high-pitched sound when you breathe. You wake up at night out of breath or have to prop your head up on several pillows to breathe. You are short of breath after only light activity or while at rest.   Watch closely for changes in your health, and be sure to contact your doctor if:    You do not get better over the next 1 to 2 days. Where can you learn more? Go to [email or web address] org and sign in to your [employer] account. Enter S780 in the [employer] box to learn more about \"Shortness of Breath: Care Instructions. \"     If you do not have an account, please click on the \"Sign Up Now\" link. Current as of: March 9, 2022               Content Version: 13.3  © 0000-9638 [employer]. Care instructions adapted under license by [facility] ([facility]). If you have questions about a medical condition or this instruction, always ask your healthcare professional. [address] any warranty or liability for your use of this information.

## 2022-08-10 NOTE — PROGRESS NOTES
[facility] CARDIOLOGY  [address], [address]  PHONE: [phone]      08/10/22    NAME:  Dania Rose Solder  : 1940  MRN: [id number]         SUBJECTIVE:   Nina Rojas is a 79 y.o. female seen for a follow up visit regarding the following:     Chief Complaint   Patient presents with    Hypertension    Bradycardia     9 month follow up    Atrial Fibrillation    Shortness of Breath    Weight Gain            HPI:  Follow up  Hypertension, Bradycardia (9 month follow up), Atrial Fibrillation, Shortness of Breath, and Weight Gain   . Follow up afib, highly symptomatic when in afib and ultimately had DCPM with reactive ATP for AF. At her last check in , afib burden was indeed very low, < 0.1%,   She complains of a sudden weight gain with dyspnea the last week, she has an old rx for maxzide which was prescribed daily but she only takes \"prn\" because taking it daily made her feel her legs cramp. Her weight went up 10 lbs in a week. She's not been on vacation, hasn't changed diet,  Has been taking rifaxamin for ~ 2 weeks, for GI issues, which she plans to stop today because of the diarrhea. It also lists ~ 15% incidence of peripheral edema. No fever, occasional dry cough  +nausea and diarrhea, had esophageal dilatation recently. + fatigue, denies snoring, daytime somnolence or restless sleep. Past cardiac history:   prior thrombotic occlusion of the LAD. No obstructive disease at cath 2002   Aug 2013: Cath no obstructive CAD   2016 - EF 60-65%. pseudonormal diastolic function, RVSP 43   2017 - Lexiscan MPI - normal EKG, perfusion and LVEF   2017 - 24 hour  Monitor - NSR, occasional PAC, 2-3 episodes brief atrial tachycardia, longest 14 beats, asymptomatic.  2 episodes AIVR during sleep, rate 64, longest 4 beats.   No diary entries   Oct 2021- afib   Oct 2021- Echo - EF 50-55%, mild to mod MR, bi atrial enlargement, mild RVE, RVSP 44-no significant change from previous   10/28/21 - successful DCCV but with early return to AF   Mar 2022- DCPM for symptomatic lenora with reactive ATP for AF (Dr Sophie Vasquez)          Key CAD CHF Meds            chlorthalidone (HYGROTON) 25 MG tablet (Taking)    Sig - Route: Take 1 tablet by mouth in the morning. - Oral    losartan (COZAAR) 100 MG tablet (Taking)    Sig - Route: Take 1 tablet by mouth in the morning. - Oral    metoprolol succinate (TOPROL XL) 100 MG extended release tablet (Taking)    Sig - Route: Take 1 tablet by mouth in the morning. - Oral    apixaban (ELIQUIS) 5 MG TABS tablet (Taking)    Sig - Route: Take 1 tablet by mouth 2 times daily - Oral    Class: Phone In              Past Medical History, Past Surgical History, Family history, Social History, and Medications were all reviewed with the patient today and updated as necessary. Prior to Admission medications    Medication Sig Start Date End Date Taking? Authorizing Provider   chlorthalidone (HYGROTON) 25 MG tablet Take 1 tablet by mouth in the morning. 8/10/22  Yes Cody Veliz MD   isosorbide mononitrate (IMDUR) 30 MG extended release tablet Take 1 tablet by mouth in the morning. TAKE 1 TABLET BY MOUTH EVERY MORNING. 8/10/22  Yes Cody Veliz MD   losartan (COZAAR) 100 MG tablet Take 1 tablet by mouth in the morning. 8/10/22  Yes Cody Veliz MD   metoprolol succinate (TOPROL XL) 100 MG extended release tablet Take 1 tablet by mouth in the morning.  8/10/22  Yes Cody Veliz MD   apixaban (ELIQUIS) 5 MG TABS tablet Take 1 tablet by mouth 2 times daily 7/8/22  Yes Cody Veliz MD   oxybutynin (DITROPAN-XL) 10 MG extended release tablet Take 1 tablet by mouth 2 times daily 6/14/22  Yes GREGORIA Moore NP   methenamine (HIPREX) 1 g tablet Take 1 tablet by mouth 2 times daily (with meals) TAKE 1 TABLET BY MOUTH TWICE DAILY WITH MEALS 6/14/22  Yes GREGORIA Moore NP   acetaminophen (TYLENOL) 650 MG extended release tablet Take 650 mg by mouth every 6 hours as needed   Yes Ar Automatic Reconciliation   bimatoprost (LUMIGAN) 0.01 % SOLN ophthalmic drops Apply 1 drop to eye every evening   Yes Ar Automatic Reconciliation   dicyclomine (BENTYL) 10 MG capsule 1 cap po Q8H PRN Diarrhea and cramps. Max daily amount 30 mg 2/5/21  Yes Ar Automatic Reconciliation   meloxicam (MOBIC) 7.5 MG tablet Take 7.5-15 mg by mouth daily as needed 12/4/18  Yes Ar Automatic Reconciliation   nitroGLYCERIN (NITROSTAT) 0.4 MG SL tablet DISSOLVE 1 TABLET UNDER TONGUE EVERY 5 MINUTES AS NEEDED FOR CHEST PAIN 6/13/18  Yes Ar Automatic Reconciliation   pantoprazole (PROTONIX) 40 MG tablet 2 times daily 3/14/21  Yes Ar Automatic Reconciliation   sertraline (ZOLOFT) 50 MG tablet Take 50 mg by mouth daily 2/10/22  Yes Ar Automatic Reconciliation   sucralfate (CARAFATE) 1 GM tablet 1 po tid (before meals) 2/10/22  Yes Ar Automatic Reconciliation     Allergies   Allergen Reactions    Albuterol Shortness Of Breath     \"coughing spasms and difficulty breathing\"    Amoxicillin Anaphylaxis and Swelling     Other reaction(s): Lips/Mouth swelling-A    Guaifenesin Swelling     Lips swelling    Iodine Rash     Other reaction(s): Hives/Swelling-A    Ciprofloxacin Other (See Comments) and Nausea Only     Other reaction(s): Joint soreness-I    Dronedarone Angioedema, Diarrhea and Itching     Severe diarrhea, lips felt scorched & gum swelling & generalized itching    Macadamia Nut Oil Hives    Omega-3 Fatty Acids Hives    Sulfa Antibiotics Other (See Comments)     Leg cramps    Sulfamethoxazole-Trimethoprim Other (See Comments)     Past Medical History:   Diagnosis Date    Arrhythmia     Arthritis          CAD (coronary artery disease)     clot in coronary artery in 2001 and it was dissolved with no further probelms.     Chronic pain     arthritis in knees    Dyslipidemia     GERD (gastroesophageal reflux disease)     controlled with med Glaucoma     History of basal cell cancer     multiple    History of peptic ulcer 2002 and 2014    HTN (hypertension)     controlled with med    Hx of colonic polyps          Macular edema     Followed by [facility]    PUD (peptic ulcer disease)     Sick sinus syndrome ([address] 6/26/2017 Jun 2017 - 24 hour  Monitor - NSR, occasional PAC, 2-3 episodes brief atrial tachycardia, longest 14 beats, asymptomatic.  2 episodes AIVR during sleep, rate 64, longest 4 beats. No diary entries Nov 2021- dual chamber Medtronic pacemaker    Staph infection 2018    s/p left great toenail removal     Past Surgical History:   Procedure Laterality Date    BLADDER SUSPENSION  2006    CARDIAC CATHETERIZATION  2001, 2002--2013    no intervention-normal    CATARACT REMOVAL Left 2012    with IOL    CATARACT REMOVAL Right 2007    with IOL    CHOLECYSTECTOMY, LAPAROSCOPIC  1999    and ERCP    COLONOSCOPY  09/23/2014    COLONOSCOPY      multiple    HYSTERECTOMY (CERVIX STATUS UNKNOWN)  1990    KNEE ARTHROSCOPY Left 1995    knee scope    KNEE ARTHROSCOPY Right 1996    knee scope    KNEE ARTHROSCOPY Right 2007    knee scope    LUMBAR LAMINECTOMY  1978 and 1979    back surg--x 2      PACEMAKER PLACEMENT  12/03/2021    [place] CARDIAC SURG PROCEDURE UNLIST  2021    cardioversion x2    TUBAL LIGATION  1997    UPPER GASTROINTESTINAL ENDOSCOPY      multiple     Family History   Problem Relation Age of Onset    Alzheimer's Disease Sister     Delayed Awakening Neg Hx     Post-op Nausea/Vomiting Neg Hx     Emergence Delirium Neg Hx     Post-op Cognitive Dysfunction Neg Hx     Other Neg Hx     Lung Disease Father     Colon Cancer Father     Malig Hypertherm Neg Hx     Pseudochol. Deficiency Neg Hx     Crohn's Disease Mother     Cancer Father         colon     Social History     Tobacco Use    Smoking status: Never    Smokeless tobacco: Never   Substance Use Topics    Alcohol use:  Yes     Alcohol/week: 2.5 standard drinks ROS:    Review of Systems   Constitutional: Positive for malaise/fatigue. Cardiovascular:  Positive for leg swelling. Negative for chest pain. Respiratory:  Positive for shortness of breath. Negative for sleep disturbances due to breathing and snoring. PHYSICAL EXAM:   BP (!) 160/100   Pulse 96   Ht 5' 7\" (1.702 m)   Wt 181 lb (82.1 kg)   BMI 28.35 kg/m²      Wt Readings from Last 3 Encounters:   08/10/22 181 lb (82.1 kg)   03/17/22 180 lb (81.6 kg)   02/10/22 179 lb 6.4 oz (81.4 kg)     BP Readings from Last 3 Encounters:   08/10/22 (!) 160/100   03/17/22 (!) 144/88   02/10/22 (!) 144/84     Pulse Readings from Last 3 Encounters:   08/10/22 96   03/17/22 68   02/10/22 68           Physical Exam  Constitutional:       Appearance: Normal appearance. HENT:      Head: Normocephalic and atraumatic. Eyes:      Conjunctiva/sclera: Conjunctivae normal.   Neck:      Vascular: No carotid bruit. Cardiovascular:      Rate and Rhythm: Normal rate and regular rhythm. Heart sounds: No murmur heard. No friction rub. No gallop. Pulmonary:      Effort: No respiratory distress. Breath sounds: No wheezing or rales. Musculoskeletal:         General: Swelling (2+ below knees, involves toes, mostly non pitting, with associated moderate venous varicosities) present. Cervical back: Neck supple. Skin:     General: Skin is warm and dry. Neurological:      General: No focal deficit present. Mental Status: She is alert. Psychiatric:         Mood and Affect: Mood normal.         Behavior: Behavior normal.       Medical problems and test results were reviewed with the patient today.      DATA REVIEW    LIPID PANEL     Lab Results   Component Value Date    CHOL 176 02/10/2022    CHOL 148 02/05/2021    CHOL 171 02/03/2020     Lab Results   Component Value Date    TRIG 82 02/10/2022    TRIG 73 02/05/2021    TRIG 83 02/03/2020     Lab Results   Component Value Date    HDL 64 02/10/2022 HDL 62 02/05/2021    HDL 68 02/03/2020     Lab Results   Component Value Date    LDLCALC 97 02/10/2022    LDLCALC 72 02/05/2021    LDLCALC 86 02/03/2020     Lab Results   Component Value Date    LABVLDL 17 02/03/2020    VLDL 15 02/10/2022    VLDL 14 02/05/2021     No results found for: CHOLHDLRATIO    BMP  Lab Results   Component Value Date/Time     02/10/2022 10:13 AM    K 4.2 02/10/2022 10:13 AM     02/10/2022 10:13 AM    CO2 23 02/10/2022 10:13 AM    BUN 16 02/10/2022 10:13 AM    CREATININE 0.58 02/10/2022 10:13 AM    GLUCOSE 102 02/10/2022 10:13 AM    CALCIUM 9.3 02/10/2022 10:13 AM          EKG        CXR/IMAGING        DEVICE INTERROGATION        OUTSIDE RECORDS REVIEW    Records from outside providers have been reviewed and summarized as noted in the HPI, past history and data review sections of this note, and reviewed with patient. .       ASSESSMENT and PLAN    Shmuel Pozo was seen today for hypertension, bradycardia, atrial fibrillation, shortness of breath and weight gain. Diagnoses and all orders for this visit:    Shortness of breath  -     TSH; Future  -     Comprehensive Metabolic Panel; Future  -     CBC; Future  -     Brain Natriuretic Peptide; Future  -     Transthoracic echocardiogram (TTE) complete with contrast, bubble, strain, and 3D PRN; Future  -     Nuclear stress test with myocardial perfusion; Future    Sick sinus syndrome (HCC)    Pacemaker    Paroxysmal atrial fibrillation (HCC)    Essential hypertension    Peripheral edema    Other orders  -     chlorthalidone (HYGROTON) 25 MG tablet; Take 1 tablet by mouth in the morning.  -     isosorbide mononitrate (IMDUR) 30 MG extended release tablet; Take 1 tablet by mouth in the morning. TAKE 1 TABLET BY MOUTH EVERY MORNING. -     losartan (COZAAR) 100 MG tablet; Take 1 tablet by mouth in the morning.  -     metoprolol succinate (TOPROL XL) 100 MG extended release tablet;  Take 1 tablet by mouth in the morning. IMPRESSION:    Recent onset edema, dyspnea. Edema possible SE of antibiotic but shouldn't have caused dyspnea, she's planning to stop that either way today because of GI effects and she is urged to report that to her GI provider    Meanwhile, she will begin daily chlorthalidone while obtaining the multiple labs above along with echo and perfusion study. ER if worse in the interim    Afib burden remains low. Possibly having SE of BB after all these years on high dose? Will need to consider this going forward as well. Return for AFTER PROCEDURE TO REVIEW RESULTS. Thank you for allowing me to participate in this patient's care. Please call or contact me if there are any questions or concerns regarding the above.       Master Jorge MD  08/10/22  10:53 AM

## 2022-08-11 LAB
ALBUMIN SERPL-MCNC: 3.8 G/DL (ref 3.2–4.6)
ALBUMIN/GLOB SERPL: 1.1 {RATIO} (ref 1.2–3.5)
ALP SERPL-CCNC: 120 U/L (ref 50–136)
ALT SERPL-CCNC: 26 U/L (ref 12–65)
ANION GAP SERPL CALC-SCNC: 3 MMOL/L (ref 7–16)
AST SERPL-CCNC: 18 U/L (ref 15–37)
BILIRUB SERPL-MCNC: 0.8 MG/DL (ref 0.2–1.1)
BUN SERPL-MCNC: 16 MG/DL (ref 8–23)
CALCIUM SERPL-MCNC: 9.1 MG/DL (ref 8.3–10.4)
CHLORIDE SERPL-SCNC: 111 MMOL/L (ref 98–107)
CO2 SERPL-SCNC: 26 MMOL/L (ref 21–32)
CREAT SERPL-MCNC: 0.7 MG/DL (ref 0.6–1)
GLOBULIN SER CALC-MCNC: 3.6 G/DL (ref 2.3–3.5)
GLUCOSE SERPL-MCNC: 97 MG/DL (ref 65–100)
POTASSIUM SERPL-SCNC: 4.4 MMOL/L (ref 3.5–5.1)
PROT SERPL-MCNC: 7.4 G/DL (ref 6.3–8.2)
SODIUM SERPL-SCNC: 140 MMOL/L (ref 136–145)
TSH, 3RD GENERATION: 1.95 UIU/ML (ref 0.36–3.74)

## 2022-08-11 RX ORDER — DICYCLOMINE HYDROCHLORIDE 10 MG/1
CAPSULE ORAL
Qty: 30 CAPSULE | Refills: 1 | Status: SHIPPED | OUTPATIENT
Start: 2022-08-11

## 2022-08-30 ENCOUNTER — OFFICE VISIT (OUTPATIENT)
Dept: CARDIOLOGY CLINIC | Age: 82
End: 2022-08-30
Payer: MEDICARE

## 2022-08-30 VITALS
BODY MASS INDEX: 27.56 KG/M2 | HEART RATE: 84 BPM | DIASTOLIC BLOOD PRESSURE: 80 MMHG | WEIGHT: 175.6 LBS | HEIGHT: 67 IN | SYSTOLIC BLOOD PRESSURE: 134 MMHG

## 2022-08-30 DIAGNOSIS — I10 ESSENTIAL HYPERTENSION: ICD-10-CM

## 2022-08-30 DIAGNOSIS — I49.5 SICK SINUS SYNDROME (HCC): Primary | ICD-10-CM

## 2022-08-30 DIAGNOSIS — I48.0 PAROXYSMAL ATRIAL FIBRILLATION (HCC): ICD-10-CM

## 2022-08-30 DIAGNOSIS — I25.118 CORONARY ARTERY DISEASE OF NATIVE ARTERY OF NATIVE HEART WITH STABLE ANGINA PECTORIS (HCC): ICD-10-CM

## 2022-08-30 DIAGNOSIS — Z95.0 PACEMAKER: ICD-10-CM

## 2022-08-30 PROCEDURE — 99214 OFFICE O/P EST MOD 30 MIN: CPT | Performed by: INTERNAL MEDICINE

## 2022-08-30 PROCEDURE — G8417 CALC BMI ABV UP PARAM F/U: HCPCS | Performed by: INTERNAL MEDICINE

## 2022-08-30 PROCEDURE — 1036F TOBACCO NON-USER: CPT | Performed by: INTERNAL MEDICINE

## 2022-08-30 PROCEDURE — 1123F ACP DISCUSS/DSCN MKR DOCD: CPT | Performed by: INTERNAL MEDICINE

## 2022-08-30 PROCEDURE — 1090F PRES/ABSN URINE INCON ASSESS: CPT | Performed by: INTERNAL MEDICINE

## 2022-08-30 PROCEDURE — G8400 PT W/DXA NO RESULTS DOC: HCPCS | Performed by: INTERNAL MEDICINE

## 2022-08-30 PROCEDURE — G8427 DOCREV CUR MEDS BY ELIG CLIN: HCPCS | Performed by: INTERNAL MEDICINE

## 2022-08-30 NOTE — PATIENT INSTRUCTIONS
Patient Education        Atrial Fibrillation: Care Instructions  Your Care Instructions     Atrial fibrillation is an irregular and often fast heartbeat. Treating this condition is important for several reasons. It can cause blood clots, which can travel from your heart to your brain and cause a stroke. If you have a fast heartbeat, you may feel lightheaded, dizzy, and weak. An irregular heartbeatcan also increase your risk for heart failure. Atrial fibrillation is often the result of another heart condition, such as high blood pressure or coronary artery disease. Making changes to improve yourheart condition will help you stay healthy and active. Follow-up care is a key part of your treatment and safety. Be sure to make and go to all appointments, and call your doctor if you are having problems. It's also a good idea to know your test results and keep alist of the medicines you take. How can you care for yourself at home? Medicines    Take your medicines exactly as prescribed. Call your doctor if you think you are having a problem with your medicine. You will get more details on the specific medicines your doctor prescribes. If your doctor has given you a blood thinner to prevent a stroke, be sure you get instructions about how to take your medicine safely. Blood thinners can cause serious bleeding problems. Do not take any vitamins, over-the-counter drugs, or herbal products without talking to your doctor first.   Lifestyle changes    Do not smoke. Smoking can increase your chance of a stroke and heart attack. If you need help quitting, talk to your doctor about stop-smoking programs and medicines. These can increase your chances of quitting for good. Eat a heart-healthy diet. Stay at a healthy weight. Lose weight if you need to. Limit alcohol to 2 drinks a day for men and 1 drink a day for women. Too much alcohol can cause health problems. Avoid colds and flu.  Get a pneumococcal vaccine shot. If you have had one before, ask your doctor whether you need another dose. Get a flu shot every year. If you must be around people with colds or flu, wash your hands often. Activity    If your doctor recommends it, get more exercise. Walking is a good choice. Bit by bit, increase the amount you walk every day. Try for at least 30 minutes on most days of the week. You also may want to swim, bike, or do other activities. Your doctor may suggest that you join a cardiac rehabilitation program so that you can have help increasing your physical activity safely. Start light exercise if your doctor says it is okay. Even a small amount will help you get stronger, have more energy, and manage stress. Walking is an easy way to get exercise. Start out by walking a little more than you did in the hospital. Gradually increase the amount you walk. When you exercise, watch for signs that your heart is working too hard. You are pushing too hard if you cannot talk while you are exercising. If you become short of breath or dizzy or have chest pain, sit down and rest immediately. Check your pulse regularly. Place two fingers on the artery at the palm side of your wrist, in line with your thumb. If your heartbeat seems uneven or fast, talk to your doctor. When should you call for help? Call 911 anytime you think you may need emergency care. For example, call if:    You have symptoms of a heart attack. These may include:  Chest pain or pressure, or a strange feeling in the chest.  Sweating. Shortness of breath. Nausea or vomiting. Pain, pressure, or a strange feeling in the back, neck, jaw, or upper belly or in one or both shoulders or arms. Lightheadedness or sudden weakness. A fast or irregular heartbeat. After you call 911, the  may tell you to chew 1 adult-strength or 2 to 4 low-dose aspirin. Wait for an ambulance. Do not try to drive yourself. You have symptoms of a stroke.  These may include:  Sudden numbness, tingling, weakness, or loss of movement in your face, arm, or leg, especially on only one side of your body. Sudden vision changes. Sudden trouble speaking. Sudden confusion or trouble understanding simple statements. Sudden problems with walking or balance. A sudden, severe headache that is different from past headaches. You passed out (lost consciousness). Call your doctor now or seek immediate medical care if:    You have new or increased shortness of breath. You feel dizzy or lightheaded, or you feel like you may faint. Your heart rate becomes irregular. You can feel your heart flutter in your chest or skip heartbeats. Tell your doctor if these symptoms are new or worse. Watch closely for changes in your health, and be sure to contact your doctor ifyou have any problems. Where can you learn more? Go to https://[employer]pet[employer]itaeweb.[employer]. org and sign in to your [employer] account. Enter U020 in the [employer] box to learn more about \"Atrial Fibrillation: Care Instructions. \"     If you do not have an account, please click on the \"Sign Up Now\" link. Current as of: January 10, 2022               Content Version: 13.3  © 2632-2164 [employer], [employer]. Care instructions adapted under license by [facility] [employer] [facility] ([facility]). If you have questions about a medical condition or this instruction, always ask your healthcare professional. [address] any warranty or liability for your use of this information.

## 2022-08-30 NOTE — PROGRESS NOTES
[facility] CARDIOLOGY  [address], [address]  PHONE: [phone]      22    NAME:  Peyton Adorno  : 1940  MRN: [id number]         SUBJECTIVE:   Peyton Adorno is a 79 y.o. female seen for a follow up visit regarding the following:     Chief Complaint   Patient presents with    Results     Nuke/echo/labs    Shortness of Breath            HPI:  Follow up  Results (Nuke/echo/labs) and Shortness of Breath   . Follow up afib, highly symptomatic when in afib and ultimately had DCPM with reactive ATP for AF. Sudden weight gain with dyspnea last visit, added chlorthalidone with consideration of possible long term BB effect with labs, echo and stress test.  Her NT-pro BNP was not run, (the only order available in the system was BNP, simply not resulted, no notification from lab that it wasn't going to be done--have put  on this)  other labs were ok. Nuke and echo looked well. She was also being evaluated and treated for GI infection at the time    She's lost 9 lbs on her home scale, put back on a couple but overall better. She had a duodenal obstruction dilated by her GI, continues to struggle with all that. Having more afib on device checks and will be seeing Dr Ghazal Sandoval one month from today, suspect they will be considering pace/ablate given that she remains highly symptomatic when in afib. Was noted to be in it last night with symptoms of fatigue, dyspnea and 2 lb weight gain. Past cardiac history:   prior thrombotic occlusion of the LAD. No obstructive disease at cath 2002   Aug 2013: Cath no obstructive CAD   2016 - EF 60-65%. pseudonormal diastolic function, RVSP 43   2017 - Lexiscan MPI - normal EKG, perfusion and LVEF   2017 - 24 hour  Monitor - NSR, occasional PAC, 2-3 episodes brief atrial tachycardia, longest 14 beats, asymptomatic.  2 episodes AIVR during sleep, rate 64, longest 4 beats.   No diary entries   Oct 2021- afib   Oct 2021- Echo - EF 50-55%, mild to mod MR, bi atrial enlargement, mild RVE, RVSP 44-no significant change from previous   10/28/21 - successful DCCV but with early return to AF   Mar 2022- DCPM for symptomatic lenora with reactive ATP for AF (Dr Heather Monteiro)  Aug 2022        Echo - normal SF, ind DF d/t afib (E/e' 9), WEI, normal RVSP, no significant valve disease          Key CAD CHF Meds            chlorthalidone (HYGROTON) 25 MG tablet (Taking)    Sig - Route: Take 1 tablet by mouth in the morning. - Oral    losartan (COZAAR) 100 MG tablet (Taking)    Sig - Route: Take 1 tablet by mouth in the morning. - Oral    metoprolol succinate (TOPROL XL) 100 MG extended release tablet (Taking)    Sig - Route: Take 1 tablet by mouth in the morning. - Oral    apixaban (ELIQUIS) 5 MG TABS tablet (Taking)    Sig - Route: Take 1 tablet by mouth 2 times daily - Oral    Class: Phone In              Past Medical History, Past Surgical History, Family history, Social History, and Medications were all reviewed with the patient today and updated as necessary. Prior to Admission medications    Medication Sig Start Date End Date Taking? Authorizing Provider   dicyclomine (BENTYL) 10 MG capsule 1 cap po Q8H PRN Diarrhea and cramps. Max daily amount 30 mg 8/11/22  Yes Olayinka Foreman MD   chlorthalidone (HYGROTON) 25 MG tablet Take 1 tablet by mouth in the morning. 8/10/22  Yes Maye Luna MD   isosorbide mononitrate (IMDUR) 30 MG extended release tablet Take 1 tablet by mouth in the morning. TAKE 1 TABLET BY MOUTH EVERY MORNING. 8/10/22  Yes Maye Luna MD   losartan (COZAAR) 100 MG tablet Take 1 tablet by mouth in the morning. 8/10/22  Yes Maye Luna MD   metoprolol succinate (TOPROL XL) 100 MG extended release tablet Take 1 tablet by mouth in the morning.  8/10/22  Yes Maye Luna MD   apixaban (ELIQUIS) 5 MG TABS tablet Take 1 tablet by mouth 2 times daily 7/8/22  Yes Rohan Wan MD   oxybutynin (DITROPAN-XL) 10 MG extended release tablet Take 1 tablet by mouth 2 times daily 6/14/22  Yes GREGORIA Hadley NP   methenamine (HIPREX) 1 g tablet Take 1 tablet by mouth 2 times daily (with meals) TAKE 1 TABLET BY MOUTH TWICE DAILY WITH MEALS 6/14/22  Yes GREGORIA Hadley NP   acetaminophen (TYLENOL) 650 MG extended release tablet Take 650 mg by mouth every 6 hours as needed   Yes Ar Automatic Reconciliation   bimatoprost (LUMIGAN) 0.01 % SOLN ophthalmic drops Apply 1 drop to eye every evening   Yes Ar Automatic Reconciliation   meloxicam (MOBIC) 7.5 MG tablet Take 7.5-15 mg by mouth daily as needed 12/4/18  Yes Ar Automatic Reconciliation   nitroGLYCERIN (NITROSTAT) 0.4 MG SL tablet DISSOLVE 1 TABLET UNDER TONGUE EVERY 5 MINUTES AS NEEDED FOR CHEST PAIN 6/13/18  Yes Ar Automatic Reconciliation   pantoprazole (PROTONIX) 40 MG tablet 2 times daily 3/14/21  Yes Ar Automatic Reconciliation   sertraline (ZOLOFT) 50 MG tablet Take 50 mg by mouth daily 2/10/22  Yes Ar Automatic Reconciliation   sucralfate (CARAFATE) 1 GM tablet 1 po tid (before meals) 2/10/22  Yes Ar Automatic Reconciliation     Allergies   Allergen Reactions    Albuterol Shortness Of Breath     \"coughing spasms and difficulty breathing\"    Amoxicillin Anaphylaxis and Swelling     Other reaction(s): Lips/Mouth swelling-A    Guaifenesin Swelling     Lips swelling    Iodine Rash     Other reaction(s): Hives/Swelling-A    Ciprofloxacin Other (See Comments) and Nausea Only     Other reaction(s): Joint soreness-I    Dronedarone Angioedema, Diarrhea and Itching     Severe diarrhea, lips felt scorched & gum swelling & generalized itching    Macadamia Nut Oil Hives    Omega-3 Fatty Acids Hives    Sulfa Antibiotics Other (See Comments)     Leg cramps    Sulfamethoxazole-Trimethoprim Other (See Comments)     Past Medical History:   Diagnosis Date    Acute renal failure (ARF) ([facility] [address]     Arrhythmia     Arthritis CAD (coronary artery disease)     clot in coronary artery in 2001 and it was dissolved with no further probelms. Chronic pain     arthritis in knees    Coronary artery disease of native artery of native heart with stable angina pectoris ([facility] [address] 7/25/2013    prior thrombotic occlusion of the LAD. No obstructive disease at cath  2002 Aug 2013: Cath no obstructive CAD Apr 2016 - EF 60-65%. pseudonormal diastolic function, RVSP 43 Jun 2017 - Lexiscan MPI - normal EKG, perfusion and LVEF Oct 2021- Echo - EF 50-55%, mild to mod MR, bi atrial enlargement, mild  RVE, RVSP 44 Aug 2022 - normal perfusion, EF 66%     Dyslipidemia     GERD (gastroesophageal reflux disease)     controlled with med    Glaucoma     History of basal cell cancer     multiple    History of peptic ulcer 2002 and 2014    HTN (hypertension)     controlled with med    Hx of colonic polyps          Macular edema     Followed by [facility]    PUD (peptic ulcer disease)     Sick sinus syndrome ([facility] [address] 6/26/2017 Jun 2017 - 24 hour  Monitor - NSR, occasional PAC, 2-3 episodes brief atrial tachycardia, longest 14 beats, asymptomatic.  2 episodes AIVR during sleep, rate 64, longest 4 beats.   No diary entries Nov 2021- dual chamber Medtronic pacemaker    Staph infection 2018    s/p left great toenail removal     Past Surgical History:   Procedure Laterality Date    BLADDER SUSPENSION  2006    CARDIAC CATHETERIZATION  2001, 2002--2013    no intervention-normal    CATARACT REMOVAL Left 2012    with IOL    CATARACT REMOVAL Right 2007    with IOL    CHOLECYSTECTOMY, [address]    and ERCP    COLONOSCOPY  09/23/2014    COLONOSCOPY      multiple    HYSTERECTOMY (CERVIX STATUS UNKNOWN)  1990    KNEE ARTHROSCOPY Left 1995    knee scope    KNEE ARTHROSCOPY Right 1996    knee scope    KNEE ARTHROSCOPY Right 2007    knee scope    LUMBAR LAMINECTOMY  1978 and 1979    back surg--x 2      PACEMAKER PLACEMENT  12/03/2021    AR CARDIAC SURG PROCEDURE UNLIST  2021    cardioversion x2    TUBAL LIGATION  1997    UPPER GASTROINTESTINAL ENDOSCOPY      multiple     Family History   Problem Relation Age of Onset    Alzheimer's Disease Sister     Delayed Awakening Neg Hx     Post-op Nausea/Vomiting Neg Hx     Emergence Delirium Neg Hx     Post-op Cognitive Dysfunction Neg Hx     Other Neg Hx     Lung Disease Father     Colon Cancer Father     Malig Hypertherm Neg Hx     Pseudochol. Deficiency Neg Hx     Crohn's Disease Mother     Cancer Father         colon     Social History     Tobacco Use    Smoking status: Never    Smokeless tobacco: Never   Substance Use Topics    Alcohol use: Yes     Alcohol/week: 2.5 standard drinks       ROS:    ROS       PHYSICAL EXAM:   /80   Pulse 84   Ht 5' 7\" (1.702 m)   Wt 175 lb 9.6 oz (79.7 kg)   BMI 27.50 kg/m²      Wt Readings from Last 3 Encounters:   08/30/22 175 lb 9.6 oz (79.7 kg)   08/24/22 181 lb (82.1 kg)   08/23/22 181 lb (82.1 kg)     BP Readings from Last 3 Encounters:   08/30/22 134/80   08/24/22 118/66   08/23/22 (!) 144/80     Pulse Readings from Last 3 Encounters:   08/30/22 84   08/24/22 76   08/23/22 78           Physical Exam  Constitutional:       Appearance: Normal appearance. HENT:      Head: Normocephalic and atraumatic. Eyes:      Conjunctiva/sclera: Conjunctivae normal.   Neck:      Vascular: No carotid bruit. Cardiovascular:      Rate and Rhythm: Normal rate and regular rhythm. Heart sounds: Murmur heard. Early systolic murmur is present with a grade of 2/6 at the upper right sternal border. No friction rub. No gallop. Pulmonary:      Effort: No respiratory distress. Breath sounds: No wheezing or rales. Musculoskeletal:         General: No swelling. Cervical back: Neck supple. Skin:     General: Skin is warm and dry. Neurological:      General: No focal deficit present. Mental Status: She is alert.    Psychiatric:         Mood and Affect: Mood normal. Behavior: Behavior normal.       Medical problems and test results were reviewed with the patient today. DATA REVIEW    LIPID PANEL     Lab Results   Component Value Date    CHOL 176 02/10/2022    CHOL 148 02/05/2021    CHOL 171 02/03/2020     Lab Results   Component Value Date    TRIG 82 02/10/2022    TRIG 73 02/05/2021    TRIG 83 02/03/2020     Lab Results   Component Value Date    HDL 64 02/10/2022    HDL 62 02/05/2021    HDL 68 02/03/2020     Lab Results   Component Value Date    LDLCALC 97 02/10/2022    LDLCALC 72 02/05/2021    LDLCALC 86 02/03/2020     Lab Results   Component Value Date    LABVLDL 17 02/03/2020    VLDL 15 02/10/2022    VLDL 14 02/05/2021     No results found for: CHOLHDLRATIO    BMP  Lab Results   Component Value Date/Time     08/10/2022 11:16 AM    K 4.4 08/10/2022 11:16 AM     08/10/2022 11:16 AM    CO2 26 08/10/2022 11:16 AM    BUN 16 08/10/2022 11:16 AM    CREATININE 0.70 08/10/2022 11:16 AM    GLUCOSE 97 08/10/2022 11:16 AM    CALCIUM 9.1 08/10/2022 11:16 AM      Lab Results   Component Value Date    WBC 7.7 08/10/2022    HGB 12.9 08/10/2022    HCT 41.8 08/10/2022    MCV 97.2 08/10/2022     08/10/2022     Lab Results   Component Value Date    TSH 1.510 02/10/2022    YGD3RNZ 1.950 08/10/2022           EKG        CXR/IMAGING        DEVICE INTERROGATION        OUTSIDE RECORDS REVIEW    Records from outside providers have been reviewed and summarized as noted in the HPI, past history and data review sections of this note, and reviewed with patient. .       ASSESSMENT and PLAN    Christella Sandhoff was seen today for results and shortness of breath.     Diagnoses and all orders for this visit:    Sick sinus syndrome [facility])    Pacemaker    Paroxysmal atrial fibrillation ([facility] [address]    Coronary artery disease of native artery of native heart with stable angina pectoris ([facility] [address]    Essential hypertension        IMPRESSION:    Much better with chlorthalidone and tolerating it well with home BP consistently in the 110's/70's. Continues to be plagued with symptomatic afib despite ATP and suspect she will be headed towards pace/ablate. (Ultimately if that's the case, would love to get her off the metoprolol or at least reduce the dose)    No angina, negative ischemic evaluation      Return in about 6 months (around 2/28/2023). Thank you for allowing me to participate in this patient's care. Please call or contact me if there are any questions or concerns regarding the above.       Harriet Caputo MD  08/30/22  10:44 AM

## 2022-09-01 ENCOUNTER — TELEPHONE (OUTPATIENT)
Dept: ORTHOPEDIC SURGERY | Age: 82
End: 2022-09-01

## 2022-09-01 NOTE — TELEPHONE ENCOUNTER
Pt called and said dr Jules Potter said to [facility] this pt  Inj hip.she talk to himat the hosp/asap

## 2022-09-07 ENCOUNTER — OFFICE VISIT (OUTPATIENT)
Dept: ORTHOPEDIC SURGERY | Age: 82
End: 2022-09-07
Payer: MEDICARE

## 2022-09-07 DIAGNOSIS — M70.61 GREATER TROCHANTERIC BURSITIS OF RIGHT HIP: ICD-10-CM

## 2022-09-07 DIAGNOSIS — M47.816 LUMBAR SPONDYLOSIS: ICD-10-CM

## 2022-09-07 DIAGNOSIS — M25.551 RIGHT HIP PAIN: Primary | ICD-10-CM

## 2022-09-07 DIAGNOSIS — M54.50 ACUTE LOW BACK PAIN, UNSPECIFIED BACK PAIN LATERALITY, UNSPECIFIED WHETHER SCIATICA PRESENT: ICD-10-CM

## 2022-09-07 DIAGNOSIS — M16.11 PRIMARY OSTEOARTHRITIS OF RIGHT HIP: ICD-10-CM

## 2022-09-07 PROCEDURE — 1090F PRES/ABSN URINE INCON ASSESS: CPT | Performed by: ORTHOPAEDIC SURGERY

## 2022-09-07 PROCEDURE — G8417 CALC BMI ABV UP PARAM F/U: HCPCS | Performed by: ORTHOPAEDIC SURGERY

## 2022-09-07 PROCEDURE — G8428 CUR MEDS NOT DOCUMENT: HCPCS | Performed by: ORTHOPAEDIC SURGERY

## 2022-09-07 PROCEDURE — 99214 OFFICE O/P EST MOD 30 MIN: CPT | Performed by: ORTHOPAEDIC SURGERY

## 2022-09-07 PROCEDURE — G8400 PT W/DXA NO RESULTS DOC: HCPCS | Performed by: ORTHOPAEDIC SURGERY

## 2022-09-07 PROCEDURE — 1123F ACP DISCUSS/DSCN MKR DOCD: CPT | Performed by: ORTHOPAEDIC SURGERY

## 2022-09-07 PROCEDURE — 1036F TOBACCO NON-USER: CPT | Performed by: ORTHOPAEDIC SURGERY

## 2022-09-07 NOTE — PROGRESS NOTES
Name: Hannah Jay  YOB: 1940  Gender: female  MRN: [id number]    CC:   Chief Complaint   Patient presents with    Hip Pain     Right          HPI:   The pain has been present for several weeks and is becoming worse. It hurts at night when sleeping. There was not an acute injury to the hip. The pain is located over the right side low back, lateral aspect of right groin. She reports that she was having some mild pain in right groin up until a few days ago but this has resolved since she started taking Aleve. It hurts to walk initially and the pain improves after being up for a few minutes. .  The pain does not radiate down the leg. Numbness and tingling are not noted. The patient is not having difficulty putting socks and shoes on. Treatment so far has been anti-inflammatory medications. Review of Systems  As per HPI. Pertinent positives and negatives are addressed with the patient, particularly those related to musculoskeletal concerns. Non-orthopaedic concerns were referred back to the primary care physician. [address]:    Current Outpatient Medications:     dicyclomine (BENTYL) 10 MG capsule, 1 cap po Q8H PRN Diarrhea and cramps. Max daily amount 30 mg, Disp: 30 capsule, Rfl: 1    chlorthalidone (HYGROTON) 25 MG tablet, Take 1 tablet by mouth in the morning., Disp: 90 tablet, Rfl: 3    isosorbide mononitrate (IMDUR) 30 MG extended release tablet, Take 1 tablet by mouth in the morning.  TAKE 1 TABLET BY MOUTH EVERY MORNING., Disp: 90 tablet, Rfl: 3    losartan (COZAAR) 100 MG tablet, Take 1 tablet by mouth in the morning., Disp: 90 tablet, Rfl: 3    metoprolol succinate (TOPROL XL) 100 MG extended release tablet, Take 1 tablet by mouth in the morning., Disp: 90 tablet, Rfl: 3    apixaban (ELIQUIS) 5 MG TABS tablet, Take 1 tablet by mouth 2 times daily, Disp: 180 tablet, Rfl: 3    oxybutynin (DITROPAN-XL) 10 MG extended release tablet, Take 1 tablet by mouth 2 times daily, Disp: 180 tablet, Rfl: 3    methenamine (HIPREX) 1 g tablet, Take 1 tablet by mouth 2 times daily (with meals) TAKE 1 TABLET BY MOUTH TWICE DAILY WITH MEALS, Disp: 180 tablet, Rfl: 3    acetaminophen (TYLENOL) 650 MG extended release tablet, Take 650 mg by mouth every 6 hours as needed, Disp: , Rfl:     bimatoprost (LUMIGAN) 0.01 % SOLN ophthalmic drops, Apply 1 drop to eye every evening, Disp: , Rfl:     meloxicam (MOBIC) 7.5 MG tablet, Take 7.5-15 mg by mouth daily as needed, Disp: , Rfl:     nitroGLYCERIN (NITROSTAT) 0.4 MG SL tablet, DISSOLVE 1 TABLET UNDER TONGUE EVERY 5 MINUTES AS NEEDED FOR CHEST PAIN, Disp: , Rfl:     pantoprazole (PROTONIX) 40 MG tablet, 2 times daily, Disp: , Rfl:     sertraline (ZOLOFT) 50 MG tablet, Take 50 mg by mouth daily, Disp: , Rfl:     sucralfate (CARAFATE) 1 GM tablet, 1 po tid (before meals), Disp: , Rfl:   Allergies   Allergen Reactions    Albuterol Shortness Of Breath     \"coughing spasms and difficulty breathing\"    Amoxicillin Anaphylaxis and Swelling     Other reaction(s): Lips/Mouth swelling-A    Guaifenesin Swelling     Lips swelling    Iodine Rash     Other reaction(s): Hives/Swelling-A    Ciprofloxacin Other (See Comments) and Nausea Only     Other reaction(s): Joint soreness-I    Dronedarone Angioedema, Diarrhea and Itching     Severe diarrhea, lips felt scorched & gum swelling & generalized itching    Macadamia Nut Oil Hives    Omega-3 Fatty Acids Hives    Sulfa Antibiotics Other (See Comments)     Leg cramps    Sulfamethoxazole-Trimethoprim Other (See Comments)     Past Medical History:   Diagnosis Date    Acute renal failure (ARF) ([facility])     Arrhythmia     Arthritis          CAD (coronary artery disease)     clot in coronary artery in 2001 and it was dissolved with no further probelms. Chronic pain     arthritis in knees    Coronary artery disease of native artery of native heart with stable angina pectoris ([facility] [address] 7/25/2013    prior thrombotic occlusion of the LAD.   No obstructive disease at cath  2002 Aug 2013: Cath no obstructive CAD Apr 2016 - EF 60-65%. pseudonormal diastolic function, RVSP 43 Jun 2017 - Lexiscan MPI - normal EKG, perfusion and LVEF Oct 2021- Echo - EF 50-55%, mild to mod MR, bi atrial enlargement, mild  RVE, RVSP 44 Aug 2022 - normal perfusion, EF 66%     Dyslipidemia     GERD (gastroesophageal reflux disease)     controlled with med    Glaucoma     History of basal cell cancer     multiple    History of peptic ulcer 2002 and 2014    HTN (hypertension)     controlled with med    Hx of colonic polyps          Macular edema     Followed by [facility]    PUD (peptic ulcer disease)     Sick sinus syndrome ([address] 6/26/2017 Jun 2017 - 24 hour  Monitor - NSR, occasional PAC, 2-3 episodes brief atrial tachycardia, longest 14 beats, asymptomatic.  2 episodes AIVR during sleep, rate 64, longest 4 beats.   No diary entries Nov 2021- dual chamber Medtronic pacemaker    Staph infection 2018    s/p left great toenail removal     .pmh  Past Surgical History:   Procedure Laterality Date    BLADDER SUSPENSION  2006    CARDIAC CATHETERIZATION  2001, 2002--2013    no intervention-normal    CATARACT REMOVAL Left 2012    with IOL    CATARACT REMOVAL Right 2007    with IOL    CHOLECYSTECTOMY, LAPAROSCOPIC  1999    and ERCP    COLONOSCOPY  09/23/2014    COLONOSCOPY      multiple    HYSTERECTOMY (CERVIX STATUS UNKNOWN)  1990    KNEE ARTHROSCOPY Left 1995    knee scope    KNEE ARTHROSCOPY Right 1996    knee scope    KNEE ARTHROSCOPY Right 2007    knee scope    LUMBAR LAMINECTOMY  1978 and 1979    back surg--x 2      PACEMAKER PLACEMENT  12/03/2021    [place] CARDIAC SURG PROCEDURE UNLIST  2021    cardioversion x2    TUBAL LIGATION  1997    UPPER GASTROINTESTINAL ENDOSCOPY      multiple     Family History   Problem Relation Age of Onset    Alzheimer's Disease Sister     Delayed Awakening Neg Hx     Post-op Nausea/Vomiting Neg Hx     Emergence Delirium Neg Hx     Post-op Cognitive Dysfunction Neg Hx     Other Neg Hx     Lung Disease Father     Colon Cancer Father     Malig Hypertherm Neg Hx     Pseudochol. Deficiency Neg Hx     Crohn's Disease Mother     Cancer Father         colon     Social History     Socioeconomic History    Marital status:      Spouse name: Not on file    Number of children: Not on file    Years of education: Not on file    Highest education level: Not on file   Occupational History    Not on file   Tobacco Use    Smoking status: Never    Smokeless tobacco: Never   Substance and Sexual Activity    Alcohol use: Yes     Alcohol/week: 2.5 standard drinks    Drug use: No    Sexual activity: Not on file   Other Topics Concern    Not on file   Social History Narrative    Not on file     Social Determinants of Health     Financial Resource Strain: Not on file   Food Insecurity: Not on file   Transportation Needs: Not on file   Physical Activity: Not on file   Stress: Not on file   Social Connections: Not on file   Intimate Partner Violence: Not on file   Housing Stability: Not on file                  PHYSICAL EXAMINATION:   The patient is alert and oriented, in no distress. The cervical, thoracic and lumbar spine are without compromising deformity. The upper extremities demonstrate reasonable tone, strength and function bilaterally. The lower extremities are as described below. Circulation is normal with palpable pedal pulses bilaterally and no edema. Skin of the leg is normal with no chronic stasis disease bilaterally. Sensation is intact to light touch bilaterally. Gait is noted to be with a slight trendelenburg and antalgia.   right hip range of motion is 0-90 degrees of flexion and 20 degrees on abduction and adduction. There is 15 degrees internal rotation and 25 degrees of external rotation with no pain in groin. There is tenderness to palpation over right greater trochanter.     Limb lengths are equal.  Straight leg test is negative bilaterally. Stability is normal bilaterally. Muscular strength is intact bilaterally. There is no lymphadenopathy in the groin or popliteal regions bilaterally. Their judgment and insight are normal.  They are oriented to time, place and person. Their memory is good and the mood and affect appropriate. XRAYS:   AP pelvis and lateral views of hip are reviewed. There is a mild joint space loss, eburnated bone and osteophyte formation of the hip. X-ray impression: Mild osteoarthritis of the right hip    AP and lateral views of the lumbar spine reveal reveal lumbar DJD. X-ray impression:  Lumbar spine DJD    IMPRESSION:     Diagnosis Orders   1. Right hip pain  XR HIP 2-3 VW W PELVIS RIGHT      2. Acute low back pain, unspecified back pain laterality, unspecified whether sciatica present  XR LUMBAR SPINE (2-3 VIEWS)      3. Lumbar spondylosis        4. Primary osteoarthritis of right hip            RECOMMENDATION:    X-rays were reviewed with the patient today. History and exam findings seem to correlate with greater trochanter bursitis as primary source of pain and it is felt that she would benefit from right greater trochanteric bursa injection today both from a diagnostic and possibly therapeutic standpoint. Procedure Note: After alcohol prep, I placed 40 mg of Kenalog and 2mL of 0.5 % Marcaine into the right hip bursa. Patient tolerated the injection well without complication. Patient will follow up in 4 weeks for recheck. Approximately 30 minutes was spent reviewing the medical record, imaging, performing history and physical examination, discussing the diagnosis and treatment plan with the patient, and completing documentation for this visit. There is a good chance that her issue is related to her spine.   Less likely the slight degenerative changes in her hip without any pain on exam.  We will see her back if she accompanies her  in 3 weeks for his hip follow-up after replacement.   Hopefully should be faring well with her bursa injection

## 2022-09-30 ENCOUNTER — OFFICE VISIT (OUTPATIENT)
Dept: CARDIOLOGY CLINIC | Age: 82
End: 2022-09-30
Payer: MEDICARE

## 2022-09-30 VITALS
DIASTOLIC BLOOD PRESSURE: 88 MMHG | BODY MASS INDEX: 26.21 KG/M2 | HEART RATE: 77 BPM | SYSTOLIC BLOOD PRESSURE: 134 MMHG | HEIGHT: 67 IN | WEIGHT: 167 LBS

## 2022-09-30 DIAGNOSIS — I48.0 PAROXYSMAL ATRIAL FIBRILLATION (HCC): Primary | ICD-10-CM

## 2022-09-30 DIAGNOSIS — I25.118 CORONARY ARTERY DISEASE OF NATIVE ARTERY OF NATIVE HEART WITH STABLE ANGINA PECTORIS (HCC): ICD-10-CM

## 2022-09-30 DIAGNOSIS — I10 ESSENTIAL HYPERTENSION: ICD-10-CM

## 2022-09-30 PROCEDURE — G8428 CUR MEDS NOT DOCUMENT: HCPCS | Performed by: INTERNAL MEDICINE

## 2022-09-30 PROCEDURE — 1090F PRES/ABSN URINE INCON ASSESS: CPT | Performed by: INTERNAL MEDICINE

## 2022-09-30 PROCEDURE — 93000 ELECTROCARDIOGRAM COMPLETE: CPT | Performed by: INTERNAL MEDICINE

## 2022-09-30 PROCEDURE — 99214 OFFICE O/P EST MOD 30 MIN: CPT | Performed by: INTERNAL MEDICINE

## 2022-09-30 PROCEDURE — G8417 CALC BMI ABV UP PARAM F/U: HCPCS | Performed by: INTERNAL MEDICINE

## 2022-09-30 PROCEDURE — G8400 PT W/DXA NO RESULTS DOC: HCPCS | Performed by: INTERNAL MEDICINE

## 2022-09-30 PROCEDURE — 1123F ACP DISCUSS/DSCN MKR DOCD: CPT | Performed by: INTERNAL MEDICINE

## 2022-09-30 PROCEDURE — 1036F TOBACCO NON-USER: CPT | Performed by: INTERNAL MEDICINE

## 2022-09-30 NOTE — PROGRESS NOTES
[facility] CARDIOLOGY  [address] [place] [address], [address] [facility], [address] [place] [address]  PHONE: [phone]        22      NAME:  Anne Rincon  : 1940  MRN: [id number]       Referring Cardiologist: Ginny Henriquez MD and Adia Reeves MD    Reason for Consultation: Atrial fibrillation      ASSESSMENT and PLAN:  Diagnoses and all orders for this visit:      1. AF (paroxysmal atrial fibrillation) ([address]   2. Symptomatic bradycardia s/p DCPM      79year old female with pAF who is highly symptomatic when in AF. She is likely mildly persistent AF. She has an elevated risk of stroke and is on stroke ppx with Eliquis. She is now s/p DCPM for symptomatic bradycardia. She comes in for follow up and she's had recalcitrant AF episodes with persistence of AF over the last several months. She has associated symptoms of fatigue, dyspnea and lightheadedness. -AF - continue Eliquis and metoprolol. Will plan for CRT-P upgrade and AV node ablation. -SSS - s/p DCPM with stable function.    -Restart anti-hypertensives as needed. -EP follow up PRN. -Routine cardiac care per Dr. Barney Tejada. Procedure to be performed: CRT-P upgrade and AV node ablation  Device/ablation Company: ANNE[employer]NAYA  Procedure Date: TBD  Medications to hold, days to hold ([facility], [facility]): Eliquis, 2 days. Anesthesia: MAC     PM/CRT & AVN Ablation  I discussed in detail the potential benefits of PM/CRT therapy with AV mari ablation, including definitive rate control with possible improved mortality, prevention of SCD,  decreased hospitalization, beneficial cardiac remodeling, and prevention of disease progression.  Additionally, I discussed with the patient the potential risks of PM/CRT implantation and subsequent AVN ablation, including the risk of bleeding, infection, venous occlusion, DVT/PE, pneumothorax, cardiac tamponade, perforation, need for urgent open heart surgery, device/lead failure or lead dislodgement with pacemaker dependence, inability to place an LV lead via the coronary sinus, inappropriate shock(s), heart attack, stroke, arrhythmia, radiation skin injury, kidney damage/failure, oversedation, respiratory arrest, and even death. The patient understands these risks in the context of the potential benefits of the device implantation and subsequent ablation, and agrees to proceed. TOTAL TIME: 35 minutes, >50% during counseling and coordination of care       Patient has been instructed and agrees to call our office with any issues or other concerns related to their cardiac condition(s) and/or complaint(s). No follow-up provider specified. Thank you for allowing me to participate in the electrophysiologic care of Ms. Destiney Brown. Please contact me if any questions or concerns were to arise. Neyda Prieto. Evgeny FERRARA, MS  Clinical Cardiac Electrophysiology  [facility] Cardiology  09/30/22  1:36 PM    ===================================================================  Chief Complant:    Chief Complaint   Patient presents with    Coronary Artery Disease    Irregular Heart Beat        Consultation is requested by Dhara Simeon MD for evaluation of Coronary Artery Disease and Irregular Heart Beat    History:  Nichole Ng is a most pleasant 79 y.o. female with a past medical and cardiac history significant for drug refractory pAF. She follows with Dr. Sariah Edmond and admitted for cardioversion  for AF with RVR. She feels better today but has a low HR off her medicines. She has had to increase metoprolol and flecainide over the last few weeks. She does report some lightheadedness after her AV node blockers were increased. She is otherwise healthy  without any significant issues. She comes in for follow up. She is now in persistent AF. She has been symptomatic from this. Her reactive ATP algo has failed to terminate her AF.  The patient otherwise denies chest pain, dyspnea, presyncope, syncope or lateralizing symptoms. Cardiac PMH: (Old records have been reviewed and summarized below)   prior thrombotic occlusion of the LAD. No obstructive disease at cath 2002   Aug 2013: Cath no obstructive CAD   Apr 2016 - EF 60-65%. pseudonormal diastolic function, RVSP 43   Jun 2017 - Lexiscan MPI - normal EKG, perfusion and LVEF   Jun 2017 - 24 hour  Monitor - NSR, occasional PAC, 2-3 episodes brief atrial tachycardia, longest 14 beats, asymptomatic.  2 episodes AIVR during sleep, rate 64, longest 4 beats. No diary entries   Oct 2021- afib   Oct 2021- Echo - EF 50-55%, mild to mod MR, bi atrial enlargement, mild RVE, RVSP 44-no significant change from previous   10/28/21 - successful DCCV but with early return to AF       EKG:  (EKG has been independently visualized by me with interpretation below): Atrial fibrillation, normal axis, no ischemia. ECHO: 10/18/2021    LV: Estimated LVEF is 50 - 55%. Normal cavity size and wall thickness. Low normal systolic function. RV: Mildly dilated right ventricle. Moderately reduced systolic function. TV: Mild to moderate tricuspid valve regurgitation is present. Right Ventricular Arterial Pressure (RVSP) is 44 mmHg. Pulmonary hypertension found to be moderate. MV: Mitral valve thickening. Mild to moderate mitral valve regurgitation is present. IVC: Moderately elevated central venous pressure (8 mmHg); IVC diameter is larger than 21 mm and collapses more than 50% with respiration. LA: Moderately dilated left atrium. Left Atrium volume index is 43 mL/m2. RA: Moderately dilated right atrium. IAS: No atrial septal defect present. ECHO: 8/2022    Left Ventricle: Normal left ventricular systolic function with a visually estimated EF of 55 - 60%. Left ventricle size is normal. Normal wall thickness. Normal wall motion. Diastolic function indeterminate in the setting of atrial fibrillation. Average E/e' ratio is 9.47. Left Atrium: Left atrium is moderately dilated. LA Vol Index is  44 ml/m2. Tricuspid Valve: Mild regurgitation. Normal RVSP. The estimated RVSP is 28 mmHg. Right Atrium: Right atrium is moderately dilated. IVC/SVC: IVC size is normal.      Previous Heart Catheterization: n/a       Stress Test: 6/2017   Left Ventricular Size: normal.   Transient Ischemic Dilatation: not present. Gated cine images demonstrates reveals normal myocardial thickening and   wall  motion. Ejection Fraction: 70%     CONCLUSION:   1. Stress EKG: Normal.   2. SPECT Perfusion Imaging: Normal Perfusion. 3. LV Systolic Function is  normal.   4. Risk Assessment:  Normal.     NST: 8/2022    Nuclear Findings: LV perfusion is probably normal.    Nuclear Findings: Normal left ventricular systolic function post-stress. Nuclear Findings: Findings suggest a low risk of myocardial ischemia. ECG: Resting ECG demonstrates atrial fibrillation. Stress Test: A pharmacological stress test was performed using lexiscan. Post-stress ejection fraction is 66%. DEVICE INTERROGATION: MDT[facility]IE [facility], implanted 12/2021. All capture thresholds, lead sensing and impedance measurements are stable and consistent with a normal functioning device. Battery life is stable. Now with persistent atrial fibrillation since earlier this summer. Past Medical History, Past Surgical History, Family history, Social History, and Medications were all reviewed with the patient today and updated as necessary. Current Outpatient Medications   Medication Sig Dispense Refill    dicyclomine (BENTYL) 10 MG capsule 1 cap po Q8H PRN Diarrhea and cramps. Max daily amount 30 mg 30 capsule 1    chlorthalidone (HYGROTON) 25 MG tablet Take 1 tablet by mouth in the morning. 90 tablet 3    isosorbide mononitrate (IMDUR) 30 MG extended release tablet Take 1 tablet by mouth in the morning. TAKE 1 TABLET BY MOUTH EVERY MORNING. 90 tablet 3    losartan (COZAAR) 100 MG tablet Take 1 tablet by mouth in the morning.  90 tablet 3    metoprolol succinate (TOPROL XL) 100 MG extended release tablet Take 1 tablet by mouth in the morning. 90 tablet 3    apixaban (ELIQUIS) 5 MG TABS tablet Take 1 tablet by mouth 2 times daily 180 tablet 3    methenamine (HIPREX) 1 g tablet Take 1 tablet by mouth 2 times daily (with meals) TAKE 1 TABLET BY MOUTH TWICE DAILY WITH MEALS 180 tablet 3    acetaminophen (TYLENOL) 650 MG extended release tablet Take 650 mg by mouth every 6 hours as needed      bimatoprost (LUMIGAN) 0.01 % SOLN ophthalmic drops Apply 1 drop to eye every evening      meloxicam (MOBIC) 7.5 MG tablet Take 7.5-15 mg by mouth daily as needed      nitroGLYCERIN (NITROSTAT) 0.4 MG SL tablet DISSOLVE 1 TABLET UNDER TONGUE EVERY 5 MINUTES AS NEEDED FOR CHEST PAIN      pantoprazole (PROTONIX) 40 MG tablet 2 times daily      sertraline (ZOLOFT) 50 MG tablet Take 50 mg by mouth daily      sucralfate (CARAFATE) 1 GM tablet 1 po tid (before meals)      oxybutynin (DITROPAN-XL) 10 MG extended release tablet Take 1 tablet by mouth 2 times daily (Patient not taking: Reported on 9/30/2022) 180 tablet 3     No current facility-administered medications for this visit.      Allergies   Allergen Reactions    Albuterol Shortness Of Breath     \"coughing spasms and difficulty breathing\"    Amoxicillin Anaphylaxis and Swelling     Other reaction(s): Lips/Mouth swelling-A    Guaifenesin Swelling     Lips swelling    Iodine Rash     Other reaction(s): Hives/Swelling-A    Ciprofloxacin Other (See Comments) and Nausea Only     Other reaction(s): Joint soreness-I    Dronedarone Angioedema, Diarrhea and Itching     Severe diarrhea, lips felt scorched & gum swelling & generalized itching    Macadamia Nut Oil Hives    Omega-3 Fatty Acids Hives    Sulfa Antibiotics Other (See Comments)     Leg cramps    Sulfamethoxazole-Trimethoprim Other (See Comments)       Past Medical History:   Diagnosis Date    Acute renal failure (ARF) (HCC)     Arrhythmia     Arthritis          CAD (coronary artery disease)     clot in coronary artery in 2001 and it was dissolved with no further probelms. Chronic pain     arthritis in knees    Coronary artery disease of native artery of native heart with stable angina pectoris ([facility] [address] 7/25/2013    prior thrombotic occlusion of the LAD. No obstructive disease at cath  2002 Aug 2013: Cath no obstructive CAD Apr 2016 - EF 60-65%. pseudonormal diastolic function, RVSP 43 Jun 2017 - Lexiscan MPI - normal EKG, perfusion and LVEF Oct 2021- Echo - EF 50-55%, mild to mod MR, bi atrial enlargement, mild  RVE, RVSP 44 Aug 2022 - normal perfusion, EF 66%     Dyslipidemia     GERD (gastroesophageal reflux disease)     controlled with med    Glaucoma     History of basal cell cancer     multiple    History of peptic ulcer 2002 and 2014    HTN (hypertension)     controlled with med    Hx of colonic polyps          Macular edema     Followed by [facility]    PUD (peptic ulcer disease)     Sick sinus syndrome ([facility] [address] 6/26/2017 Jun 2017 - 24 hour  Monitor - NSR, occasional PAC, 2-3 episodes brief atrial tachycardia, longest 14 beats, asymptomatic.  2 episodes AIVR during sleep, rate 64, longest 4 beats.   No diary entries Nov 2021- dual chamber Medtronic pacemaker    Staph infection 2018    s/p left great toenail removal     Past Surgical History:   Procedure Laterality Date    BLADDER SUSPENSION  2006    CARDIAC CATHETERIZATION  2001, 2002--2013    no intervention-normal    CATARACT REMOVAL Left 2012    with IOL    CATARACT REMOVAL Right 2007    with IOL    CHOLECYSTECTOMY, LAPAROSCOPIC  1999    and ERCP    COLONOSCOPY  09/23/2014    COLONOSCOPY      multiple    HYSTERECTOMY (CERVIX STATUS UNKNOWN)  1990    KNEE ARTHROSCOPY Left 1995    knee scope    KNEE ARTHROSCOPY Right 1996    knee scope    KNEE ARTHROSCOPY Right 2007    knee scope    LUMBAR LAMINECTOMY  1978 and 1979    back surg--x 2      PACEMAKER PLACEMENT  12/03/2021    GA CARDIAC SURG PROCEDURE UNLIST  2021    cardioversion x2    TUBAL LIGATION  1997    UPPER GASTROINTESTINAL ENDOSCOPY      multiple     Family History   Problem Relation Age of Onset    Alzheimer's Disease Sister     Delayed Awakening Neg Hx     Post-op Nausea/Vomiting Neg Hx     Emergence Delirium Neg Hx     Post-op Cognitive Dysfunction Neg Hx     Other Neg Hx     Lung Disease Father     Colon Cancer Father     Malig Hypertherm Neg Hx     Pseudochol. Deficiency Neg Hx     Crohn's Disease Mother     Cancer Father         colon     Social History     Tobacco Use    Smoking status: Never    Smokeless tobacco: Never   Substance Use Topics    Alcohol use: Yes     Alcohol/week: 2.5 standard drinks       ROS:  A comprehensive review of systems was performed with the pertinent positives and negatives as noted in the HPI in addition to:  Review of Systems      PHYSICAL EXAM:   /88   Pulse 77   Ht 5' 7\" (1.702 m)   Wt 167 lb (75.8 kg)   BMI 26.16 kg/m²      Wt Readings from Last 3 Encounters:   09/30/22 167 lb (75.8 kg)   08/30/22 175 lb 9.6 oz (79.7 kg)   08/24/22 181 lb (82.1 kg)     BP Readings from Last 3 Encounters:   09/30/22 134/88   08/30/22 134/80   08/24/22 118/66       Gen: Well appearing, well developed, no acute distress  Eyes: Pupils equal, round. Extraocular movements are intact  ENT: Oropharynx clear, no oral lesions, normal dentition  CV: S1S2, IRIR, no murmurs, rubs or gallops, normal JVD, no carotid bruits, normal distal pulses, no WADE, left sided CIED C/D/I. Pulm: Clear to auscultation bilaterally, no accessory muscle uses, no wheezes or rales  GI: Soft, NT, ND, +BS  Neuro: Alert and oriented, nonfocal  Psych: Appropriate affect  Skin: Normal color and skin turgor  MSK: Normal muscle bulk and tone    Medical problems and test results were reviewed with the patient today. No results found for any visits on 09/30/22.

## 2022-10-07 DIAGNOSIS — I10 ESSENTIAL HYPERTENSION: ICD-10-CM

## 2022-10-07 DIAGNOSIS — I25.118 CORONARY ARTERY DISEASE OF NATIVE ARTERY OF NATIVE HEART WITH STABLE ANGINA PECTORIS (HCC): ICD-10-CM

## 2022-10-07 DIAGNOSIS — I48.0 PAROXYSMAL ATRIAL FIBRILLATION (HCC): ICD-10-CM

## 2022-10-07 LAB
ANION GAP SERPL CALC-SCNC: 6 MMOL/L (ref 4–13)
BUN SERPL-MCNC: 20 MG/DL (ref 8–23)
CALCIUM SERPL-MCNC: 9.4 MG/DL (ref 8.3–10.4)
CHLORIDE SERPL-SCNC: 106 MMOL/L (ref 101–110)
CO2 SERPL-SCNC: 29 MMOL/L (ref 21–32)
CREAT SERPL-MCNC: 0.6 MG/DL (ref 0.6–1)
ERYTHROCYTE [DISTWIDTH] IN BLOOD BY AUTOMATED COUNT: 13.9 % (ref 11.9–14.6)
GLUCOSE SERPL-MCNC: 104 MG/DL (ref 65–100)
HCT VFR BLD AUTO: 40.9 % (ref 35.8–46.3)
HGB BLD-MCNC: 13 G/DL (ref 11.7–15.4)
INR PPP: 1.2
MCH RBC QN AUTO: 29.7 PG (ref 26.1–32.9)
MCHC RBC AUTO-ENTMCNC: 31.8 G/DL (ref 31.4–35)
MCV RBC AUTO: 93.6 FL (ref 79.6–97.8)
NRBC # BLD: 0 K/UL (ref 0–0.2)
PLATELET # BLD AUTO: 232 K/UL (ref 150–450)
PMV BLD AUTO: 10.5 FL (ref 9.4–12.3)
POTASSIUM SERPL-SCNC: 4 MMOL/L (ref 3.5–5.1)
PROTHROMBIN TIME: 15.8 SEC (ref 12.6–14.5)
RBC # BLD AUTO: 4.37 M/UL (ref 4.05–5.2)
SODIUM SERPL-SCNC: 141 MMOL/L (ref 136–145)
WBC # BLD AUTO: 8 K/UL (ref 4.3–11.1)

## 2022-10-10 ENCOUNTER — ANESTHESIA EVENT (OUTPATIENT)
Dept: CARDIAC CATH/INVASIVE PROCEDURES | Age: 82
End: 2022-10-10
Payer: MEDICARE

## 2022-10-10 DIAGNOSIS — Z95.0 CARDIAC PACEMAKER IN SITU: Primary | ICD-10-CM

## 2022-10-10 DIAGNOSIS — I49.5 SICK SINUS SYNDROME (HCC): ICD-10-CM

## 2022-10-10 DIAGNOSIS — R00.1 BRADYCARDIA: ICD-10-CM

## 2022-10-11 ENCOUNTER — ANESTHESIA (OUTPATIENT)
Dept: CARDIAC CATH/INVASIVE PROCEDURES | Age: 82
End: 2022-10-11
Payer: MEDICARE

## 2022-10-11 ENCOUNTER — HOSPITAL ENCOUNTER (OUTPATIENT)
Age: 82
Discharge: HOME | End: 2022-10-12
Attending: INTERNAL MEDICINE | Admitting: INTERNAL MEDICINE
Payer: MEDICARE

## 2022-10-11 ENCOUNTER — APPOINTMENT (OUTPATIENT)
Dept: GENERAL RADIOLOGY | Age: 82
End: 2022-10-11
Attending: INTERNAL MEDICINE
Payer: MEDICARE

## 2022-10-11 DIAGNOSIS — I48.91 ATRIAL FIBRILLATION (HCC): ICD-10-CM

## 2022-10-11 DIAGNOSIS — I48.19 PERSISTENT ATRIAL FIBRILLATION (HCC): ICD-10-CM

## 2022-10-11 PROBLEM — Z95.0 S/P BIVENTRICULAR CARDIAC PACEMAKER PROCEDURE: Status: ACTIVE | Noted: 2022-10-11

## 2022-10-11 LAB
ECHO BSA: 1.89 M2
EKG ATRIAL RATE: 80 BPM
EKG ATRIAL RATE: 82 BPM
EKG DIAGNOSIS: NORMAL
EKG DIAGNOSIS: NORMAL
EKG P AXIS: -3 DEGREES
EKG Q-T INTERVAL: 436 MS
EKG Q-T INTERVAL: 446 MS
EKG QRS DURATION: 134 MS
EKG QRS DURATION: 170 MS
EKG QTC CALCULATION (BAZETT): 502 MS
EKG QTC CALCULATION (BAZETT): 511 MS
EKG R AXIS: -44 DEGREES
EKG R AXIS: 53 DEGREES
EKG T AXIS: 14 DEGREES
EKG T AXIS: 40 DEGREES
EKG VENTRICULAR RATE: 79 BPM
EKG VENTRICULAR RATE: 80 BPM

## 2022-10-11 PROCEDURE — 6360000004 HC RX CONTRAST MEDICATION: Performed by: INTERNAL MEDICINE

## 2022-10-11 PROCEDURE — 6360000002 HC RX W HCPCS: Performed by: INTERNAL MEDICINE

## 2022-10-11 PROCEDURE — 2580000003 HC RX 258: Performed by: ANESTHESIOLOGY

## 2022-10-11 PROCEDURE — C2621 PMKR, OTHER THAN SING/DUAL: HCPCS | Performed by: INTERNAL MEDICINE

## 2022-10-11 PROCEDURE — C1760 CLOSURE DEV, VASC: HCPCS | Performed by: INTERNAL MEDICINE

## 2022-10-11 PROCEDURE — 6360000002 HC RX W HCPCS: Performed by: NURSE ANESTHETIST, CERTIFIED REGISTERED

## 2022-10-11 PROCEDURE — 6360000002 HC RX W HCPCS: Performed by: REGISTERED NURSE

## 2022-10-11 PROCEDURE — 33233 REMOVAL OF PM GENERATOR: CPT | Performed by: INTERNAL MEDICINE

## 2022-10-11 PROCEDURE — C1769 GUIDE WIRE: HCPCS | Performed by: INTERNAL MEDICINE

## 2022-10-11 PROCEDURE — 33229 REMV&REPLC PM GEN MULT LEADS: CPT | Performed by: INTERNAL MEDICINE

## 2022-10-11 PROCEDURE — 33225 L VENTRIC PACING LEAD ADD-ON: CPT | Performed by: INTERNAL MEDICINE

## 2022-10-11 PROCEDURE — C1732 CATH, EP, DIAG/ABL, 3D/VECT: HCPCS | Performed by: INTERNAL MEDICINE

## 2022-10-11 PROCEDURE — 2580000003 HC RX 258: Performed by: NURSE ANESTHETIST, CERTIFIED REGISTERED

## 2022-10-11 PROCEDURE — 71045 X-RAY EXAM CHEST 1 VIEW: CPT

## 2022-10-11 PROCEDURE — 2500000003 HC RX 250 WO HCPCS: Performed by: INTERNAL MEDICINE

## 2022-10-11 PROCEDURE — 2580000003 HC RX 258: Performed by: INTERNAL MEDICINE

## 2022-10-11 PROCEDURE — 6370000000 HC RX 637 (ALT 250 FOR IP): Performed by: NURSE PRACTITIONER

## 2022-10-11 PROCEDURE — 2709999900 HC NON-CHARGEABLE SUPPLY: Performed by: INTERNAL MEDICINE

## 2022-10-11 PROCEDURE — 6370000000 HC RX 637 (ALT 250 FOR IP): Performed by: INTERNAL MEDICINE

## 2022-10-11 PROCEDURE — C1894 INTRO/SHEATH, NON-LASER: HCPCS | Performed by: INTERNAL MEDICINE

## 2022-10-11 PROCEDURE — 33208 INSRT HEART PM ATRIAL & VENT: CPT | Performed by: INTERNAL MEDICINE

## 2022-10-11 PROCEDURE — 3700000001 HC ADD 15 MINUTES (ANESTHESIA): Performed by: INTERNAL MEDICINE

## 2022-10-11 PROCEDURE — 93650 ICAR CATH ABLTJ AV NODE FUNC: CPT | Performed by: INTERNAL MEDICINE

## 2022-10-11 PROCEDURE — 93005 ELECTROCARDIOGRAM TRACING: CPT | Performed by: INTERNAL MEDICINE

## 2022-10-11 PROCEDURE — 3700000000 HC ANESTHESIA ATTENDED CARE: Performed by: INTERNAL MEDICINE

## 2022-10-11 PROCEDURE — C1900 LEAD, CORONARY VENOUS: HCPCS | Performed by: INTERNAL MEDICINE

## 2022-10-11 PROCEDURE — C1892 INTRO/SHEATH,FIXED,PEEL-AWAY: HCPCS | Performed by: INTERNAL MEDICINE

## 2022-10-11 PROCEDURE — 2500000003 HC RX 250 WO HCPCS: Performed by: REGISTERED NURSE

## 2022-10-11 DEVICE — LEAD 459888 MRI S-TIP US
Type: IMPLANTABLE DEVICE | Status: FUNCTIONAL
Brand: ATTAIN PERFORMA™ S MRI SURESCAN™

## 2022-10-11 DEVICE — CRTP W4TR03 SOLARA QUAD CRTP MRI US
Type: IMPLANTABLE DEVICE | Status: FUNCTIONAL
Brand: SOLARA™ QUAD CRT-P MRI SURESCAN™

## 2022-10-11 RX ORDER — ACETAMINOPHEN 325 MG/1
650 TABLET ORAL EVERY 4 HOURS PRN
Status: DISCONTINUED | OUTPATIENT
Start: 2022-10-11 | End: 2022-10-11 | Stop reason: HOSPADM

## 2022-10-11 RX ORDER — MAGNESIUM HYDROXIDE/ALUMINUM HYDROXICE/SIMETHICONE 120; 1200; 1200 MG/30ML; MG/30ML; MG/30ML
15 SUSPENSION ORAL EVERY 6 HOURS PRN
Status: DISCONTINUED | OUTPATIENT
Start: 2022-10-11 | End: 2022-10-12 | Stop reason: HOSPADM

## 2022-10-11 RX ORDER — ONDANSETRON 2 MG/ML
4 INJECTION INTRAMUSCULAR; INTRAVENOUS ONCE
Status: DISCONTINUED | OUTPATIENT
Start: 2022-10-11 | End: 2022-10-12 | Stop reason: HOSPADM

## 2022-10-11 RX ORDER — LIDOCAINE HYDROCHLORIDE 10 MG/ML
1 INJECTION, SOLUTION INFILTRATION; PERINEURAL
Status: ACTIVE | OUTPATIENT
Start: 2022-10-11 | End: 2022-10-12

## 2022-10-11 RX ORDER — SODIUM CHLORIDE 9 MG/ML
INJECTION, SOLUTION INTRAVENOUS PRN
Status: DISCONTINUED | OUTPATIENT
Start: 2022-10-11 | End: 2022-10-11

## 2022-10-11 RX ORDER — SODIUM CHLORIDE 0.9 % (FLUSH) 0.9 %
5-40 SYRINGE (ML) INJECTION PRN
Status: DISCONTINUED | OUTPATIENT
Start: 2022-10-11 | End: 2022-10-12 | Stop reason: HOSPADM

## 2022-10-11 RX ORDER — PROPOFOL 10 MG/ML
INJECTION, EMULSION INTRAVENOUS CONTINUOUS PRN
Status: DISCONTINUED | OUTPATIENT
Start: 2022-10-11 | End: 2022-10-11 | Stop reason: SDUPTHER

## 2022-10-11 RX ORDER — ACETAMINOPHEN 325 MG/1
650 TABLET ORAL EVERY 6 HOURS PRN
Status: DISCONTINUED | OUTPATIENT
Start: 2022-10-11 | End: 2022-10-12 | Stop reason: HOSPADM

## 2022-10-11 RX ORDER — LIDOCAINE HYDROCHLORIDE 20 MG/ML
INJECTION, SOLUTION EPIDURAL; INFILTRATION; INTRACAUDAL; PERINEURAL PRN
Status: DISCONTINUED | OUTPATIENT
Start: 2022-10-11 | End: 2022-10-11 | Stop reason: SDUPTHER

## 2022-10-11 RX ORDER — METHENAMINE HIPPURATE 1000 MG/1
1 TABLET ORAL 2 TIMES DAILY WITH MEALS
Status: DISCONTINUED | OUTPATIENT
Start: 2022-10-11 | End: 2022-10-12 | Stop reason: HOSPADM

## 2022-10-11 RX ORDER — DEXTROSE MONOHYDRATE 100 MG/ML
INJECTION, SOLUTION INTRAVENOUS CONTINUOUS PRN
Status: DISCONTINUED | OUTPATIENT
Start: 2022-10-11 | End: 2022-10-11

## 2022-10-11 RX ORDER — DIPHENHYDRAMINE HYDROCHLORIDE 50 MG/ML
INJECTION INTRAMUSCULAR; INTRAVENOUS PRN
Status: DISCONTINUED | OUTPATIENT
Start: 2022-10-11 | End: 2022-10-11 | Stop reason: SDUPTHER

## 2022-10-11 RX ORDER — MIDAZOLAM HYDROCHLORIDE 2 MG/2ML
2 INJECTION, SOLUTION INTRAMUSCULAR; INTRAVENOUS
Status: ACTIVE | OUTPATIENT
Start: 2022-10-11 | End: 2022-10-12

## 2022-10-11 RX ORDER — SODIUM CHLORIDE 0.9 % (FLUSH) 0.9 %
5-40 SYRINGE (ML) INJECTION EVERY 12 HOURS SCHEDULED
Status: DISCONTINUED | OUTPATIENT
Start: 2022-10-11 | End: 2022-10-11

## 2022-10-11 RX ORDER — ACETAMINOPHEN 325 MG/1
650 TABLET ORAL EVERY 4 HOURS PRN
Status: DISCONTINUED | OUTPATIENT
Start: 2022-10-11 | End: 2022-10-11

## 2022-10-11 RX ORDER — METOPROLOL SUCCINATE 100 MG/1
100 TABLET, EXTENDED RELEASE ORAL DAILY
Status: DISCONTINUED | OUTPATIENT
Start: 2022-10-12 | End: 2022-10-12 | Stop reason: HOSPADM

## 2022-10-11 RX ORDER — LATANOPROST 50 UG/ML
1 SOLUTION/ DROPS OPHTHALMIC DAILY
Status: DISCONTINUED | OUTPATIENT
Start: 2022-10-11 | End: 2022-10-12 | Stop reason: HOSPADM

## 2022-10-11 RX ORDER — SODIUM CHLORIDE, SODIUM LACTATE, POTASSIUM CHLORIDE, CALCIUM CHLORIDE 600; 310; 30; 20 MG/100ML; MG/100ML; MG/100ML; MG/100ML
INJECTION, SOLUTION INTRAVENOUS CONTINUOUS
Status: DISCONTINUED | OUTPATIENT
Start: 2022-10-11 | End: 2022-10-12 | Stop reason: HOSPADM

## 2022-10-11 RX ORDER — OXYCODONE HYDROCHLORIDE 5 MG/1
5 TABLET ORAL
Status: DISCONTINUED | OUTPATIENT
Start: 2022-10-11 | End: 2022-10-11

## 2022-10-11 RX ORDER — ISOSORBIDE MONONITRATE 30 MG/1
30 TABLET, EXTENDED RELEASE ORAL DAILY
Status: DISCONTINUED | OUTPATIENT
Start: 2022-10-12 | End: 2022-10-12 | Stop reason: HOSPADM

## 2022-10-11 RX ORDER — HYDROMORPHONE HYDROCHLORIDE 2 MG/ML
0.5 INJECTION, SOLUTION INTRAMUSCULAR; INTRAVENOUS; SUBCUTANEOUS EVERY 10 MIN PRN
Status: DISCONTINUED | OUTPATIENT
Start: 2022-10-11 | End: 2022-10-11

## 2022-10-11 RX ORDER — SODIUM CHLORIDE 0.9 % (FLUSH) 0.9 %
5-40 SYRINGE (ML) INJECTION PRN
Status: DISCONTINUED | OUTPATIENT
Start: 2022-10-11 | End: 2022-10-11

## 2022-10-11 RX ORDER — SODIUM CHLORIDE, SODIUM LACTATE, POTASSIUM CHLORIDE, CALCIUM CHLORIDE 600; 310; 30; 20 MG/100ML; MG/100ML; MG/100ML; MG/100ML
INJECTION, SOLUTION INTRAVENOUS CONTINUOUS
Status: DISCONTINUED | OUTPATIENT
Start: 2022-10-11 | End: 2022-10-11

## 2022-10-11 RX ORDER — OXYCODONE HYDROCHLORIDE 5 MG/1
10 TABLET ORAL EVERY 4 HOURS PRN
Status: DISCONTINUED | OUTPATIENT
Start: 2022-10-11 | End: 2022-10-12 | Stop reason: HOSPADM

## 2022-10-11 RX ORDER — SODIUM CHLORIDE 9 MG/ML
INJECTION, SOLUTION INTRAVENOUS PRN
Status: DISCONTINUED | OUTPATIENT
Start: 2022-10-11 | End: 2022-10-12 | Stop reason: HOSPADM

## 2022-10-11 RX ORDER — POLYETHYLENE GLYCOL 3350 17 G/17G
17 POWDER, FOR SOLUTION ORAL DAILY PRN
Status: DISCONTINUED | OUTPATIENT
Start: 2022-10-11 | End: 2022-10-12 | Stop reason: HOSPADM

## 2022-10-11 RX ORDER — DICYCLOMINE HYDROCHLORIDE 10 MG/1
10 CAPSULE ORAL 3 TIMES DAILY PRN
Status: DISCONTINUED | OUTPATIENT
Start: 2022-10-11 | End: 2022-10-12 | Stop reason: HOSPADM

## 2022-10-11 RX ORDER — SUCRALFATE 1 G/1
1 TABLET ORAL EVERY 6 HOURS SCHEDULED
Status: DISCONTINUED | OUTPATIENT
Start: 2022-10-11 | End: 2022-10-12 | Stop reason: HOSPADM

## 2022-10-11 RX ORDER — SODIUM CHLORIDE 0.9 % (FLUSH) 0.9 %
5-40 SYRINGE (ML) INJECTION EVERY 12 HOURS SCHEDULED
Status: DISCONTINUED | OUTPATIENT
Start: 2022-10-11 | End: 2022-10-12 | Stop reason: HOSPADM

## 2022-10-11 RX ORDER — PANTOPRAZOLE SODIUM 40 MG/1
40 TABLET, DELAYED RELEASE ORAL
Status: DISCONTINUED | OUTPATIENT
Start: 2022-10-11 | End: 2022-10-12 | Stop reason: HOSPADM

## 2022-10-11 RX ORDER — DIPHENHYDRAMINE HYDROCHLORIDE 50 MG/ML
12.5 INJECTION INTRAMUSCULAR; INTRAVENOUS
Status: DISCONTINUED | OUTPATIENT
Start: 2022-10-11 | End: 2022-10-11

## 2022-10-11 RX ORDER — OXYCODONE HYDROCHLORIDE 5 MG/1
5 TABLET ORAL EVERY 4 HOURS PRN
Status: DISCONTINUED | OUTPATIENT
Start: 2022-10-11 | End: 2022-10-12 | Stop reason: HOSPADM

## 2022-10-11 RX ORDER — CHLORTHALIDONE 25 MG/1
25 TABLET ORAL DAILY
Status: DISCONTINUED | OUTPATIENT
Start: 2022-10-11 | End: 2022-10-12 | Stop reason: HOSPADM

## 2022-10-11 RX ORDER — PROCHLORPERAZINE EDISYLATE 5 MG/ML
5 INJECTION INTRAMUSCULAR; INTRAVENOUS
Status: DISCONTINUED | OUTPATIENT
Start: 2022-10-11 | End: 2022-10-11

## 2022-10-11 RX ORDER — ONDANSETRON 2 MG/ML
4 INJECTION INTRAMUSCULAR; INTRAVENOUS EVERY 6 HOURS PRN
Status: DISCONTINUED | OUTPATIENT
Start: 2022-10-11 | End: 2022-10-12 | Stop reason: HOSPADM

## 2022-10-11 RX ORDER — LOSARTAN POTASSIUM 50 MG/1
100 TABLET ORAL DAILY
Status: DISCONTINUED | OUTPATIENT
Start: 2022-10-11 | End: 2022-10-12 | Stop reason: HOSPADM

## 2022-10-11 RX ADMIN — PHENYLEPHRINE HYDROCHLORIDE 1 ML: 10 INJECTION INTRAVENOUS at 13:01

## 2022-10-11 RX ADMIN — PHENYLEPHRINE HYDROCHLORIDE 1 ML: 10 INJECTION INTRAVENOUS at 12:15

## 2022-10-11 RX ADMIN — DIPHENHYDRAMINE HYDROCHLORIDE 25 MG: 50 INJECTION, SOLUTION INTRAMUSCULAR; INTRAVENOUS at 11:32

## 2022-10-11 RX ADMIN — PHENYLEPHRINE HYDROCHLORIDE 1 ML: 10 INJECTION INTRAVENOUS at 12:24

## 2022-10-11 RX ADMIN — SUCRALFATE 1 G: 1 TABLET ORAL at 23:23

## 2022-10-11 RX ADMIN — HYDROCORTISONE SODIUM SUCCINATE 100 MG: 100 INJECTION, POWDER, FOR SOLUTION INTRAMUSCULAR; INTRAVENOUS at 11:33

## 2022-10-11 RX ADMIN — SODIUM CHLORIDE, SODIUM LACTATE, POTASSIUM CHLORIDE, AND CALCIUM CHLORIDE: 600; 310; 30; 20 INJECTION, SOLUTION INTRAVENOUS at 11:25

## 2022-10-11 RX ADMIN — SODIUM CHLORIDE, SODIUM LACTATE, POTASSIUM CHLORIDE, AND CALCIUM CHLORIDE: 600; 310; 30; 20 INJECTION, SOLUTION INTRAVENOUS at 16:08

## 2022-10-11 RX ADMIN — SODIUM CHLORIDE, PRESERVATIVE FREE 5 ML: 5 INJECTION INTRAVENOUS at 20:53

## 2022-10-11 RX ADMIN — PHENYLEPHRINE HYDROCHLORIDE 1 ML: 10 INJECTION INTRAVENOUS at 12:58

## 2022-10-11 RX ADMIN — CEFAZOLIN SODIUM 2000 MG: 100 INJECTION, POWDER, LYOPHILIZED, FOR SOLUTION INTRAVENOUS at 21:34

## 2022-10-11 RX ADMIN — SODIUM CHLORIDE, PRESERVATIVE FREE 5 ML: 5 INJECTION INTRAVENOUS at 20:54

## 2022-10-11 RX ADMIN — ACETAMINOPHEN 650 MG: 325 TABLET ORAL at 15:25

## 2022-10-11 RX ADMIN — PANTOPRAZOLE SODIUM 40 MG: 40 TABLET, DELAYED RELEASE ORAL at 20:53

## 2022-10-11 RX ADMIN — PHENYLEPHRINE HYDROCHLORIDE 1 ML: 10 INJECTION INTRAVENOUS at 12:13

## 2022-10-11 RX ADMIN — ONDANSETRON 4 MG: 2 INJECTION INTRAMUSCULAR; INTRAVENOUS at 15:26

## 2022-10-11 RX ADMIN — Medication 2 G: at 11:44

## 2022-10-11 RX ADMIN — PROPOFOL 120 MCG/KG/MIN: 10 INJECTION, EMULSION INTRAVENOUS at 11:36

## 2022-10-11 RX ADMIN — DIPHENHYDRAMINE HYDROCHLORIDE 25 MG: 50 INJECTION, SOLUTION INTRAMUSCULAR; INTRAVENOUS at 11:46

## 2022-10-11 RX ADMIN — LIDOCAINE HYDROCHLORIDE 50 MG: 20 INJECTION, SOLUTION EPIDURAL; INFILTRATION; INTRACAUDAL; PERINEURAL at 11:36

## 2022-10-11 RX ADMIN — ACETAMINOPHEN 650 MG: 325 TABLET ORAL at 23:33

## 2022-10-11 RX ADMIN — PHENYLEPHRINE HYDROCHLORIDE 1 ML: 10 INJECTION INTRAVENOUS at 11:50

## 2022-10-11 ASSESSMENT — PAIN SCALES - GENERAL
PAINLEVEL_OUTOF10: 0
PAINLEVEL_OUTOF10: 3
PAINLEVEL_OUTOF10: 0
PAINLEVEL_OUTOF10: 5

## 2022-10-11 ASSESSMENT — PAIN DESCRIPTION - LOCATION
LOCATION: BACK
LOCATION: HEAD

## 2022-10-11 ASSESSMENT — ENCOUNTER SYMPTOMS: SHORTNESS OF BREATH: 1

## 2022-10-11 ASSESSMENT — PAIN DESCRIPTION - ORIENTATION
ORIENTATION: MID
ORIENTATION: LOWER

## 2022-10-11 ASSESSMENT — PAIN SCALES - WONG BAKER: WONGBAKER_NUMERICALRESPONSE: 0

## 2022-10-11 ASSESSMENT — PAIN DESCRIPTION - DESCRIPTORS
DESCRIPTORS: ACHING
DESCRIPTORS: ACHING

## 2022-10-11 NOTE — Clinical Note
Contrast: Isovue. Contrast concentration: 370. Amount: 45 mL. Attending Attestation (For Attendings USE Only)...

## 2022-10-11 NOTE — PROGRESS NOTES
Report received from [facility] for continued care following PPM and AVN ablation. Dsg to left chest D&I and right groin site intact with no bleeding or swelling noted. No complaints voiced. Sling intact.

## 2022-10-11 NOTE — Clinical Note
Pocket closed with stratafix, 4-0 v loc and dermabond, covered with a primaseal. Pressure bandage placed

## 2022-10-11 NOTE — Clinical Note
Prepped: right groin and left chest. Site was clipped and prepped. Prepped with: ChloraPrep. Wet prep solution applied at: 10/11/2022 11:40 AM. Wet prep solution dried at: 10/11/2022 11:45 AM. Wet prep elapsed drying time: 5 mins. Patient was draped after wet prep solution dried.

## 2022-10-11 NOTE — PROCEDURES
: Sadi Hinds. [facility], MD     REFERRING: Satnam Pryor MD     Pre-Procedure Diagnosis  1. Persistent atrial fibrillation. 2. SSS  3. NYHA class II symptoms. Procedure Performed  1. Biventricular pacemaker upgrade. 2. Bundle of His recording  3. AV node ablation     Anesthesia: MAC      Estimated Blood Loss: Less than 10 mL          Specimens: * No specimens in log *      Complications: None     Fluoroscopy Time: 6.3 minutes     Contrast: 45 cc     The procedure, indications, risks, benefits, and alternatives were discussed with the patient and family members, who desired to proceed after questions were answered and informed consent was documented. Procedure: After informed consent was obtained, the patient was brought to the Electrophysiology Laboratory in a fasting state and was prepped and draped in sterile fashion. Prophylactic antibiotic was administered 10 minutes prior to skin incision: refer to anesthesia procedural documentation for details. MAC was administered by the anesthesia team with continuous oxygen saturation measurement and blood pressure measurement. A venogram was performed of the LUE and demonstrated a patent axillary and subclavian vein system. Local anesthetic (sensorcaine) was delivered to the left pectoral region and an incision was made parallel to the deltopectoral groove over the chronic scar. The chronic pacemaker was carefully dissected and hemostasis was achieved with [employer] ([employer]). A partial capsulotomy was performed and the pocket was enlarged/revised in order to fit a larger biventricular pacemaker pulse generator. Access x 1 (Micropuncture kit) was obtained in the left axillary vein under ultrasound guidance using a modified Seldinger technique with placement of a long wire down into the RA and advanced below the diaphragm followed by placement of a peel-away sheath over the guidewire.  A [place] sheath was advanced over a J wire and dilator. The dilator was removed and the braided core was advanced into the RA and then RV. The CS was cannulated using the [id number] system. A non-occlusive coronary sinus venogram was then performed and demonstrated a high posterolateral target. An Amplatz wire used for support and the target vein was accessed with the use of inner sheaths. The finder innermost sheath was removed. The left ventricular lead was then advanced with into the target branch over a Whisper wire. Adequate thresholds were obtained with an adequate margin between phrenic stim and loss of capture. The delivery sheaths were then slit or peeled with fluoroscopy demonstrating stable position. The lead was then sutured to the underlying pectoralis fascia using 0-Ethibond sutures around the lead collar. The old device was disconnected and removed from the pocket and existing leads tested via the pacing system analyzer (PSA) demonstrating stable sensing, impedance and pacing thresholds. The lead pins were then cleaned with antibiotic soaked gauze, dried gently, and attached to a new biventricular pacemaker generator. Pins were directly observed to pass the tip electrode, and the ring hex wrench screws were secured, and leads tug tested. The device and leads were gently positioned within the pocket after the pocket was irrigated copiously with a saline antimicrobial solution and [place] powder was placed in the pocket to promote hemostasis. A retention suture was placed. The wound was closed with multiple layers of 3-0 Stratafix suture followed by skin closure with 4-0 V-Loc. Fluoroscopic images were interpreted by me, in multiple projections. Final device position was confirmed by stored fluoroscopic image from device pocket to lead tips in AP projection. AV node ablation: After informed consent was obtained, the patient was brought to the Electrophysiology Laboratory in a fasting state and was prepped and draped in sterile fashion.  The pacemaker was reprogrammed to VVI 40 bpm. Local anesthetic was delivered to the region over the right femoral vein. Access x 1 was obtained under ultrasound guidance using a modified Seldinger technique with placement of a short 8 F sheath into the right femoral vein. A 3.5 mm STSF ablation catheter ([employer]) was then advanced in the right atrium and a 3D electroanatomic map focusing on the [address] was made. The catheter was then advanced to the region of the AV junction and with delivery of RF there the appearance of accelerated junctional beats followed by complete heart block. A 20 minute waiting period was then observed and there was no return of AV conduction. The patient tolerated the procedure well and there were no complications. She was allowed to awake from anesthesia and transferred to the recovery area in stable condition. The device was reprogrammed to VVIR 80 bpm.    Device and Lead Information  Pulse Generator Model #  Serial # Location Implant        Solara Quad CRT-P SureScan   Medtronic  [id number]   Left pectoral 10/11/2022   Lead Model Number  Serial Number Lead position Implant          RA [id number] MDT [id number] [place] 12/03/21         RV      5076- 58 MDT [id number] RV [place] 12/03/21   LV 4598- 88 MDT [id number] CS 10/11/2022     Lead Sensitivity and Threshold  Lead R or P sensing (mv) Threshold (V) Threshold PW (msec) Impedance (ohms) Final output Voltage (V) Final PW (msec)   RA 0.9 (AF) N/A (AF) 0.40 456 2.0 0.40   RV 13.6 1.0  0.4 380 2.0 0.40     LV -- LV1-Can   0.5 0.4 798 1.5 0.40     Bradycardia Settings:  Mal Blinks Mode LRL URL Pace AVD (ms) Sense AVD (ms) Rate Response Mode Switching Mode SW Rate   DDDR 60 130 180 150 On On 171        Explanted:  [id number] SN: [id number] Implanted: 12/03/2021 Explanted: 10/11/22      IMPRESSION: Successful implantation/upgrade of Medtronic biventricular permanent pacemaker (MRI conditional).  Successful AV node ablation. PLAN:  -Overnight observation.  -Routine CIED instructions.  -Device clinic follow up in 1-2 weeks. -Abx at discharge.  -Routine cardiac care per Dr. Bridger Hendricks. Roxie Judge.  Patricio Boggs MD, [facility] Cardiac Electrophysiology  [address] Cardiology

## 2022-10-11 NOTE — PROGRESS NOTES
Pt arrived, ambulated to room with no visible problems, planned Bi V Upgrade/ AV Node aBLATION for Yasmine. Consent signed, Procedure discussed with pt all questions answered voiced understanding. Medications and history discussed with pt.     Pt prepped per orders The patient has a fraility score of 3-MANAGING WELL, based on NO NEED for assistance

## 2022-10-11 NOTE — PROGRESS NOTES
TRANSFER - OUT REPORT:    Verbal report given to Jennie on Rosa M Bruce  being transferred to [facility] for routine post-op       Report consisted of patient's Situation, Background, Assessment and   Recommendations(SBAR). Information from the following report(s) Surgery Report, Intake/Output, MAR, and Recent Results was reviewed with the receiving nurse. Lines:   Peripheral IV 10/11/22 Left;Proximal Antecubital (Active)   Site Assessment Clean, dry & intact 10/11/22 1545   Phlebitis Assessment No symptoms 10/11/22 1545   Infiltration Assessment 0 10/11/22 1545   Alcohol Cap Used No 10/11/22 1545   Dressing Type Transparent 10/11/22 1545        Opportunity for questions and clarification was provided.

## 2022-10-11 NOTE — ANESTHESIA PRE PROCEDURE
Department of Anesthesiology  Preprocedure Note       Name:  Doreen Milligan   Age:  79 y.o.  :  1940                                          MRN:  [id number]         Date:  10/11/2022      Surgeon: Everett Oleary):  Hal Snow MD    Procedure: Procedure(s): Insert PPM biv multi  ABLATION AV NODE    Medications prior to admission:   Prior to Admission medications    Medication Sig Start Date End Date Taking? Authorizing Provider   dicyclomine (BENTYL) 10 MG capsule 1 cap po Q8H PRN Diarrhea and cramps. Max daily amount 30 mg 22   Sylwia Medina MD   chlorthalidone (HYGROTON) 25 MG tablet Take 1 tablet by mouth in the morning. 8/10/22   Casper Mims MD   isosorbide mononitrate (IMDUR) 30 MG extended release tablet Take 1 tablet by mouth in the morning. TAKE 1 TABLET BY MOUTH EVERY MORNING. 8/10/22   Casper Mims MD   losartan (COZAAR) 100 MG tablet Take 1 tablet by mouth in the morning. 8/10/22   Casper Mims MD   metoprolol succinate (TOPROL XL) 100 MG extended release tablet Take 1 tablet by mouth in the morning.  8/10/22   Casper Mims MD   apixaban (ELIQUIS) 5 MG TABS tablet Take 1 tablet by mouth 2 times daily 22   Casper Mims MD   methenamine (HIPREX) 1 g tablet Take 1 tablet by mouth 2 times daily (with meals) TAKE 1 TABLET BY MOUTH TWICE DAILY WITH MEALS 22   Gordy Sandhoff, APRN - NP   acetaminophen (TYLENOL) 650 MG extended release tablet Take 650 mg by mouth every 6 hours as needed    Ar Automatic Reconciliation   bimatoprost (LUMIGAN) 0.01 % SOLN ophthalmic drops Apply 1 drop to eye every evening    Ar Automatic Reconciliation   meloxicam (MOBIC) 7.5 MG tablet Take 7.5-15 mg by mouth daily as needed 18   Ar Automatic Reconciliation   nitroGLYCERIN (NITROSTAT) 0.4 MG SL tablet DISSOLVE 1 TABLET UNDER TONGUE EVERY 5 MINUTES AS NEEDED FOR CHEST PAIN 18   Ar Automatic Reconciliation   pantoprazole (PROTONIX) 40 MG tablet 2 times daily 3/14/21   Ar Automatic Reconciliation   sertraline (ZOLOFT) 50 MG tablet Take 50 mg by mouth daily 2/10/22   Ar Automatic Reconciliation   sucralfate (CARAFATE) 1 GM tablet 1 po tid (before meals) 2/10/22   Ar Automatic Reconciliation       Current medications:    Current Facility-Administered Medications   Medication Dose Route Frequency Provider Last Rate Last Admin    lidocaine 1 % injection 1 mL  1 mL IntraDERmal Once PRN Lena Mi MD        lactated ringers infusion   IntraVENous Continuous Lena Mi MD        sodium chloride flush 0.9 % injection 5-40 mL  5-40 mL IntraVENous 2 times per day Lena Mi MD        sodium chloride flush 0.9 % injection 5-40 mL  5-40 mL IntraVENous PRN Lena Mi MD        0.9 % sodium chloride infusion   IntraVENous PRN Lena Mi MD        midazolam PF (VERSED) injection 2 mg  2 mg IntraVENous Once PRN Lena Mi MD        ceFAZolin (ANCEF) 2000 mg in sterile water 20 mL IV syringe  2,000 mg IntraVENous Q8H Sammi Norman MD        neomycin-polymyxin B (NEOSPORIN) 1 mL in sterile water for irrigation 1,000 mL irrigation   Irrigation Once Sammi Norman MD           Allergies:     Allergies   Allergen Reactions    Albuterol Shortness Of Breath     \"coughing spasms and difficulty breathing\"    Amoxicillin Anaphylaxis and Swelling     Other reaction(s): Lips/Mouth swelling-A    Guaifenesin Swelling     Lips swelling    Iodine Rash     Other reaction(s): Hives/Swelling-A    Ciprofloxacin Other (See Comments) and Nausea Only     Other reaction(s): Joint soreness-I    Dronedarone Angioedema, Diarrhea and Itching     Severe diarrhea, lips felt scorched & gum swelling & generalized itching    Macadamia Nut Oil Hives    Omega-3 Fatty Acids Hives    Sulfa Antibiotics Other (See Comments)     Leg cramps    Sulfamethoxazole-Trimethoprim Other (See Comments)       Problem List:    Patient Active Problem List   Diagnosis Code  Coronary atherosclerosis of native coronary vessel I25.10    Coronary artery disease of native artery of native heart with stable angina pectoris (HCC) I25.118    Symptomatic bradycardia R00.1    Hypokalemia E87.6    Right middle lobe pneumonia J18.9    Sepsis (HCC) A41.9    PUD (peptic ulcer disease) K27.9    Chest pain R07.9    Mild depression F32. 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  Chronic pain     arthritis in knees    Coronary artery disease of native artery of native heart with stable angina pectoris ([facility] [address] 07/25/2013    prior thrombotic occlusion of the LAD. No obstructive disease at cath  2002 Aug 2013: Cath no obstructive CAD Apr 2016 - EF 60-65%.  pseudonormal diastolic function, RVSP 43 Jun 2017 - Lexiscan MPI - normal EKG, perfusion and LVEF Oct 2021- Echo - EF 50-55%, mild to mod MR, bi atrial enlargement, mild  RVE, RVSP 44 Aug 2022 - normal perfusion, EF 66%     Dyslipidemia     GERD (gastroesophageal reflux disease)     controlled with med    Glaucoma     History of basal cell cancer     multiple    History of peptic ulcer 2002 and 2014    HTN (hypertension)     controlled with med    Hx of colonic polyps          Macular edema     Followed by Dr. Prashant Gottlieb, [facility]    PUD (peptic ulcer disease)     Sick sinus syndrome ([address] 06/26/2017 Jun 2017 - 24 hour  Monitor - NSR, occasional PAC, 2-3 episodes brief atrial tachycardia, longest 14 beats, asymptomatic.  2 episodes AIVR during sleep, rate 64, longest 4 beats. No diary entries Nov 2021- dual chamber Medtronic pacemaker    Staph infection 2018    s/p left great toenail removal       Past Surgical History:        Procedure Laterality Date    BLADDER SUSPENSION  2006    CARDIAC CATHETERIZATION  2001, 2002--2013    no intervention-normal    CATARACT REMOVAL Left 2012    with IOL    CATARACT REMOVAL Right 2007    with IOL    CHOLECYSTECTOMY, LAPAROSCOPIC  1999    and ERCP    COLONOSCOPY  09/23/2014    COLONOSCOPY      multiple    HYSTERECTOMY (CERVIX STATUS UNKNOWN)  1990    KNEE ARTHROSCOPY Left 1995    knee scope    KNEE ARTHROSCOPY Right 1996    knee scope    KNEE ARTHROSCOPY Right 2007    knee scope    LUMBAR LAMINECTOMY  1978 and 1979    back surg--x 2      PACEMAKER PLACEMENT  12/03/2021    [facility] CARDIAC SURG PROCEDURE UNLIST  2021    cardioversion x2    TUBAL LIGATION  1997    UPPER GASTROINTESTINAL ENDOSCOPY      multiple       Social History:    Social History     Tobacco Use    Smoking status: Never    Smokeless tobacco: Never   Substance Use Topics    Alcohol use:  Yes     Alcohol/week: 2.5 standard drinks                                Counseling given: Not Answered      Vital Signs (Current):   Vitals:    10/10/22 1315 10/11/22 1006   BP:  129/76   Pulse:  76   Temp:  98.1 °F (36.7 °C)   TempSrc:  Oral   SpO2:  97%   Weight: 167 lb (75.8 kg) 167 lb (75.8 kg)   Height: 5' 7\" (1.702 m) 5' 7\" (1.702 m)                                              BP Readings from Last 3 Encounters:   10/11/22 129/76   09/30/22 134/88   08/30/22 134/80       NPO Status: Time of last liquid consumption: 2100                        Time of last solid consumption: 2100                        Date of last liquid consumption: 10/10/22                        Date of last solid food consumption: 10/10/22    BMI:   Wt Readings from Last 3 Encounters:   10/11/22 167 lb (75.8 kg)   09/30/22 167 lb (75.8 kg)   08/30/22 175 lb 9.6 oz (79.7 kg)     Body mass index is 26.16 kg/m². CBC:   Lab Results   Component Value Date/Time    WBC 8.0 10/07/2022 10:25 AM    RBC 4.37 10/07/2022 10:25 AM    HGB 13.0 10/07/2022 10:25 AM    HCT 40.9 10/07/2022 10:25 AM    MCV 93.6 10/07/2022 10:25 AM    RDW 13.9 10/07/2022 10:25 AM     10/07/2022 10:25 AM       CMP:   Lab Results   Component Value Date/Time     10/07/2022 10:25 AM    K 4.0 10/07/2022 10:25 AM     10/07/2022 10:25 AM    CO2 29 10/07/2022 10:25 AM    BUN 20 10/07/2022 10:25 AM    CREATININE 0.60 10/07/2022 10:25 AM    GFRAA >60 08/10/2022 11:16 AM    AGRATIO 1.4 02/10/2022 10:13 AM    LABGLOM >60 10/07/2022 10:25 AM    GLUCOSE 104 10/07/2022 10:25 AM    PROT 7.4 08/10/2022 11:16 AM    CALCIUM 9.4 10/07/2022 10:25 AM    BILITOT 0.8 08/10/2022 11:16 AM    ALKPHOS 120 08/10/2022 11:16 AM    ALKPHOS 110 02/10/2022 10:13 AM    AST 18 08/10/2022 11:16 AM    ALT 26 08/10/2022 11:16 AM       POC Tests: No results for input(s): POCGLU, POCNA, POCK, POCCL, POCBUN, POCHEMO, POCHCT in the last 72 hours.     Coags:   Lab Results   Component Value Date/Time    PROTIME 15.8 10/07/2022 10:25 AM    INR 1.2 10/07/2022 10:25 AM       HCG (If Applicable): No results found for: PREGTESTUR, PREGSERUM, HCG, HCGQUANT     ABGs: No results found for: PHART, PO2ART, HKU5VEP, JRI6YBT, BEART, T9LFXXXA     Type & Screen (If Applicable):  No results found for: LABABO, LABRH    Drug/Infectious Status (If Applicable):  No results found for: HIV, HEPCAB    COVID-19 Screening (If Applicable):   Lab Results   Component Value Date/Time    COVID19 Not Detected 11/30/2021 10:07 AM    COVID19 Performed 11/30/2021 10:07 AM           Anesthesia Evaluation  Patient summary reviewed and Nursing notes reviewed no history of anesthetic complications:   Airway: Mallampati: I  TM distance: >3 FB   Neck ROM: full  Mouth opening: > = 3 FB   Dental:    (+) lower dentures and upper dentures      Pulmonary: breath sounds clear to auscultation  (+) shortness of breath:                             Cardiovascular:  Exercise tolerance: no interval change, Gets SOB with bouts of Afib  (+) hypertension:, pacemaker: pacemaker, past MI (oldl MI from clot that was dissolved but no stents):, dysrhythmias (Refractory Afib with RVR s/p failed cardioversions and heart block s/p pacer): atrial fibrillation, MONTEZ:, hyperlipidemia        Rhythm: irregular  Rate: normal                 ROS comment: Echo 8/2022- normal LV and RV fxn, no sign valvular abnormalities    Low risk nuclear stress 2022     Neuro/Psych:   (+) depression/anxiety             GI/Hepatic/Renal:   (+) GERD: well controlled, PUD,           Endo/Other:    (+) : arthritis:., .                 Abdominal:             Vascular: negative vascular ROS. Other Findings:           Anesthesia Plan      TIVA     ASA 3       Induction: intravenous. Anesthetic plan and risks discussed with patient and spouse.                         Forrest Maki MD   10/11/2022

## 2022-10-11 NOTE — PROGRESS NOTES
TRANSFER - IN REPORT:    Verbal report received from agnieszka lopez on James Jenkins 88 being received from cath lab recovery for routine progression of care. Report consisted of patients Situation, Background, Assessment and Recommendations(SBAR). Information from the following report(s) Procedure Summary was reviewed. Opportunity for questions and clarification was provided. Assessment completed upon patients arrival to unit and care assumed. Patient received to room 321. Patient connected to monitor and assessment completed. Plan of care reviewed. Patient oriented to room and call light. Patient aware to use call light to communicate any chest pain or needs. Admission skin assessment completed with second RN and reveals the following:   Sacrum and heels intact with no redness noted. L subclaivan site and R groin site s/p surgery. Patient skin is clean, warm, dry, fragile and has scattered bruising. Otherwise, it is intact. Dual skin assessment with Acna Boyd RN.

## 2022-10-11 NOTE — ANESTHESIA POSTPROCEDURE EVALUATION
Department of Anesthesiology  Postprocedure Note    Patient: Juju Hart  MRN: [id number]  YOB: 1940  Date of evaluation: 10/11/2022      Procedure Summary     Date: 10/11/22 Room / Location: [facility] EP/CATH 4 ALL EVENTS / [facility] CARDIAC CATH LAB    Anesthesia Start: 2813 Anesthesia Stop: 1347    Procedures:       Insert PPM biv multi      ABLATION AV NODE Diagnosis:       Persistent atrial fibrillation (HCC)      (Atrial fibrillation ([address] [I48.91])    Providers: Elda Cui MD Responsible Provider: Dax Martin MD    Anesthesia Type: TIVA ASA Status: 3          Anesthesia Type: TIVA    Pilol Phase I: Pillo Score: 10    Pillo Phase II:        Anesthesia Post Evaluation    Patient location during evaluation: PACU  Patient participation: complete - patient participated  Level of consciousness: awake and alert  Airway patency: patent  Nausea: well controlled. Complications: no  Cardiovascular status: acceptable.   Respiratory status: acceptable  Hydration status: stable

## 2022-10-12 VITALS
DIASTOLIC BLOOD PRESSURE: 70 MMHG | WEIGHT: 170.3 LBS | HEART RATE: 70 BPM | HEIGHT: 67 IN | RESPIRATION RATE: 17 BRPM | OXYGEN SATURATION: 96 % | TEMPERATURE: 98.4 F | SYSTOLIC BLOOD PRESSURE: 115 MMHG | BODY MASS INDEX: 26.73 KG/M2

## 2022-10-12 PROCEDURE — 6360000002 HC RX W HCPCS: Performed by: INTERNAL MEDICINE

## 2022-10-12 PROCEDURE — 6370000000 HC RX 637 (ALT 250 FOR IP): Performed by: NURSE PRACTITIONER

## 2022-10-12 PROCEDURE — 99024 POSTOP FOLLOW-UP VISIT: CPT | Performed by: INTERNAL MEDICINE

## 2022-10-12 PROCEDURE — 6370000000 HC RX 637 (ALT 250 FOR IP): Performed by: INTERNAL MEDICINE

## 2022-10-12 PROCEDURE — 2500000003 HC RX 250 WO HCPCS: Performed by: INTERNAL MEDICINE

## 2022-10-12 RX ORDER — LOSARTAN POTASSIUM 50 MG/1
50 TABLET ORAL DAILY
Qty: 30 TABLET | Refills: 3 | Status: SHIPPED | OUTPATIENT
Start: 2022-10-12

## 2022-10-12 RX ORDER — METOPROLOL SUCCINATE 50 MG/1
50 TABLET, EXTENDED RELEASE ORAL DAILY
Qty: 30 TABLET | Refills: 3 | Status: SHIPPED | OUTPATIENT
Start: 2022-10-12

## 2022-10-12 RX ADMIN — ISOSORBIDE MONONITRATE 30 MG: 30 TABLET, EXTENDED RELEASE ORAL at 08:51

## 2022-10-12 RX ADMIN — CHLORTHALIDONE 25 MG: 25 TABLET ORAL at 08:52

## 2022-10-12 RX ADMIN — PANTOPRAZOLE SODIUM 40 MG: 40 TABLET, DELAYED RELEASE ORAL at 05:57

## 2022-10-12 RX ADMIN — LOSARTAN POTASSIUM 100 MG: 50 TABLET, FILM COATED ORAL at 08:51

## 2022-10-12 RX ADMIN — ACETAMINOPHEN 650 MG: 325 TABLET ORAL at 06:00

## 2022-10-12 RX ADMIN — SUCRALFATE 1 G: 1 TABLET ORAL at 05:57

## 2022-10-12 RX ADMIN — CEFAZOLIN SODIUM 2000 MG: 100 INJECTION, POWDER, LYOPHILIZED, FOR SOLUTION INTRAVENOUS at 05:57

## 2022-10-12 RX ADMIN — SUCRALFATE 1 G: 1 TABLET ORAL at 11:52

## 2022-10-12 RX ADMIN — SERTRALINE 50 MG: 50 TABLET, FILM COATED ORAL at 08:51

## 2022-10-12 RX ADMIN — METOPROLOL SUCCINATE 100 MG: 100 TABLET, EXTENDED RELEASE ORAL at 08:52

## 2022-10-12 ASSESSMENT — PAIN DESCRIPTION - DESCRIPTORS: DESCRIPTORS: ACHING

## 2022-10-12 ASSESSMENT — PAIN SCALES - GENERAL
PAINLEVEL_OUTOF10: 0
PAINLEVEL_OUTOF10: 0
PAINLEVEL_OUTOF10: 3

## 2022-10-12 ASSESSMENT — PAIN DESCRIPTION - LOCATION: LOCATION: SHOULDER

## 2022-10-12 ASSESSMENT — PAIN DESCRIPTION - ORIENTATION: ORIENTATION: LEFT

## 2022-10-12 NOTE — CARE COORDINATION
Pt chart reviewed for discharge planning. LMSW met with pt prior to her discharge home today after an overnight observation/out pt admission. Pt was waiting for her spouse and brother to pick her up. Pt lives with spouse and is normally independent with ADL's. No needs/supportive care orders recieved for CM at this time.

## 2022-10-12 NOTE — DISCHARGE INSTRUCTIONS
DEVICE IMPLANT FOLLOWUP INSTRUCTIONS  You will be discharged with an occlusive dressing over the incision site. This dressing will be removed at the 10 -14-day postop site check with the [address]. Your new device will be checked at that visit and all your device teaching will be given. Keep the incision site dry until your follow up. Use a hand-held shower or bath. Do not submerge or soak in pools or tubs for 6 weeks. If you are discharged with a prescription for antibiotics, please take the full prescription until it is gone. After your site check, you may shower and get the incision site wet. You may let soap and water run on the incision and pat dry. Please do not apply creams, lotions or powders on or near the incision. Mild bruising and swelling can be normal after implant and will resolve in a few weeks. Call the office at [phone] if you have any of the following:  -Signs of infection, such as fever over 100 F, drainage from the incision, redness, significant swelling, or warmth at the incision site.  -Significant pain around the site that gets worse. Mild discomfort can be normal.   -Bleeding from the incision site  -Swelling in the arm on the side of the incision site  -Chest pain or shortness of breath     Your device has the capability of transmitting device information from home to our office using a home monitoring system or phone luis a. The information will transmit through a cellular connection outside of your home. Your device data is reviewed during working hours to ensure proper device function. You will be given your equipment and instruction upon your hospital discharge.                                                                       -You will be given a sling to wear upon discharge with instructions when it should be worn.    -Do not lift the affected arm above the shoulder level, on the side the device was put in, for 4 weeks.   -Do not lift or push more than 5 to 10 pounds for 4 weeks on the affected side. Walking is fine and encouraged.   -Driving is restricted for 5-7 days. Long travel is not recommended until after your site check.    -Do not do any vigorous upper body activities, such as golfing, bowling tennis or mowing for about 6 weeks. -If you work, you may return to work. However, with limitations on lifting for 4 weeks.   -Please avoid any dental work or invasive procedures for 6 weeks, if possible, after implant. Please avoid strong magnetic fields, large power plant transformers and arc welders. Please stay 10 feet away of electromagnetic fields such as working over a large running engine, stereo speakers and large stereo systems. Home appliances are safe. You may operate any electrical or battery-operated device in your home. Modern Devices are seldom affected by normally operating home appliances, such as microwave ovens. Flying is safe and airport metal detectors will not affect your device, although your device may occasionally set off the metal detectors. If this happens, show the  your identification card. Security wanding over the device is contraindicated. Cellular Phones should be used with the hand opposite to the side where the device was implanted. The phone should not be carried in the pocket on the side of the device. CDL licenses are not renewed if you have a defibrillator implanted. Radiation Therapy should be avoided on or near the device. Should you require surgery in the future; some electrosurgical devices can interfere with the device function. You should discuss this with your surgeon prior to any operation. If you are ordered an MRI, Please contact the clinic prior to ensure you have an MRI safe device. You must wait 6 weeks after implant before you may have an MRI. Tens units are contraindicated. [address] [place] [phone]

## 2022-10-12 NOTE — DISCHARGE SUMMARY
[facility] Discharge Summary     Patient ID:  Cole Perdue  [id number]  87 y.o.  1940    Admit date: 10/11/2022    Discharge date:  10/12/2022    Admitting Physician: Fili Billings MD     Discharge Physician: Dr. Lance Pruett    Admission Diagnoses: Atrial fibrillation ([address] [I48.91]  S/P biventricular cardiac pacemaker procedure [Z95.0]    Discharge Diagnoses:   Patient Active Problem List    Diagnosis    S/P biventricular cardiac pacemaker procedure    Atrial fibrillation [facility])     Cardiology Procedures this admission:   biventricular PM implantation, AV node ablation, CXR  Consults: none    Hospital Course: Patient was seen at the office of [facility] by Dr. Abigail Prader for management of uncontrolled AFIB and was subsequently scheduled for an AM Admission PM implantation at [facility] on 10/11/22. Patient was taken to the EP lab and underwent successful implantation of Medtronic biventricular PM followed by AV node ablation by Dr. Abigail Prader. Patient tolerated the procedure well and was taken to the telemetry floor for recovery. Follow up chest xray showed no pneumothorax. The following morning patient was up feeling well without any complaints of chest pain, shortness of breath, or palpitations. Patient's left subclavian and right groin cath sites were clean, dry and intact without hematoma. Patient's labs were stable. Device interrogation showed normal function. Patient was seen and examined by Dr. Lance Pruett and determined stable and ready for discharge. Patient was instructed on the importance of medication compliance and outpatient follow up. Patient has been scheduled for follow-up with [facility] -- device clinic on Oct 25th at 2:45 pm in [facility]. DISPOSITION: Patient has been instructed to keep affected arm below shoulder level for the next 4 weeks or until cleared by doctor. The arm sling should be worn while sleeping. The dressing will be removed at follow-up. NOT CHANGED    Details   dicyclomine (BENTYL) 10 MG capsule 1 cap po Q8H PRN Diarrhea and cramps. Max daily amount 30 mg  Qty: 30 capsule, Refills: 1      chlorthalidone (HYGROTON) 25 MG tablet Take 1 tablet by mouth in the morning. Qty: 90 tablet, Refills: 3      isosorbide mononitrate (IMDUR) 30 MG extended release tablet Take 1 tablet by mouth in the morning. TAKE 1 TABLET BY MOUTH EVERY MORNING.   Qty: 90 tablet, Refills: 3      apixaban (ELIQUIS) 5 MG TABS tablet Take 1 tablet by mouth 2 times daily  Qty: 180 tablet, Refills: 3      methenamine (HIPREX) 1 g tablet Take 1 tablet by mouth 2 times daily (with meals) TAKE 1 TABLET BY MOUTH TWICE DAILY WITH MEALS  Qty: 180 tablet, Refills: 3    Associated Diagnoses: Recurrent UTI      acetaminophen (TYLENOL) 650 MG extended release tablet Take 650 mg by mouth every 6 hours as needed      bimatoprost (LUMIGAN) 0.01 % SOLN ophthalmic drops Apply 1 drop to eye every evening      meloxicam (MOBIC) 7.5 MG tablet Take 7.5-15 mg by mouth daily as needed      nitroGLYCERIN (NITROSTAT) 0.4 MG SL tablet DISSOLVE 1 TABLET UNDER TONGUE EVERY 5 MINUTES AS NEEDED FOR CHEST PAIN      pantoprazole (PROTONIX) 40 MG tablet 2 times daily      sertraline (ZOLOFT) 50 MG tablet Take 50 mg by mouth daily      sucralfate (CARAFATE) 1 GM tablet 1 po tid (before meals)

## 2022-10-21 ENCOUNTER — OFFICE VISIT (OUTPATIENT)
Dept: FAMILY MEDICINE CLINIC | Facility: CLINIC | Age: 82
End: 2022-10-21
Payer: MEDICARE

## 2022-10-21 VITALS
BODY MASS INDEX: 26.47 KG/M2 | DIASTOLIC BLOOD PRESSURE: 72 MMHG | WEIGHT: 169 LBS | OXYGEN SATURATION: 97 % | HEART RATE: 75 BPM | SYSTOLIC BLOOD PRESSURE: 112 MMHG | TEMPERATURE: 97.3 F

## 2022-10-21 DIAGNOSIS — Z09 HOSPITAL DISCHARGE FOLLOW-UP: Primary | ICD-10-CM

## 2022-10-21 DIAGNOSIS — K21.9 GASTROESOPHAGEAL REFLUX DISEASE WITHOUT ESOPHAGITIS: ICD-10-CM

## 2022-10-21 DIAGNOSIS — F32.A ANXIETY AND DEPRESSION: ICD-10-CM

## 2022-10-21 DIAGNOSIS — F41.9 ANXIETY AND DEPRESSION: ICD-10-CM

## 2022-10-21 DIAGNOSIS — M15.9 PRIMARY OSTEOARTHRITIS INVOLVING MULTIPLE JOINTS: ICD-10-CM

## 2022-10-21 DIAGNOSIS — Z23 FLU VACCINE NEED: ICD-10-CM

## 2022-10-21 DIAGNOSIS — I10 PRIMARY HYPERTENSION: ICD-10-CM

## 2022-10-21 PROCEDURE — G8400 PT W/DXA NO RESULTS DOC: HCPCS | Performed by: FAMILY MEDICINE

## 2022-10-21 PROCEDURE — G8417 CALC BMI ABV UP PARAM F/U: HCPCS | Performed by: FAMILY MEDICINE

## 2022-10-21 PROCEDURE — 1123F ACP DISCUSS/DSCN MKR DOCD: CPT | Performed by: FAMILY MEDICINE

## 2022-10-21 PROCEDURE — G8484 FLU IMMUNIZE NO ADMIN: HCPCS | Performed by: FAMILY MEDICINE

## 2022-10-21 PROCEDURE — 99214 OFFICE O/P EST MOD 30 MIN: CPT | Performed by: FAMILY MEDICINE

## 2022-10-21 PROCEDURE — 90694 VACC AIIV4 NO PRSRV 0.5ML IM: CPT | Performed by: FAMILY MEDICINE

## 2022-10-21 PROCEDURE — 1036F TOBACCO NON-USER: CPT | Performed by: FAMILY MEDICINE

## 2022-10-21 PROCEDURE — G8427 DOCREV CUR MEDS BY ELIG CLIN: HCPCS | Performed by: FAMILY MEDICINE

## 2022-10-21 PROCEDURE — 1090F PRES/ABSN URINE INCON ASSESS: CPT | Performed by: FAMILY MEDICINE

## 2022-10-21 PROCEDURE — G0008 ADMIN INFLUENZA VIRUS VAC: HCPCS | Performed by: FAMILY MEDICINE

## 2022-10-21 ASSESSMENT — PATIENT HEALTH QUESTIONNAIRE - PHQ9
2. FEELING DOWN, DEPRESSED OR HOPELESS: 0
5. POOR APPETITE OR OVEREATING: 0
7. TROUBLE CONCENTRATING ON THINGS, SUCH AS READING THE NEWSPAPER OR WATCHING TELEVISION: 0
4. FEELING TIRED OR HAVING LITTLE ENERGY: 0
3. TROUBLE FALLING OR STAYING ASLEEP: 0
6. FEELING BAD ABOUT YOURSELF - OR THAT YOU ARE A FAILURE OR HAVE LET YOURSELF OR YOUR FAMILY DOWN: 0
9. THOUGHTS THAT YOU WOULD BE BETTER OFF DEAD, OR OF HURTING YOURSELF: 0
SUM OF ALL RESPONSES TO PHQ QUESTIONS 1-9: 0
1. LITTLE INTEREST OR PLEASURE IN DOING THINGS: 0
10. IF YOU CHECKED OFF ANY PROBLEMS, HOW DIFFICULT HAVE THESE PROBLEMS MADE IT FOR YOU TO DO YOUR WORK, TAKE CARE OF THINGS AT HOME, OR GET ALONG WITH OTHER PEOPLE: 0
SUM OF ALL RESPONSES TO PHQ QUESTIONS 1-9: 0
SUM OF ALL RESPONSES TO PHQ9 QUESTIONS 1 & 2: 0
SUM OF ALL RESPONSES TO PHQ QUESTIONS 1-9: 0
SUM OF ALL RESPONSES TO PHQ QUESTIONS 1-9: 0
8. MOVING OR SPEAKING SO SLOWLY THAT OTHER PEOPLE COULD HAVE NOTICED. OR THE OPPOSITE, BEING SO FIGETY OR RESTLESS THAT YOU HAVE BEEN MOVING AROUND A LOT MORE THAN USUAL: 0

## 2022-10-21 ASSESSMENT — ENCOUNTER SYMPTOMS
EYES NEGATIVE: 1
RESPIRATORY NEGATIVE: 1
GASTROINTESTINAL NEGATIVE: 1

## 2022-10-21 NOTE — PROGRESS NOTES
HISTORY OF PRESENT ILLNESS    Telma Wilkerson is a 79 y.o. female. HPI  Chief Complaint   Patient presents with    Follow-up     Pace maker      See above. Admitted overnight 10 days ago to adjust pacemaker and for ablation due to atrial fib. Had been having excessive fatigue and SOB. This has resolved since procedure. Has follow up next week for bandage removal.  No side effects from BP medication. No headache or vision change. Denies chest pain or SOB. No swelling. GERD symptoms are controlled. No side effects. No difficulty swallowing. No night time symptoms. BMs are normal.  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 Responds to prn Mobic. Denies pain at present. Anxiety and depression are in remission on Zoloft; no side effects. Current Outpatient Medications on File Prior to Visit   Medication Sig Dispense Refill    losartan (COZAAR) 50 MG tablet Take 1 tablet by mouth daily 30 tablet 3    metoprolol succinate (TOPROL XL) 50 MG extended release tablet Take 1 tablet by mouth daily 30 tablet 3    dicyclomine (BENTYL) 10 MG capsule 1 cap po Q8H PRN Diarrhea and cramps. Max daily amount 30 mg 30 capsule 1    chlorthalidone (HYGROTON) 25 MG tablet Take 1 tablet by mouth in the morning. 90 tablet 3    isosorbide mononitrate (IMDUR) 30 MG extended release tablet Take 1 tablet by mouth in the morning. TAKE 1 TABLET BY MOUTH EVERY MORNING.  90 tablet 3    apixaban (ELIQUIS) 5 MG TABS tablet Take 1 tablet by mouth 2 times daily 180 tablet 3    methenamine (HIPREX) 1 g tablet Take 1 tablet by mouth 2 times daily (with meals) TAKE 1 TABLET BY MOUTH TWICE DAILY WITH MEALS 180 tablet 3    acetaminophen (TYLENOL) 650 MG extended release tablet Take 650 mg by mouth every 6 hours as needed      bimatoprost (LUMIGAN) 0.01 % SOLN ophthalmic drops Apply 1 drop to eye every evening      meloxicam (MOBIC) 7.5 MG tablet Take 7.5-15 mg by mouth daily as needed      nitroGLYCERIN (NITROSTAT) 0.4 MG SL tablet DISSOLVE 1 TABLET UNDER TONGUE EVERY 5 MINUTES AS NEEDED FOR CHEST PAIN      pantoprazole (PROTONIX) 40 MG tablet 2 times daily      sertraline (ZOLOFT) 50 MG tablet Take 50 mg by mouth daily      sucralfate (CARAFATE) 1 GM tablet 1 po tid (before meals)       No current facility-administered medications on file prior to visit. Past Medical History:   Diagnosis Date    Acute renal failure (ARF) ([facility]     Arrhythmia     Arthritis          CAD (coronary artery disease)     clot in coronary artery in 2001 and it was dissolved with no further probelms. Chronic pain     arthritis in knees    Coronary artery disease of native artery of native heart with stable angina pectoris ([facility] 07/25/2013    prior thrombotic occlusion of the LAD. No obstructive disease at cath  2002 Aug 2013: Cath no obstructive CAD Apr 2016 - EF 60-65%. pseudonormal diastolic function, RVSP 43 Jun 2017 - Lexiscan MPI - normal EKG, perfusion and LVEF Oct 2021- Echo - EF 50-55%, mild to mod MR, bi atrial enlargement, mild  RVE, RVSP 44 Aug 2022 - normal perfusion, EF 66%     Dyslipidemia     GERD (gastroesophageal reflux disease)     controlled with med    Glaucoma     History of basal cell cancer     multiple    History of peptic ulcer 2002 and 2014    HTN (hypertension)     controlled with med    Hx of colonic polyps          Macular edema     Followed by [facility]    PUD (peptic ulcer disease)     Sick sinus syndrome ([facility] 06/26/2017 Jun 2017 - 24 hour  Monitor - NSR, occasional PAC, 2-3 episodes brief atrial tachycardia, longest 14 beats, asymptomatic.  2 episodes AIVR during sleep, rate 64, longest 4 beats. No diary entries Nov 2021- dual chamber Medtronic pacemaker    Staph infection 2018    s/p left great toenail removal       Review of Systems   Constitutional: Negative. HENT: Negative. Eyes: Negative. Respiratory: Negative. Cardiovascular: Negative. Gastrointestinal: Negative. Genitourinary: Negative. Musculoskeletal:  Positive for arthralgias (stable at present). Neurological: Negative. Psychiatric/Behavioral:  Negative for dysphoric mood (controlled). The patient is not nervous/anxious (controlled). Blood pressure 112/72, pulse 75, temperature 97.3 °F (36.3 °C), temperature source Temporal, weight 169 lb (76.7 kg), SpO2 97 %, not currently breastfeeding. Physical Exam  Vitals and nursing note reviewed. Constitutional:       Appearance: Normal appearance. HENT:      Mouth/Throat:      Mouth: Mucous membranes are moist.      Pharynx: Oropharynx is clear. Eyes:      Conjunctiva/sclera: Conjunctivae normal.   Neck:      Vascular: No carotid bruit. Comments: No JVD  Cardiovascular:      Rate and Rhythm: Normal rate and regular rhythm. Heart sounds: Normal heart sounds. Pulmonary:      Breath sounds: Normal breath sounds. Musculoskeletal:         General: No swelling. Cervical back: Neck supple. Lymphadenopathy:      Cervical: No cervical adenopathy. Neurological:      General: No focal deficit present. Deep Tendon Reflexes: Reflexes normal.   Psychiatric:         Mood and Affect: Mood normal.        ASSESSMENT and Luba Gutierrez was seen today for follow-up. Diagnoses and all orders for this visit:    Hospital discharge follow-up    Primary hypertension    Gastroesophageal reflux disease without esophagitis    Anxiety and depression    Primary osteoarthritis involving multiple joints    Flu vaccine need  -     Influenza, FLUAD, (age 72 y+), IM, Preservative Free, 0.5 mL   Discussed history and medications with patient. Continue current measures. Follow up if side effects occur or if new symptoms develop. Update immunization. Return in about 6 months (around 4/21/2023), or if symptoms worsen or fail to improve.     Ferdinand Sosa MD

## 2022-11-03 DIAGNOSIS — Z95.0 PACEMAKER: Primary | ICD-10-CM

## 2022-11-03 DIAGNOSIS — R00.1 BRADYCARDIA: ICD-10-CM

## 2022-11-15 DIAGNOSIS — I48.91 ATRIAL FIBRILLATION (HCC): ICD-10-CM

## 2022-11-15 DIAGNOSIS — R00.1 BRADYCARDIA: ICD-10-CM

## 2022-11-15 DIAGNOSIS — Z95.0 PACEMAKER: Primary | ICD-10-CM

## 2022-11-27 RX ORDER — ISOSORBIDE MONONITRATE 30 MG/1
TABLET, EXTENDED RELEASE ORAL
Qty: 90 TABLET | Refills: 3 | Status: SHIPPED | OUTPATIENT
Start: 2022-11-27

## 2022-12-13 ENCOUNTER — PATIENT MESSAGE (OUTPATIENT)
Dept: FAMILY MEDICINE CLINIC | Facility: CLINIC | Age: 82
End: 2022-12-13

## 2022-12-13 NOTE — TELEPHONE ENCOUNTER
From: [employer] Solder  To: Dr. Ailyn Santos  Sent: 2022 11:08 AM EST  Subject: this is an urgent message    Dr. Braulio Mckeon, I have the medical power of  for my sister-in-law Tung Zepeda  10/5/1947. As you are aware she has been suffering from Alzheimers for over 6 years. Recently she   had a bout of serious diarrhea which left her weak. Than two  weeks ago she had a case of food poisoning which she has not recovered from. I arrived yesterday to see her after my. absence for several weeks. She is non responsive, barely voids and only  on occasion opens her eyes. This morning she is less responsive' and I can her her respirations. Will you refer a Hospice nurse to evaluate her ASAP? She would stay at home but needs a hospital bed. Thank you.  Emilia Coreas

## 2022-12-27 ENCOUNTER — TELEPHONE (OUTPATIENT)
Dept: CARDIOLOGY CLINIC | Age: 82
End: 2022-12-27

## 2022-12-27 NOTE — TELEPHONE ENCOUNTER
Patient called with questions regarding being able to go to the dentist. She also wanted to ask about using a new warming foot massager that she received for [place] with her pacemaker.

## 2023-01-10 DIAGNOSIS — Z95.0 PACEMAKER: Primary | ICD-10-CM

## 2023-01-10 DIAGNOSIS — I49.5 SICK SINUS SYNDROME (HCC): ICD-10-CM

## 2023-01-16 ENCOUNTER — TELEPHONE (OUTPATIENT)
Dept: CARDIOLOGY CLINIC | Age: 83
End: 2023-01-16

## 2023-01-16 NOTE — TELEPHONE ENCOUNTER
Patient having EGD/ Colonoscopy on TBD with TBD. Requesting Eliquis hold for 2 days prior.  Fax: [phone]

## 2023-01-18 NOTE — TELEPHONE ENCOUNTER
The patient a low CV risk for a low risk surgery/procedure. Hold the blood thinner for 2-3 days and restart when able.      JOEL

## 2023-02-02 ENCOUNTER — PREP FOR PROCEDURE (OUTPATIENT)
Dept: ADMINISTRATIVE | Age: 83
End: 2023-02-02

## 2023-02-10 NOTE — PRE-PROCEDURE INSTRUCTIONS
Patient verified name, , and procedure. Type: 1a; abbreviated assessment per anesthesia guidelines    Labs per anesthesia: none. Instructed pt that they will be notified the day before their procedure by the GI Lab for time of arrival if their procedure is [facility] and Pre-op for [place] cases. Arrival times should be called by 5 pm. If no phone is received the patient should contact their respective hospital. The GI lab telephone number is [phone] and ES Pre-op is [phone]. Follow diet and prep instructions per office including NPO status. If patient has NOT received instructions from office patient is advised to call surgeon office, verbalizes understanding. Bath or shower the night before and the am of surgery with non-moisturizing soap. No lotions, oils, powders, cologne on skin. No make up, eye make up or jewelry. Wear loose fitting comfortable, clean clothing. Must have adult present in building the entire time . Medications for the day of procedure Isosorbide, Sertraline, , Pantoprazole. , patient to hold Chlorthalidone, Losartan per anesthesia guidelines. Patient has cardiac clearance to hold Eliquis 2-3 days prior to procedure. Patient stated she would hold Eliquis 2 days prior to procedure. The following discharge instructions reviewed with patient: medication given during procedure may cause drowsiness for several hours, therefore, do not drive or operate machinery for remainder of the day. You may not drink alcohol on the day of your procedure, please resume regular diet and activity unless otherwise directed. You may experience abdominal distention for several hours that is relieved by the passage of gas. Contact your physician if you have any of the following: fever or chills, severe abdominal pain or excessive amount of bleeding or a large amount when having a bowel movement.  Occasional specks of blood with bowel movement would not be unusual.

## 2023-02-15 RX ORDER — SODIUM CHLORIDE 0.9 % (FLUSH) 0.9 %
5-40 SYRINGE (ML) INJECTION EVERY 12 HOURS SCHEDULED
Status: CANCELLED | OUTPATIENT
Start: 2023-02-15

## 2023-02-15 RX ORDER — SODIUM CHLORIDE 9 MG/ML
INJECTION, SOLUTION INTRAVENOUS PRN
Status: CANCELLED | OUTPATIENT
Start: 2023-02-15

## 2023-02-15 RX ORDER — SODIUM CHLORIDE 0.9 % (FLUSH) 0.9 %
5-40 SYRINGE (ML) INJECTION PRN
Status: CANCELLED | OUTPATIENT
Start: 2023-02-15

## 2023-02-21 ENCOUNTER — PATIENT MESSAGE (OUTPATIENT)
Dept: CARDIOLOGY CLINIC | Age: 83
End: 2023-02-21

## 2023-02-21 RX ORDER — LOSARTAN POTASSIUM 50 MG/1
50 TABLET ORAL DAILY
Qty: 90 TABLET | Refills: 3 | Status: SHIPPED | OUTPATIENT
Start: 2023-02-21

## 2023-02-21 RX ORDER — METOPROLOL SUCCINATE 50 MG/1
50 TABLET, EXTENDED RELEASE ORAL DAILY
Qty: 90 TABLET | Refills: 3 | Status: SHIPPED | OUTPATIENT
Start: 2023-02-21

## 2023-02-21 NOTE — TELEPHONE ENCOUNTER
From: Enrike [employer]man Solder  To: Dr. Saralyn Dance: 2023 1:32 PM EST  Subject: Prescription refill    Dr. Demarcus Jaime. My prescriptions for LOSARTAN 50 mgm , one daily and my  prescription for METPROLOL ER SUCCINATE 50 mgm tabs ran out on  . [address] on [facility] has not filled  them because they said your office has never called back a renewal.  Please take care of this for me as soon as possible. I am truly frustrated. Olivia FINNEGAN 1940 tel.  [phone]

## 2023-02-23 ENCOUNTER — ANESTHESIA EVENT (OUTPATIENT)
Dept: ENDOSCOPY | Age: 83
End: 2023-02-23
Payer: MEDICARE

## 2023-02-23 ENCOUNTER — ANESTHESIA (OUTPATIENT)
Dept: ENDOSCOPY | Age: 83
End: 2023-02-23
Payer: MEDICARE

## 2023-02-23 ENCOUNTER — HOSPITAL ENCOUNTER (OUTPATIENT)
Age: 83
Setting detail: OUTPATIENT SURGERY
Discharge: HOME | End: 2023-02-23
Attending: INTERNAL MEDICINE | Admitting: INTERNAL MEDICINE
Payer: MEDICARE

## 2023-02-23 VITALS
WEIGHT: 163 LBS | SYSTOLIC BLOOD PRESSURE: 122 MMHG | DIASTOLIC BLOOD PRESSURE: 62 MMHG | HEIGHT: 67 IN | OXYGEN SATURATION: 100 % | TEMPERATURE: 97.7 F | RESPIRATION RATE: 18 BRPM | HEART RATE: 71 BPM | BODY MASS INDEX: 25.58 KG/M2

## 2023-02-23 PROCEDURE — 3700000000 HC ANESTHESIA ATTENDED CARE: Performed by: INTERNAL MEDICINE

## 2023-02-23 PROCEDURE — 3609010300 HC COLONOSCOPY W/BIOPSY SINGLE/MULTIPLE: Performed by: INTERNAL MEDICINE

## 2023-02-23 PROCEDURE — 3609017700 HC EGD DILATION GASTRIC/DUODENAL STRICTURE: Performed by: INTERNAL MEDICINE

## 2023-02-23 PROCEDURE — 7100000010 HC PHASE II RECOVERY - FIRST 15 MIN: Performed by: INTERNAL MEDICINE

## 2023-02-23 PROCEDURE — 2720000010 HC SURG SUPPLY STERILE: Performed by: INTERNAL MEDICINE

## 2023-02-23 PROCEDURE — 2709999900 HC NON-CHARGEABLE SUPPLY: Performed by: INTERNAL MEDICINE

## 2023-02-23 PROCEDURE — 2580000003 HC RX 258: Performed by: REGISTERED NURSE

## 2023-02-23 PROCEDURE — 7100000011 HC PHASE II RECOVERY - ADDTL 15 MIN: Performed by: INTERNAL MEDICINE

## 2023-02-23 PROCEDURE — 6360000002 HC RX W HCPCS: Performed by: REGISTERED NURSE

## 2023-02-23 PROCEDURE — 88305 TISSUE EXAM BY PATHOLOGIST: CPT

## 2023-02-23 PROCEDURE — 3700000001 HC ADD 15 MINUTES (ANESTHESIA): Performed by: INTERNAL MEDICINE

## 2023-02-23 PROCEDURE — 2500000003 HC RX 250 WO HCPCS: Performed by: REGISTERED NURSE

## 2023-02-23 PROCEDURE — 2580000003 HC RX 258: Performed by: ANESTHESIOLOGY

## 2023-02-23 RX ORDER — SODIUM CHLORIDE 0.9 % (FLUSH) 0.9 %
5-40 SYRINGE (ML) INJECTION EVERY 12 HOURS SCHEDULED
Status: DISCONTINUED | OUTPATIENT
Start: 2023-02-23 | End: 2023-02-23 | Stop reason: HOSPADM

## 2023-02-23 RX ORDER — SODIUM CHLORIDE 9 MG/ML
INJECTION, SOLUTION INTRAVENOUS PRN
Status: DISCONTINUED | OUTPATIENT
Start: 2023-02-23 | End: 2023-02-23 | Stop reason: HOSPADM

## 2023-02-23 RX ORDER — LIDOCAINE HYDROCHLORIDE 10 MG/ML
1 INJECTION, SOLUTION INFILTRATION; PERINEURAL
Status: DISCONTINUED | OUTPATIENT
Start: 2023-02-23 | End: 2023-02-23 | Stop reason: HOSPADM

## 2023-02-23 RX ORDER — SODIUM CHLORIDE, SODIUM LACTATE, POTASSIUM CHLORIDE, CALCIUM CHLORIDE 600; 310; 30; 20 MG/100ML; MG/100ML; MG/100ML; MG/100ML
INJECTION, SOLUTION INTRAVENOUS CONTINUOUS
Status: DISCONTINUED | OUTPATIENT
Start: 2023-02-23 | End: 2023-02-23 | Stop reason: HOSPADM

## 2023-02-23 RX ORDER — SODIUM CHLORIDE 0.9 % (FLUSH) 0.9 %
5-40 SYRINGE (ML) INJECTION PRN
Status: DISCONTINUED | OUTPATIENT
Start: 2023-02-23 | End: 2023-02-23 | Stop reason: HOSPADM

## 2023-02-23 RX ORDER — LIDOCAINE HYDROCHLORIDE 20 MG/ML
INJECTION, SOLUTION EPIDURAL; INFILTRATION; INTRACAUDAL; PERINEURAL PRN
Status: DISCONTINUED | OUTPATIENT
Start: 2023-02-23 | End: 2023-02-23 | Stop reason: SDUPTHER

## 2023-02-23 RX ORDER — PROPOFOL 10 MG/ML
INJECTION, EMULSION INTRAVENOUS CONTINUOUS PRN
Status: DISCONTINUED | OUTPATIENT
Start: 2023-02-23 | End: 2023-02-23 | Stop reason: SDUPTHER

## 2023-02-23 RX ORDER — EPHEDRINE SULFATE/0.9% NACL/PF 50 MG/5 ML
SYRINGE (ML) INTRAVENOUS PRN
Status: DISCONTINUED | OUTPATIENT
Start: 2023-02-23 | End: 2023-02-23 | Stop reason: SDUPTHER

## 2023-02-23 RX ORDER — PROPOFOL 10 MG/ML
INJECTION, EMULSION INTRAVENOUS PRN
Status: DISCONTINUED | OUTPATIENT
Start: 2023-02-23 | End: 2023-02-23 | Stop reason: SDUPTHER

## 2023-02-23 RX ADMIN — SODIUM CHLORIDE, POTASSIUM CHLORIDE, SODIUM LACTATE AND CALCIUM CHLORIDE: 600; 310; 30; 20 INJECTION, SOLUTION INTRAVENOUS at 10:32

## 2023-02-23 RX ADMIN — Medication 10 MG: at 11:13

## 2023-02-23 RX ADMIN — PROPOFOL 160 MCG/KG/MIN: 10 INJECTION, EMULSION INTRAVENOUS at 10:50

## 2023-02-23 RX ADMIN — PHENYLEPHRINE HYDROCHLORIDE 100 MCG: 10 INJECTION INTRAVENOUS at 11:07

## 2023-02-23 RX ADMIN — PHENYLEPHRINE HYDROCHLORIDE 50 MCG: 10 INJECTION INTRAVENOUS at 11:13

## 2023-02-23 RX ADMIN — PROPOFOL 50 MG: 10 INJECTION, EMULSION INTRAVENOUS at 10:50

## 2023-02-23 RX ADMIN — PHENYLEPHRINE HYDROCHLORIDE 100 MCG: 10 INJECTION INTRAVENOUS at 11:02

## 2023-02-23 RX ADMIN — LIDOCAINE HYDROCHLORIDE 100 MG: 20 INJECTION, SOLUTION EPIDURAL; INFILTRATION; INTRACAUDAL; PERINEURAL at 10:50

## 2023-02-23 ASSESSMENT — ENCOUNTER SYMPTOMS: SHORTNESS OF BREATH: 1

## 2023-02-23 ASSESSMENT — PAIN - FUNCTIONAL ASSESSMENT: PAIN_FUNCTIONAL_ASSESSMENT: NONE - DENIES PAIN

## 2023-02-23 ASSESSMENT — PAIN SCALES - GENERAL
PAINLEVEL_OUTOF10: 0

## 2023-02-23 NOTE — PROGRESS NOTES
1156-Discharge instructions were reviewed with patient and pts spouse, Veronica Fox. An opportunity was given for questions. Patient verbalized understanding, and has no questions at this time. 1205-To lobby via wheelchair accompanied by staff. Discharged to home in good condition via private vehicle.

## 2023-02-23 NOTE — OP NOTE
ESOPHAGOGASTRODUODENOSCOPY        DATE of PROCEDURE: 2/23/2023    PT NAME: Timoteo Brown  [id number]    PHYSICIAN:  Neo Lee MD    MEDICATION:  MAC    INSTRUMENT: GIFQ    PROCEDURE:  EGD with dilation of duodenal stricture with TTS balloon up to 15 mm, EGD diagnostic    INDICATIONS: Hx of Duodenal Stricture, RUQ pain     FINDINGS:  OROPHARYNX: Cords and pyriform recesses normal.  ESOPHAGUS: The esophagus is normal. The proximal, mid and distal portions are normal. The Z-Line is intact. GEJ is located at about 36 cm from the incisors. STOMACH: The fundus on antegrade and retroflex views is normal. The body, antrum and pylorus is normal.  DUODENUM: The bulb is normal.  There is a benign stricture in the C loop. I was able to traverse my scope with resistance into the 2nd portion. A periampullary diverticulum was noted. The stricture was dilated using a TTS balloon from 12 to 13.5 and finally 15 mm where it was held for about 1 minute. After the dilation, small heme was noted but no rents. ASSESSMENT:  1. Duodenal Stricture dilated using TTS balloon from 12 to 13.5 to 15 mm  2. Periampullary Diverticulum    PLAN:  1.  Proceed with colonoscopy    Neo Lee MD  [facility]

## 2023-02-23 NOTE — DISCHARGE INSTRUCTIONS
Gastrointestinal Esophagogastroduodenoscopy (EGD) - Upper Exam Discharge Instructions    1. Call Dr. Mag Mo at [phone] for any problems or questions. 2. Contact the doctor's office for follow up appointment as directed. 3. Medication may cause drowsiness for several hours, therefore:  Do not drive or operate machinery for remainder of the day. No alcohol today. Do not make any important or legal decisions for 24 hours. Do not sign any legal documents for 24 hours. 5. Ordinarily, you may resume regular diet and activity after exam unless otherwise specified by your physician. 6. For mild soreness in your throat you may use Cepacol throat lozenges or warm salt-water gargles as needed. Gastrointestinal Colonoscopy/Flexible Sigmoidoscopy - Lower Exam Discharge Instructions    Medication may cause drowsiness for several hours, therefore, do not drive or operate machinery for remainder of the day. No alcohol today. Ordinarily, you may resume regular diet and activity after exam unless otherwise specified by your physician. Because of air put into your colon during exam, you may experience some abdominal distension, relieved by the passage of gas, for several hours. Contact your physician if you have any of the following:  Excessive amount of bleeding - large amount when having a bowel movement.   Occasional specks of blood with bowel movement would not be unusual.  Severe abdominal pain  Fever or Chills

## 2023-02-23 NOTE — OP NOTE
COLONOSCOPY      DATE of PROCEDURE: 2/23/2023    PT NAME: Tray Dorsey Solder  [id number]    PHYSICIAN:  Celine Jackson MD    MEDICATION: MAC  INSTRUMENT:CFHQ 190 L  SPECIAL PROCEDURE: Colonoscopy with polypectomy with cold forceps, Colonoscopy with biopsies, Colonoscopy diagnostic  INDICATIONS: Chronic Diarrhea  BLOOD LOSS: None  SPECIMENS: Ascending colon polyps, Random colon biopsies    PROCEDURE: After obtaining informed consent, the patient was placed in the left lateral position and sedated. A digital rectal exam was performed. The colonoscope was inserted into the rectum and passed under direct vision to the cecum where the ileocecal valve and appendiceal orifice were identified. The colonoscope was withdrawn with careful evaluation of the mucosa. Retroflexion was performed in the rectum. The prep was good. The patient was taken to the recovery area in stable condition. FINDINGS:  ANUS:   Anal exam reveals no masses or hemorrhoids, sphincter tone is normal.  RECTUM: Rectal exam including retroflexion of the rectum reveals no masses. Small internal hemorrhoids noted on retroflexion. SIGMOID COLON: The mucosa is normal with good vascular pattern and without ulcers or polyps. Severe diverticulosis. DESCENDING COLON: The mucosa is normal with good vascular pattern and without ulcers. Mild diverticulosis. SPLENIC FLEXURE: The splenic flexure is normal.  TRANSVERSE COLON: The mucosa is normal with good vascular pattern and without ulcers. Mild diverticulosis. HEPATIC FLEXURE: The hepatic flexure is normal.  ASCENDING COLON: The mucosa is normal with good vascular pattern and without ulcers or diverticula. Three sessile polyps ranging from 2-4 mm in size were removed using cold forceps and sent for histology.   CECUM: The appendiceal orifice appears normal. The ileocecal valve appears normal.  TERMINAL ILEUM: The mucosa of the terminal ileum was normal.    Random colon biopsies were obtained throughout the colon using cold forceps to rule out microscopic colitis and sent for histology. ASSESSMENT:  1. Ascending colon polyps x 3  2. Diverticulosis  3. Small internal hemorrhoids    PLAN:  1. Increase hydration and fiber  2. Follow up pathology  3. If adenomas, will plan on repeating colonoscopy in 3 years  4.  Clinic follow up in 2-3 months with Dr. Gabriela Fox MD  [facility]

## 2023-02-23 NOTE — ANESTHESIA POSTPROCEDURE EVALUATION
Department of Anesthesiology  Postprocedure Note    Patient: Marietta Hager  MRN: [id number]  YOB: 1940  Date of evaluation: 2/23/2023      Procedure Summary     Date: 02/23/23 Room / Location: [facility] ENDO 03 / [facility] ENDOSCOPY    Anesthesia Start: 4970 Anesthesia Stop: 18    Procedures:       EGD PYLORIC DILATION BALLOON (Upper GI Region)      COLONOSCOPY WITH BIOPSY Diagnosis:       Obstructed, duodenum      Family history of malignant neoplasm of digestive organ      (Obstructed, duodenum [K31.5])      (Family history of malignant neoplasm of digestive organ [Z80.0])    Surgeons: Jake Hughes MD Responsible Provider: Neil Godoy MD    Anesthesia Type: TIVA ASA Status: 3          Anesthesia Type: TIVA    Pillo Phase I: Pillo Score: 10    Pillo Phase II:        Anesthesia Post Evaluation    Patient location during evaluation: PACU  Patient participation: complete - patient participated  Level of consciousness: awake and alert  Airway patency: patent  Nausea & Vomiting: no nausea and no vomiting  Complications: no  Cardiovascular status: hemodynamically stable  Respiratory status: acceptable, nonlabored ventilation and spontaneous ventilation  Hydration status: euvolemic  Comments: BP (!) 102/58   Pulse 68   Temp 97.7 °F (36.5 °C) (Skin)   Resp (!) 9   Ht 5' 7\" (1.702 m)   Wt 163 lb (73.9 kg)   SpO2 100%   BMI 25.53 kg/m²     Multimodal analgesia pain management approach

## 2023-02-23 NOTE — ANESTHESIA PRE PROCEDURE
Department of Anesthesiology  Preprocedure Note       Name:  Enio Morales   Age:  79 y.o.  :  1940                                          MRN:  [id number]         Date:  2023      Surgeon: Brent Lundy):  Blu Maria MD    Procedure: Procedure(s):  EGD ESOPHAGOGASTRODUODENOSCOPY  COLORECTAL CANCER SCREENING, NOT HIGH RISK/27    Medications prior to admission:   Prior to Admission medications    Medication Sig Start Date End Date Taking? Authorizing Provider   metoprolol succinate (TOPROL XL) 50 MG extended release tablet Take 1 tablet by mouth daily 23   Xiao Bagley MD   losartan (COZAAR) 50 MG tablet Take 1 tablet by mouth daily 23   Xiao Bagley MD   isosorbide mononitrate (IMDUR) 30 MG extended release tablet TAKE 1 TABLET BY MOUTH EVERY MORNING 22   Xiao Bagley MD   dicyclomine (BENTYL) 10 MG capsule 1 cap po Q8H PRN Diarrhea and cramps. Max daily amount 30 mg 22   Garcia Shelton MD   chlorthalidone (HYGROTON) 25 MG tablet Take 1 tablet by mouth in the morning.  8/10/22   Xiao Bagley MD   apixaban (ELIQUIS) 5 MG TABS tablet Take 1 tablet by mouth 2 times daily 22   Xiao Bagley MD   acetaminophen (TYLENOL) 650 MG extended release tablet Take 650 mg by mouth every 6 hours as needed    Ar Automatic Reconciliation   bimatoprost (LUMIGAN) 0.01 % SOLN ophthalmic drops Apply 1 drop to eye every evening    Ar Automatic Reconciliation   meloxicam (MOBIC) 7.5 MG tablet Take 7.5-15 mg by mouth daily as needed 18   Ar Automatic Reconciliation   nitroGLYCERIN (NITROSTAT) 0.4 MG SL tablet DISSOLVE 1 TABLET UNDER TONGUE EVERY 5 MINUTES AS NEEDED FOR CHEST PAIN 18   Ar Automatic Reconciliation   pantoprazole (PROTONIX) 40 MG tablet daily 3/14/21   Ar Automatic Reconciliation   sertraline (ZOLOFT) 50 MG tablet Take 50 mg by mouth daily 2/10/22   Ar Automatic Reconciliation   sucralfate (CARAFATE) 1 GM tablet 1 po tid (before meals) 2/10/22   Ar Automatic Reconciliation       Current medications:    No current facility-administered medications for this visit. No current outpatient medications on file. Facility-Administered Medications Ordered in Other Visits   Medication Dose Route Frequency Provider Last Rate Last Admin    lidocaine 1 % injection 1 mL  1 mL IntraDERmal Once PRN Belle Pham MD        lactated ringers IV soln infusion   IntraVENous Continuous Belle Pham  mL/hr at 02/23/23 1032 New Bag at 02/23/23 1032    sodium chloride flush 0.9 % injection 5-40 mL  5-40 mL IntraVENous 2 times per day Belle Pham MD        sodium chloride flush 0.9 % injection 5-40 mL  5-40 mL IntraVENous PRN Belle Pham MD        0.9 % sodium chloride infusion   IntraVENous PRN Belle Pham MD        sodium chloride flush 0.9 % injection 5-40 mL  5-40 mL IntraVENous 2 times per day David Esteves MD        sodium chloride flush 0.9 % injection 5-40 mL  5-40 mL IntraVENous PRN David Esteves MD        0.9 % sodium chloride infusion   IntraVENous PRN David Esteves MD           Allergies: Allergies   Allergen Reactions    Albuterol Shortness Of Breath     \"coughing spasms and difficulty breathing\"    Amoxicillin Anaphylaxis and Swelling     Other reaction(s): Lips/Mouth swelling-A    Guaifenesin Swelling     Lips swelling    Iodine Rash     Other reaction(s): Hives/Swelling-A    [facility] Flavor      Ulcerated tongue.     Ciprofloxacin Other (See Comments) and Nausea Only     Other reaction(s): Joint soreness-I    Dronedarone Angioedema, Diarrhea and Itching     Severe diarrhea, lips felt scorched & gum swelling & generalized itching    Macadamia Nut Oil Hives     Patient states she is not allergic    Omega-3 Fatty Acids Hives    Sulfa Antibiotics Other (See Comments)     Leg cramps    Sulfamethoxazole-Trimethoprim Other (See Comments)       Problem List:    Patient Active Problem List   Diagnosis Code    Coronary atherosclerosis of native coronary vessel I25.10    Coronary artery disease of native artery of native heart with stable angina pectoris ([facility]) I25.118    Symptomatic bradycardia R00.1    Hypokalemia E87.6    Right middle lobe pneumonia J18.9    Sepsis (HCC) A41.9    PUD (peptic ulcer disease) K27.9    Chest pain R07.9    Mild depression F32. 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  Chronic pain     arthritis in knees    Coronary artery disease of native artery of native heart with stable angina pectoris ([facility] [address] 07/25/2013    prior thrombotic occlusion of the LAD. No obstructive disease at cath  2002 Aug 2013: Cath no obstructive CAD Apr 2016 - EF 60-65%.  pseudonormal diastolic function, RVSP 43 Jun 2017 - Lexiscan MPI - normal EKG, perfusion and LVEF Oct 2021- Echo - EF 50-55%, mild to mod MR, bi atrial enlargement, mild  RVE, RVSP 44 Aug 2022 - normal perfusion, EF 66%     Dyslipidemia     GERD (gastroesophageal reflux disease)     controlled with med    Glaucoma     History of basal cell cancer     multiple    History of peptic ulcer 2002 and 2014    HTN (hypertension)     controlled with med    Hx of colonic polyps          Macular edema     Followed by [facility]    PUD (peptic ulcer disease)     Sick sinus syndrome ([facility] [address] 06/26/2017 Jun 2017 - 24 hour  Monitor - NSR, occasional PAC, 2-3 episodes brief atrial tachycardia, longest 14 beats, asymptomatic.  2 episodes AIVR during sleep, rate 64, longest 4 beats. No diary entries Nov 2021- dual chamber Medtronic pacemaker    Staph infection 2018    s/p left great toenail removal       Past Surgical History:        Procedure Laterality Date    BLADDER SUSPENSION  2006   [facility] CARDIAC CATHETERIZATION  2001, 2002--2013    no intervention-normal    CATARACT REMOVAL Left 2012    with IOL    CATARACT REMOVAL Right 2007    with IOL    CHOLECYSTECTOMY, LAPAROSCOPIC  1999    and ERCP    COLONOSCOPY  09/23/2014    COLONOSCOPY      multiple    EP DEVICE PROCEDURE N/A 10/11/2022    Insert PPM biv multi performed by Fili Billings MD at [facility] CATH LAB    EP DEVICE PROCEDURE N/A 10/11/2022    ABLATION AV NODE performed by Fili Billings MD at [facility] CATH LAB    HYSTERECTOMY (CERVIX STATUS UNKNOWN)  [address] ARTHROSCOPY Left 1995    knee scope    KNEE ARTHROSCOPY Right 1996    knee scope    KNEE ARTHROSCOPY Right 2007    knee scope    LUMBAR LAMINECTOMY  1978 and 1979    back surg--x 2      PACEMAKER PLACEMENT  12/03/2021    TN UNLISTED PROCEDURE CARDIAC SURGERY  2021    cardioversion x2    TUBAL LIGATION  1997    UPPER GASTROINTESTINAL ENDOSCOPY      multiple       Social History:    Social History     Tobacco Use    Smoking status: Never    Smokeless tobacco: Never   Substance Use Topics    Alcohol use: Yes     Alcohol/week: 7.0 standard drinks     Types: 7 Glasses of wine per week                                Counseling given: Not Answered      Vital Signs (Current): There were no vitals filed for this visit.                                            BP Readings from Last 3 Encounters:   02/23/23 124/62   10/21/22 112/72   10/12/22 115/70       NPO Status: BMI:   Wt Readings from Last 3 Encounters:   02/23/23 163 lb (73.9 kg)   10/21/22 169 lb (76.7 kg)   10/12/22 170 lb 4.8 oz (77.2 kg)     There is no height or weight on file to calculate BMI.    CBC:   Lab Results   Component Value Date/Time    WBC 8.0 10/07/2022 10:25 AM    RBC 4.37 10/07/2022 10:25 AM    HGB 13.0 10/07/2022 10:25 AM    HCT 40.9 10/07/2022 10:25 AM    MCV 93.6 10/07/2022 10:25 AM    RDW 13.9 10/07/2022 10:25 AM     10/07/2022 10:25 AM       CMP:   Lab Results   Component Value Date/Time     10/07/2022 10:25 AM    K 4.0 10/07/2022 10:25 AM     10/07/2022 10:25 AM    CO2 29 10/07/2022 10:25 AM    BUN 20 10/07/2022 10:25 AM    CREATININE 0.60 10/07/2022 10:25 AM    GFRAA >60 08/10/2022 11:16 AM    AGRATIO 1.4 02/10/2022 10:13 AM    LABGLOM >60 10/07/2022 10:25 AM    GLUCOSE 104 10/07/2022 10:25 AM    PROT 7.4 08/10/2022 11:16 AM    CALCIUM 9.4 10/07/2022 10:25 AM    BILITOT 0.8 08/10/2022 11:16 AM    ALKPHOS 120 08/10/2022 11:16 AM    ALKPHOS 110 02/10/2022 10:13 AM    AST 18 08/10/2022 11:16 AM    ALT 26 08/10/2022 11:16 AM       POC Tests: No results for input(s): POCGLU, POCNA, POCK, POCCL, POCBUN, POCHEMO, POCHCT in the last 72 hours.     Coags:   Lab Results   Component Value Date/Time    PROTIME 15.8 10/07/2022 10:25 AM    INR 1.2 10/07/2022 10:25 AM       HCG (If Applicable): No results found for: PREGTESTUR, PREGSERUM, HCG, HCGQUANT     ABGs: No results found for: PHART, PO2ART, MQN0LZZ, RUY2UOP, BEART, E8BBHLHZ     Type & Screen (If Applicable):  No results found for: LABABO, LABRH    Drug/Infectious Status (If Applicable):  No results found for: HIV, HEPCAB    COVID-19 Screening (If Applicable):   Lab Results   Component Value Date/Time    COVID19 Not Detected 11/30/2021 10:07 AM    COVID19 Performed 11/30/2021 10:07 AM           Anesthesia Evaluation  Patient summary reviewed and Nursing notes reviewed no history of anesthetic complications:   Airway: Mallampati: I  TM distance: >3 FB   Neck ROM: full  Mouth opening: > = 3 FB   Dental:    (+) lower dentures and upper dentures      Pulmonary: breath sounds clear to auscultation  (+) shortness of breath:                             Cardiovascular:    (+) hypertension:, pacemaker: pacemaker, past MI (oldl MI from clot that was dissolved but no stents):, dysrhythmias (H/o AFib but is now s/p AVN ablation):, MONTEZ:, hyperlipidemia        Rhythm: regular  Rate: normal                 ROS comment: Echo 8/2022- normal LV and RV fxn, no sign valvular abnormalities    Low risk nuclear stress 2022     Neuro/Psych:   (+) depression/anxiety             GI/Hepatic/Renal:   (+) GERD: well controlled, PUD,           Endo/Other:    (+) : arthritis:., .                 Abdominal:             Vascular: negative vascular ROS. Other Findings:             Anesthesia Plan      TIVA     ASA 3       Induction: intravenous. Anesthetic plan and risks discussed with patient and spouse.                         Jennelle Krabbe, MD   2/23/2023

## 2023-02-23 NOTE — H&P
Preoperative H&P    Patient is followed by Dr. Barbara Aguiar. See his clinic note below for full details. She is here today for an EGD (for evaluation of RUQ pain and known duodenal stricture and to consider a possible dilation) and a diagnostic Colonoscopy (for evaluation of chronic diarrhea). She is holding Eliquis for the procedures. /62   Pulse 73   Temp 98.1 °F (36.7 °C) (Oral)   Resp 16   Ht 5' 7\" (1.702 m)   Wt 163 lb (73.9 kg)   SpO2 95%   BMI 25.53 kg/m²   GEN: Lying in bed in NAD, Elderly  RESP: comfortable on room air  CV: RRR, pacemaker  ABD: soft, NT, ND  NEURO: awake and interactive  PSYCH: normal mood    Endoscopy and risks explained to the patient. Risks including reaction to sedation, cardiopulmonary events, infection, bleeding, perforation, requirement for surgery if complications should occur, death were explained to patient who expressed understanding and agreed to proceed with endoscopy. Neo Lee MD   [facility]                        Patient:   Canda Simmonds  YOB: 1940   Date:                        01/16/2023 10:45 AM   Visit Type:                  Office Visit  Provider:   Mariya Nolasco MD  Referring Provider:  Diana Menard MD [facility]  Primary Care Provider: Diana Menard MD [address] [place] [address]    This 79year old  patient was referred by Diana Menard MD.  This 79year old female presents for Diarrhea. History of Present Illness:  1. Diarrhea   Mrs. Wilfrido Haynes is an 79year old female who presents for follow up of diarrhea. She was last seen on 6/14/22 where she was recommended to schedule an EGD and ordered labs and stool studies. EGD 7/21/22: Acute superficial gastritis, duodenal stenosis, duodenal diverticulum. Duodenal bxs unremarkable. Gastric bxs having changes of repair. Duodenal stricture bxs unremarkable.      Labs 7/21/22: CloTEST negative     Labs 6/20/22:stool wbc negative, no salmonella, no e. coli, pancreatic elastase normal, no O&P, fecal occult blood negative, h. pylori negative, giardia negative, fats normal    Labs 6/15/22: lipase and amylase normal, CBC normal, CMP normal except Creatinine 0.51 and Glucose 127    Patient complains of diarrhea and episodes of fecal urgency/incontinence. She reports that she takes dicyclomine BID which controls her abdominal cramping. Patient reports after her most recent EGD her abdominal discomfort had improved. She cannot identify any culprits of her diarrhea. She has tried dietary modification with no improvement of her bowel habits. Notably, her weight is down 7 lbs since her last visit. She reports she had a cardiac ablation done in October. Past Medical/Surgical History:   (Detailed)  Disease/disorder Onset Date Management Date Comments   Coronary artery disease       Hypertension  drug therapy     Irritable bowel disease       Pancreatitis       Peptic ulcer disease         bladder suspension 2006     Duodenal stricture    SHEREEN 08/07/2015 -   IPMN of pancreas    SHEREEN 08/07/2015 -     heart cath 2001.2002. 2013       Hysterectomy 1990       Knee Arthroscopy-Right 1996, 2007     EGD Oct 2013 with Dr. Niles Essex with gastritis, duodenal stricture, diverticulum in 2nd portion of duodenum  Gastric bx with chronic gastritis/OLIVIA negative, duodenal bx with foveolar metaplasia, Brunner's gland hyperplasia. Obtain CT scan abd/pelvis, UGI/SBFT     EGD July 2013 with Dr. Niles Essex with antral ulcers x 2 with associated antral gastritis, nodular bulub with post bulbar stenosis, possible Flynn's, C-loop not entered due to nodular bulb and bulbar stenosis  Esophageal bxs with no Flynn's, gastric bx with repair, negative for H pylori, duodenal bx with Brunner's gland. Surv in 8 weeks.      Colonoscopy Oct 2009 with Dr. Mikhail Rees with diverticulosis-severe  Surv in 5 yrs     EGD Oct 2009 with Dr. Mikhail Rees with gastritis         Rt cataract removal 2007 Left cataract surgery 2012       Back surgery x 2 Lumbar laminectomy [id number]       LAP Cholecystectomy ERCP        Tubal ligation        Knee Arthroscopy-Left 1995     Colonoscopy 2005 with Dr. Rohan Finch with diverticulosis, hemorrhoids  Surv in 3 yrs     EGD 2005 with Dr. Rohan Finch with gastric ulcer, gastritis       Atrial fibrillation  Eliquis       Cardiac pacemaker-Medtronic 2021       Procedure History  Test Date Results Interp   EGD 2022 Acute superficial gastritis without hemorrhage, Duodenal stenosis, Duodenal diverticulum    EGD 2014 Gastric ulcer, Duodenal stenosis    EGD 2014 Gastric ulcer, Duodenal stenosis    COLONOSCOPY 2014 Colon polyp, Diverticulosis, Internal hemorrhoids, External hemorrhoids, Diarrhea      Colonoscopy  Diverticulosis, HP polyp, hemorrhoids, random biopsies benign  Multiple prior EGDs with balloon dilation of a duodenal stricture. Wire guided balloon under fluoroscopy because of a duodenal diverticulum. Family History:  (Detailed)  Relationship Family Member Name  Age at Death Condition Onset Age Cause of Death       Family history of Irritable bowel disease  N       Family history of Crohn's disease  N   Father    Cancer, colon  N     Family History Comments  Relationship Family Member Name Condition Comments     Family history of Irritable bowel disease Sister and other brother     Family history of Crohn's disease Mother and brother     Social History:  (Detailed)  Tobacco use reviewed. Preferred language is [place]. MARITAL STATUS/FAMILY/SOCIAL SUPPORT  Currently . CHILDREN  Has children:  Tobacco use status: Never smoked tobacco.  Smoking status: Never smoker. SMOKING STATUS  Type Smoking Status Usage Per Day Years Used Total Pack Years    Never smoker        ALCOHOL  There is a history of alcohol use. Type: Wine. consumed weekly. CAFFEINE  The patient uses caffeine: coffee - 2 cups a day.   COMMENTS No history of drug use, blood transfusions, tattoos, or piercing. Current Medications:  Medication Generic Name Directions   ACETAMINOPHEN EXTRA STRENGTH ACETAMINOPHEN PRN   chlorthalidone 25 mg tablet chlorthalidone take 1 tablet by oral route  every day   dicyclomine 10 mg capsule dicyclomine HCl TAKE ONE CAPSULE BY MOUTH THREE TIMES DAILY BEFORE MEALS AS NEEDED FOR URGENCY/ABDOMINAL CRAMPING   Eliquis 5 mg tablet apixaban take 1 tablet by oral route 2 times every day   isosorbide mononitrate ER 30 mg tablet,extended release 24 hr isosorbide mononitrate take 1 tablet by oral route  every day in the morning   losartan 100 mg tablet losartan potassium take 1 tablet by oral route  every day   Lumigan 0.01 % eye drops bimatoprost    methenamine hippurate 1 gram tablet methenamine hippurate take 1 tablet by oral route 2 times every day   metoprolol succinate ER 25 mg tablet,extended release 24 hr metoprolol succinate take 1 tablet by oral route  every day   pantoprazole 40 mg tablet,delayed release pantoprazole sodium TAKE 1 TABLET BY ORAL ROUTE EVERY DAY 30min-1hour before breakfast   sucralfate 1 gram tablet sucralfate take 1 tablet by oral route 4 times every day on an empty stomach 1 hour before meals and at bedtime as needed   Zoloft 50 mg tablet sertraline HCl take 1 tablet by oral route  every day     Allergies:  Ingredient Reaction (Severity) Medication Name Comment   ADHESIVE TAPE Hives  ADHESIVE TAPE. AMOXICILLIN Lip swelling; Anaphylaxis  AMOXICILLIN. CIPROFLOXACIN Myalgias; joint soreness Cipro    CIPROFLOXACIN HCL Myalgias; joint soreness Cipro    FISH OIL Rash     IODINE Hives; swelling  IODINE.   NSAIDS (NON-STEROIDAL ANTI-INFLAMMATORY DRUG) bowel obstruction     SHELLFISH DERIVED Anaphylaxis; Hives     SULFAMETHOXAZOLE Leg cramp Bactrim    TREE NUT Hives     TRIMETHOPRIM Leg cramp Bactrim    VITAMIN E (D-ALPHA TOCOPHEROL) Hives     Reviewed, no changes.     Review of Systems:  System Neg/Pos Details   Constitutional Positive Weight loss (Amount: 7.00 lbs. ( kgs.)). Constitutional Negative Fatigue and Fever. ENMT Positive Hoarseness. ENMT Negative Hearing deficit and Sleep apnea. Eyes Negative Vision changes. Respiratory Negative Cough and Dyspnea. Cardio Negative Chest pain and Irregular heartbeat/palpitations. GI Positive Abdominal pain, Diarrhea. GI Negative Constipation, Dysphagia, Heartburn, Jaundice, Nausea, Rectal bleeding, Reflux and Vomiting.  Negative Dysuria, Urinary difficulty and Urinary frequency. Endocrine Negative Cold intolerance, Heat intolerance and Weight gain. Neuro Negative Confusion/disorientation, Dizziness, Headache and Seizures. Psych Negative Anxiety, Depression and Panic attacks. Integumentary Negative Itching skin and Rash. MS Negative Joint pain and Myalgia. Hema/Lymph Negative Anemia and Easy bleeding. Reproductive Negative Breast lumps. All other review of systems are negative. Vital Signs:  BP mm/Hg Pulse Resp Pulse Ox Temp F Ht (Total in.) Weight (lbs.) Weight (oz.) BMI   136/88 98 15 98  67.00 171.60  26.88     Physical Exam:  Exam Findings Details   Eyes Normal Sclera - Right: Normal, Left: Normal.   Nasopharynx Normal Lips/teeth/gums - Normal.   Neck Exam Normal Inspection - Normal.   Respiratory Normal Inspection - Normal.   Cardiovascular Normal Regular rate and rhythm. No murmurs, gallops, or rubs. Abdomen Normal Inspection - Normal. Auscultation - Normal. Percussion - Normal. No abdominal tenderness. No hepatic enlargement. No spleen enlargement. No Ascites. Skin Normal Inspection - Normal.   Musculoskeletal Normal Hands/Wrist - Right: Normal, Left: Normal.   Extremity Normal No edema. Neurological Normal Fine motor skills - Normal.   Psychiatric Normal Orientation - Oriented to time, place, person & situation. Appropriate mood and affect. Assessment/Plan:  # Detail Type Description    1.  Assessment Diarrhea in adult patient (R19.7). Provider Plan Schedule an EGD/colonoscopy at the hospital given her age and cardiac issues  Risks (bleed, perforation, infection, untoward CV or resp. event, mortality, etc.), benefits and alternatives of the procedures were discussed with the patient who agrees to proceed. Ordered stool studies         2. Assessment Duodenal stricture (K31.5). Plan Orders Further evaluations ordered today include EGD W/WO Cytology to be performed. 3. Assessment RUQ pain (R10.11). Provider Plan Associated with #1--we will do an EGD with possible balloon dilatation at the time of the colonoscopy. Risks (bleed, perforation, infection, untoward CV or resp. event, mortality, etc.), benefits and alternatives were discussed with the patient who agrees to proceed. 4. Assessment PUD (peptic ulcer disease) (K27.9). 5. Assessment Diverticulosis (K57.90). 6. Assessment Family history of colon cancer (Z80.0). 7. Assessment Chronic atrial fibrillation (I48.20). Provider Plan Hold Eliquis after cardiac clearance         8. Assessment Primary hypertension (I10). 9. Assessment Status cardiac pacemaker (Z95.0). Counseling / Educational Factors:  Items reviewed / discussed during today's visit: prior testing / procedures were reviewed; testing was discussed in detail; old records were reviewed; indications and risks of the procedure were discussed; prescription medication usage was discussed; symptomatic care was discussed; advised to continue current treatment; patient instructed to call for results of diagnostic testing. Patient expressed understanding of instructions. Other advice: Review cardiology records; EGD and colonoscopy after stool studies. The patient was checked out at 11:46 AM by Alexey Kidd. I personally performed the services described in this documentation.   [employer]) assisted in my presence with documentation, which I have reviewed and validated. Provider:   Payton Moore 01/16/2023 11:50 AM   Document generated by: Sammie Palacios. Cristiane Shannon MD 01/16/2023     Providers:  Diamante Fitzpatrick MD, MD  [facility] [place] [address] [facility] [employer]  [place],  [address] Fax     ___________________________________________________________________________________________________________________________    Electronically signed by Sammie Beatty MD on 01/16/2023 11:51 AM

## 2023-02-28 PROBLEM — I21.9: Status: ACTIVE | Noted: 2023-02-28

## 2023-02-28 NOTE — PROGRESS NOTES
[facility] CARDIOLOGY  [address] [place] [address], [address] [facility], [address] [place] [address]  PHONE: [phone]      23    NAME:  Hellen Diamond  : 1940  MRN: [id number]         SUBJECTIVE:   Hellen Diamond is a 79 y.o. female seen for a follow up visit regarding the following:     Chief Complaint   Patient presents with    Hypertension              HPI:  Follow up  Hypertension   . Follow up afib, highly symptomatic when in afib and ultimately had DCPM with reactive ATP for AF. Since her last visit with me she has undergone AVN ablation and biventricular pacemaker implantation with Dr Maik Evans. It took a few weeks but she feels the biventricular device finally kicked in and she's back to her old self. Her only complaint is chronic venous stasis with color change,  numbness, but no edema. Notes some poor balance. Is diagnosed with peripheral neuropathy           Past cardiac history:   prior thrombotic occlusion of the LAD. No obstructive disease at cath 2002   Aug 2013: Cath no obstructive CAD   2016 - EF 60-65%. pseudonormal diastolic function, RVSP 43   2017 - Lexiscan MPI - normal EKG, perfusion and LVEF   2017 - 24 hour  Monitor - NSR, occasional PAC, 2-3 episodes brief atrial tachycardia, longest 14 beats, asymptomatic.  2 episodes AIVR during sleep, rate 64, longest 4 beats. No diary entries   Oct 2021- afib   Oct 2021- Echo - EF 50-55%, mild to mod MR, bi atrial enlargement, mild RVE, RVSP 44-no significant change from previous   10/28/21 - successful DCCV but with early return to AF   Mar 2022- DCPM for symptomatic lenora with reactive ATP for AF (Dr Maik Evans)  Aug 2022        Echo - normal SF, ind DF d/t afib (E/e' 9), WEI, normal RVSP, no significant valve disease  Oct 2022       AVN ablation, biventricular pacemaker - Dr Maida Prieto CAD CHF Meds            metoprolol succinate (TOPROL XL) 50 MG extended release tablet (Taking)    Sig - Route:  Take 1 tablet by mouth daily - Oral    losartan (COZAAR) 50 MG tablet (Taking)    Sig - Route: Take 1 tablet by mouth daily - Oral    chlorthalidone (HYGROTON) 25 MG tablet (Taking)    Sig - Route: Take 1 tablet by mouth in the morning. - Oral    apixaban (ELIQUIS) 5 MG TABS tablet (Taking)    Sig - Route: Take 1 tablet by mouth 2 times daily - Oral    Class: Phone In          Key Antihyperglycemic Medications       Patient is on no antihyperglycemic meds. Past Medical History, Past Surgical History, Family history, Social History, and Medications were all reviewed with the patient today and updated as necessary. Prior to Admission medications    Medication Sig Start Date End Date Taking? Authorizing Provider   metoprolol succinate (TOPROL XL) 50 MG extended release tablet Take 1 tablet by mouth daily 2/21/23  Yes Rayann Cowden, MD   losartan (COZAAR) 50 MG tablet Take 1 tablet by mouth daily 2/21/23  Yes Rayann Cowden, MD   isosorbide mononitrate (IMDUR) 30 MG extended release tablet TAKE 1 TABLET BY MOUTH EVERY MORNING 11/27/22  Yes Rayann Cowden, MD   dicyclomine (BENTYL) 10 MG capsule 1 cap po Q8H PRN Diarrhea and cramps. Max daily amount 30 mg 8/11/22  Yes Isabel Soto MD   chlorthalidone (HYGROTON) 25 MG tablet Take 1 tablet by mouth in the morning.  8/10/22  Yes Rayann Cowden, MD   apixaban (ELIQUIS) 5 MG TABS tablet Take 1 tablet by mouth 2 times daily 7/8/22  Yes Rayann Cowden, MD   acetaminophen (TYLENOL) 650 MG extended release tablet Take 650 mg by mouth every 6 hours as needed   Yes Ar Automatic Reconciliation   bimatoprost (LUMIGAN) 0.01 % SOLN ophthalmic drops Apply 1 drop to eye every evening   Yes Ar Automatic Reconciliation   meloxicam (MOBIC) 7.5 MG tablet Take 7.5-15 mg by mouth daily as needed 12/4/18  Yes Ar Automatic Reconciliation   nitroGLYCERIN (NITROSTAT) 0.4 MG SL tablet DISSOLVE 1 TABLET UNDER TONGUE EVERY 5 MINUTES AS NEEDED FOR CHEST PAIN 6/13/18  Yes Ar Automatic Reconciliation   pantoprazole (PROTONIX) 40 MG tablet daily 3/14/21  Yes Ar Automatic Reconciliation   sertraline (ZOLOFT) 50 MG tablet Take 50 mg by mouth daily 2/10/22  Yes Ar Automatic Reconciliation     Allergies   Allergen Reactions    Albuterol Shortness Of Breath     \"coughing spasms and difficulty breathing\"    Amoxicillin Anaphylaxis and Swelling     Other reaction(s): Lips/Mouth swelling-A    Guaifenesin Swelling     Lips swelling    Iodine Rash     Other reaction(s): Hives/Swelling-A    Eating [facility] Flavor      Ulcerated tongue. Ciprofloxacin Other (See Comments) and Nausea Only     Other reaction(s): Joint soreness-I    Dronedarone Angioedema, Diarrhea and Itching     Severe diarrhea, lips felt scorched & gum swelling & generalized itching    Omega-3 Fatty Acids Hives    Sulfa Antibiotics Other (See Comments)     Leg cramps    Sulfamethoxazole-Trimethoprim Other (See Comments)     Past Medical History:   Diagnosis Date    Acute renal failure (ARF) (HCC)     Arrhythmia     Arthritis          Chronic pain     arthritis in knees    Coronary artery disease of native artery of native heart with stable angina pectoris ([facility] [address] 07/25/2013    prior thrombotic occlusion of the LAD. No obstructive disease at cath  2002 Aug 2013: Cath no obstructive CAD Apr 2016 - EF 60-65%.  pseudonormal diastolic function, RVSP 43 Jun 2017 - Lexiscan MPI - normal EKG, perfusion and LVEF Oct 2021- Echo - EF 50-55%, mild to mod MR, bi atrial enlargement, mild  RVE, RVSP 44 Aug 2022 - normal perfusion, EF 66%     Dyslipidemia     GERD (gastroesophageal reflux disease)     controlled with med    Glaucoma     History of basal cell cancer     multiple    History of peptic ulcer 2002 and 2014    HTN (hypertension)     controlled with med    Hx of colonic polyps          Macular edema     Followed by Dr. Del Kirkland, [address] [place] [address],[address] [facility]    PUD (peptic ulcer disease)     Sick sinus syndrome ([facility] [address] 06/26/2017 Jun 2017 - 24 hour  Monitor - NSR, occasional PAC, 2-3 episodes brief atrial tachycardia, longest 14 beats, asymptomatic.  2 episodes AIVR during sleep, rate 64, longest 4 beats. No diary entries Nov 2021- dual chamber Medtronic pacemaker    Staph infection 2018    s/p left great toenail removal     Past Surgical History:   Procedure Laterality Date    BLADDER SUSPENSION  2006    CARDIAC CATHETERIZATION  2001, 2002--2013    no intervention-normal    CATARACT REMOVAL Left 2012    with IOL    CATARACT REMOVAL Right 2007    with IOL    CHOLECYSTECTOMY, [address]    and ERCP    COLONOSCOPY  09/23/2014    COLONOSCOPY      multiple    COLONOSCOPY N/A 2/23/2023    COLONOSCOPY WITH BIOPSY/ Polyps performed by Brian Patel MD at [address] N/A 10/11/2022    Insert PPM biv multi performed by Janis Jarvis MD at [facility] CATH LAB    EP DEVICE PROCEDURE N/A 10/11/2022    ABLATION AV NODE performed by Janis Jarvis MD at [address] [place] [address] CATH LAB    HYSTERECTOMY (CERVIX STATUS UNKNOWN)  1990    KNEE ARTHROSCOPY Left 1995    knee scope    KNEE ARTHROSCOPY Right 1996    knee scope    KNEE ARTHROSCOPY Right 2007    knee scope    LUMBAR LAMINECTOMY  1978 and 1979    back surg--x 2      PACEMAKER PLACEMENT  12/03/2021    MD UNLISTED PROCEDURE CARDIAC SURGERY  2021    cardioversion x2    TUBAL LIGATION  1997    UPPER GASTROINTESTINAL ENDOSCOPY      multiple    UPPER GASTROINTESTINAL ENDOSCOPY N/A 2/23/2023    EGD PYLORIC DILATION BALLOON performed by Brian Patel MD at [facility] ENDOSCOPY     Family History   Problem Relation Age of Onset    Alzheimer's Disease Sister     Delayed Awakening Neg Hx     Post-op Nausea/Vomiting Neg Hx     Emergence Delirium Neg Hx     Post-op Cognitive Dysfunction Neg Hx     Other Neg Hx     Lung Disease Father     Colon Cancer Father     Malig Hypertherm Neg Hx     Pseudochol.  Deficiency Neg Hx     Crohn's Disease Mother     Cancer Father         colon     Social History Tobacco Use    Smoking status: Never    Smokeless tobacco: Never   Substance Use Topics    Alcohol use: Yes     Alcohol/week: 7.0 standard drinks     Types: 7 Glasses of wine per week       ROS:    Review of Systems   Cardiovascular:  Negative for chest pain. Respiratory:  Negative for shortness of breath. Musculoskeletal:  Positive for arthritis and joint pain. Neurological:  Positive for paresthesias. PHYSICAL EXAM:   /70   Pulse 72   Ht 5' 7\" (1.702 m)   Wt 171 lb (77.6 kg)   BMI 26.78 kg/m²      Wt Readings from Last 3 Encounters:   03/01/23 171 lb (77.6 kg)   02/23/23 163 lb (73.9 kg)   10/21/22 169 lb (76.7 kg)     BP Readings from Last 3 Encounters:   03/01/23 130/70   02/23/23 122/62   10/21/22 112/72     Pulse Readings from Last 3 Encounters:   03/01/23 72   02/23/23 71   10/21/22 75           Physical Exam  Constitutional:       Appearance: Normal appearance. HENT:      Head: Normocephalic and atraumatic. Eyes:      Conjunctiva/sclera: Conjunctivae normal.   Neck:      Vascular: No carotid bruit. Cardiovascular:      Rate and Rhythm: Normal rate and regular rhythm. Heart sounds: No murmur heard. No friction rub. No gallop. Pulmonary:      Effort: No respiratory distress. Breath sounds: No wheezing or rales. Musculoskeletal:         General: No swelling. Cervical back: Neck supple. Comments: Severe bilateral venous varicosities with dependent discoloration, minimal edema   Skin:     General: Skin is warm and dry. Neurological:      General: No focal deficit present. Mental Status: She is alert. Psychiatric:         Mood and Affect: Mood normal.         Behavior: Behavior normal.       Medical problems and test results were reviewed with the patient today.      DATA REVIEW    LIPID PANEL     Lab Results   Component Value Date    CHOL 176 02/10/2022    CHOL 148 02/05/2021    CHOL 171 02/03/2020     Lab Results   Component Value Date    TRIG 82 02/10/2022    TRIG 73 02/05/2021    TRIG 83 02/03/2020     Lab Results   Component Value Date    HDL 64 02/10/2022    HDL 62 02/05/2021    HDL 68 02/03/2020     Lab Results   Component Value Date    LDLCALC 97 02/10/2022    LDLCALC 72 02/05/2021    LDLCALC 86 02/03/2020     Lab Results   Component Value Date    LABVLDL 17 02/03/2020    VLDL 15 02/10/2022    VLDL 14 02/05/2021     No results found for: CHOLHDLRATIO    BMP  Lab Results   Component Value Date/Time     10/07/2022 10:25 AM    K 4.0 10/07/2022 10:25 AM     10/07/2022 10:25 AM    CO2 29 10/07/2022 10:25 AM    BUN 20 10/07/2022 10:25 AM    CREATININE 0.60 10/07/2022 10:25 AM    GLUCOSE 104 10/07/2022 10:25 AM    CALCIUM 9.4 10/07/2022 10:25 AM      Lab Results   Component Value Date    WBC 8.0 10/07/2022    HGB 13.0 10/07/2022    HCT 40.9 10/07/2022    MCV 93.6 10/07/2022     10/07/2022       EKG        CXR/IMAGING        DEVICE INTERROGATION    1/5/23 - 100% afib, 99% bi-V pacing, no events, normal function    OUTSIDE RECORDS REVIEW    Records from outside providers have been reviewed and summarized as noted in the HPI, past history and data review sections of this note, and reviewed with patient. .       ASSESSMENT and PLAN    Alana Ross was seen today for hypertension. Diagnoses and all orders for this visit:    Sick sinus syndrome ([address]    Permanent atrial fibrillation ([facility] [address]    Pacemaker    Essential hypertension    Coronary artery thrombosis with myocardial infarction [facility])        IMPRESSION:    Remarkable improvement with biventricular pacing, well done     Rate controlled, anticoagulated, continue meds    Reviewed conservative management venous stasis. Neuropathy management per pcp    Normal device function    BP well controlled    No angina        Return in about 1 year (around 3/1/2024). Thank you for allowing me to participate in this patient's care.   Please call or contact me if there are any questions or concerns regarding the above.       Padilla Lorenzo MD  03/01/23  11:21 AM

## 2023-03-01 ENCOUNTER — OFFICE VISIT (OUTPATIENT)
Dept: CARDIOLOGY CLINIC | Age: 83
End: 2023-03-01
Payer: MEDICARE

## 2023-03-01 VITALS
DIASTOLIC BLOOD PRESSURE: 70 MMHG | HEART RATE: 72 BPM | BODY MASS INDEX: 26.84 KG/M2 | WEIGHT: 171 LBS | HEIGHT: 67 IN | SYSTOLIC BLOOD PRESSURE: 130 MMHG

## 2023-03-01 DIAGNOSIS — I48.21 PERMANENT ATRIAL FIBRILLATION (HCC): ICD-10-CM

## 2023-03-01 DIAGNOSIS — I49.5 SICK SINUS SYNDROME (HCC): Primary | ICD-10-CM

## 2023-03-01 DIAGNOSIS — Z95.0 PACEMAKER: ICD-10-CM

## 2023-03-01 DIAGNOSIS — I21.9 CORONARY ARTERY THROMBOSIS WITH MYOCARDIAL INFARCTION (HCC): ICD-10-CM

## 2023-03-01 DIAGNOSIS — I10 ESSENTIAL HYPERTENSION: ICD-10-CM

## 2023-03-01 PROCEDURE — G8417 CALC BMI ABV UP PARAM F/U: HCPCS | Performed by: INTERNAL MEDICINE

## 2023-03-01 PROCEDURE — 3078F DIAST BP <80 MM HG: CPT | Performed by: INTERNAL MEDICINE

## 2023-03-01 PROCEDURE — 1036F TOBACCO NON-USER: CPT | Performed by: INTERNAL MEDICINE

## 2023-03-01 PROCEDURE — 1090F PRES/ABSN URINE INCON ASSESS: CPT | Performed by: INTERNAL MEDICINE

## 2023-03-01 PROCEDURE — 3075F SYST BP GE 130 - 139MM HG: CPT | Performed by: INTERNAL MEDICINE

## 2023-03-01 PROCEDURE — G8400 PT W/DXA NO RESULTS DOC: HCPCS | Performed by: INTERNAL MEDICINE

## 2023-03-01 PROCEDURE — G8427 DOCREV CUR MEDS BY ELIG CLIN: HCPCS | Performed by: INTERNAL MEDICINE

## 2023-03-01 PROCEDURE — 1123F ACP DISCUSS/DSCN MKR DOCD: CPT | Performed by: INTERNAL MEDICINE

## 2023-03-01 PROCEDURE — G8484 FLU IMMUNIZE NO ADMIN: HCPCS | Performed by: INTERNAL MEDICINE

## 2023-03-01 PROCEDURE — 99213 OFFICE O/P EST LOW 20 MIN: CPT | Performed by: INTERNAL MEDICINE

## 2023-03-01 ASSESSMENT — ENCOUNTER SYMPTOMS: SHORTNESS OF BREATH: 0

## 2023-03-01 NOTE — PATIENT INSTRUCTIONS
Patient Education        Atrial Fibrillation: Care Instructions  Your Care Instructions     Atrial fibrillation is an irregular and often fast heartbeat. Treating this condition is important for several reasons. It can cause blood clots, which can travel from your heart to your brain and cause a stroke. If you have a fast heartbeat, you may feel lightheaded, dizzy, and weak. An irregular heartbeat can also increase your risk for heart failure. Atrial fibrillation is often the result of another heart condition, such as high blood pressure or coronary artery disease. Making changes to improve your heart condition will help you stay healthy and active. Follow-up care is a key part of your treatment and safety. Be sure to make and go to all appointments, and call your doctor if you are having problems. It's also a good idea to know your test results and keep a list of the medicines you take. How can you care for yourself at home? Medicines    Take your medicines exactly as prescribed. Call your doctor if you think you are having a problem with your medicine. You will get more details on the specific medicines your doctor prescribes. If your doctor has given you a blood thinner to prevent a stroke, be sure you get instructions about how to take your medicine safely. Blood thinners can cause serious bleeding problems. Do not take any vitamins, over-the-counter drugs, or herbal products without talking to your doctor first.   Lifestyle changes    Do not smoke. Smoking can increase your chance of a stroke and heart attack. If you need help quitting, talk to your doctor about stop-smoking programs and medicines. These can increase your chances of quitting for good. Eat a heart-healthy diet. Stay at a healthy weight. Lose weight if you need to. Limit alcohol to 2 drinks a day for men and 1 drink a day for women. Too much alcohol can cause health problems.      Avoid infections such as COVID-19, colds, and the flu. Get the flu vaccine every year. Get a pneumococcal vaccine. If you have had one before, ask your doctor whether you need another dose. Stay up to date on your COVID-19 vaccines. Activity    If your doctor recommends it, get more exercise. Walking is a good choice. Bit by bit, increase the amount you walk every day. Try for at least 30 minutes on most days of the week. You also may want to swim, bike, or do other activities. Your doctor may suggest that you join a cardiac rehabilitation program so that you can have help increasing your physical activity safely. Start light exercise if your doctor says it is okay. Even a small amount will help you get stronger, have more energy, and manage stress. Walking is an easy way to get exercise. Start out by walking a little more than you did in the hospital. Gradually increase the amount you walk. When you exercise, watch for signs that your heart is working too hard. You are pushing too hard if you cannot talk while you are exercising. If you become short of breath or dizzy or have chest pain, sit down and rest immediately. Check your pulse regularly. Place two fingers on the artery at the palm side of your wrist, in line with your thumb. If your heartbeat seems uneven or fast, talk to your doctor. When should you call for help? Call 911 anytime you think you may need emergency care. For example, call if:    You have symptoms of a heart attack. These may include:  Chest pain or pressure, or a strange feeling in the chest.  Sweating. Shortness of breath. Nausea or vomiting. Pain, pressure, or a strange feeling in the back, neck, jaw, or upper belly or in one or both shoulders or arms. Lightheadedness or sudden weakness. A fast or irregular heartbeat. After you call 911, the  may tell you to chew 1 adult-strength or 2 to 4 low-dose aspirin. Wait for an ambulance. Do not try to drive yourself. You have symptoms of a stroke.  These may include:  Sudden numbness, tingling, weakness, or loss of movement in your face, arm, or leg, especially on only one side of your body. Sudden vision changes. Sudden trouble speaking. Sudden confusion or trouble understanding simple statements. Sudden problems with walking or balance. A sudden, severe headache that is different from past headaches. You passed out (lost consciousness). Call your doctor now or seek immediate medical care if:    You have new or increased shortness of breath. You feel dizzy or lightheaded, or you feel like you may faint. Your heart rate becomes irregular. You can feel your heart flutter in your chest or skip heartbeats. Tell your doctor if these symptoms are new or worse. Watch closely for changes in your health, and be sure to contact your doctor if you have any problems. Where can you learn more? Go to http://www.woods.com/ and enter U020 to learn more about \"Atrial Fibrillation: Care Instructions. \"  Current as of: September 7, 2022               Content Version: 13.5  © 2006-2022 Healthwise[employer]. Care instructions adapted under license by [facility] ([facility]). If you have questions about a medical condition or this instruction, always ask your healthcare professional. [place] [address] any warranty or liability for your use of this information. Please visit [email or web address]. org for more information regarding cardiovascular disease prevention and treatment.

## 2023-03-06 DIAGNOSIS — I49.5 SICK SINUS SYNDROME (HCC): ICD-10-CM

## 2023-03-06 DIAGNOSIS — Z95.0 PACEMAKER: ICD-10-CM

## 2023-03-19 ENCOUNTER — APPOINTMENT (OUTPATIENT)
Dept: GENERAL RADIOLOGY | Age: 83
End: 2023-03-19
Payer: MEDICARE

## 2023-03-19 ENCOUNTER — HOSPITAL ENCOUNTER (EMERGENCY)
Age: 83
Discharge: HOME | End: 2023-03-19
Attending: EMERGENCY MEDICINE
Payer: MEDICARE

## 2023-03-19 VITALS
WEIGHT: 168 LBS | OXYGEN SATURATION: 97 % | HEART RATE: 77 BPM | TEMPERATURE: 97.8 F | DIASTOLIC BLOOD PRESSURE: 87 MMHG | BODY MASS INDEX: 26.31 KG/M2 | SYSTOLIC BLOOD PRESSURE: 125 MMHG | RESPIRATION RATE: 16 BRPM

## 2023-03-19 DIAGNOSIS — S81.801A OPEN WOUND OF RIGHT LOWER EXTREMITY, INITIAL ENCOUNTER: Primary | ICD-10-CM

## 2023-03-19 LAB
BASOPHILS # BLD: 0.1 K/UL (ref 0–0.2)
BASOPHILS NFR BLD: 1 % (ref 0–2)
DIFFERENTIAL METHOD BLD: ABNORMAL
EOSINOPHIL # BLD: 0.1 K/UL (ref 0–0.8)
EOSINOPHIL NFR BLD: 1 % (ref 0.5–7.8)
ERYTHROCYTE [DISTWIDTH] IN BLOOD BY AUTOMATED COUNT: 14.1 % (ref 11.9–14.6)
HCT VFR BLD AUTO: 38.4 % (ref 35.8–46.3)
HGB BLD-MCNC: 12.2 G/DL (ref 11.7–15.4)
IMM GRANULOCYTES # BLD AUTO: 0 K/UL (ref 0–0.5)
IMM GRANULOCYTES NFR BLD AUTO: 0 % (ref 0–5)
LYMPHOCYTES # BLD: 1.1 K/UL (ref 0.5–4.6)
LYMPHOCYTES NFR BLD: 12 % (ref 13–44)
MCH RBC QN AUTO: 28.9 PG (ref 26.1–32.9)
MCHC RBC AUTO-ENTMCNC: 31.8 G/DL (ref 31.4–35)
MCV RBC AUTO: 91 FL (ref 82–102)
MONOCYTES # BLD: 0.7 K/UL (ref 0.1–1.3)
MONOCYTES NFR BLD: 8 % (ref 4–12)
NEUTS SEG # BLD: 7.1 K/UL (ref 1.7–8.2)
NEUTS SEG NFR BLD: 78 % (ref 43–78)
NRBC # BLD: 0 K/UL (ref 0–0.2)
PLATELET # BLD AUTO: 238 K/UL (ref 150–450)
PMV BLD AUTO: 10.3 FL (ref 9.4–12.3)
RBC # BLD AUTO: 4.22 M/UL (ref 4.05–5.2)
WBC # BLD AUTO: 9.2 K/UL (ref 4.3–11.1)

## 2023-03-19 PROCEDURE — 90714 TD VACC NO PRESV 7 YRS+ IM: CPT | Performed by: STUDENT IN AN ORGANIZED HEALTH CARE EDUCATION/TRAINING PROGRAM

## 2023-03-19 PROCEDURE — 6370000000 HC RX 637 (ALT 250 FOR IP): Performed by: STUDENT IN AN ORGANIZED HEALTH CARE EDUCATION/TRAINING PROGRAM

## 2023-03-19 PROCEDURE — 85025 COMPLETE CBC W/AUTO DIFF WBC: CPT

## 2023-03-19 PROCEDURE — 73590 X-RAY EXAM OF LOWER LEG: CPT

## 2023-03-19 PROCEDURE — 90471 IMMUNIZATION ADMIN: CPT | Performed by: STUDENT IN AN ORGANIZED HEALTH CARE EDUCATION/TRAINING PROGRAM

## 2023-03-19 PROCEDURE — 99284 EMERGENCY DEPT VISIT MOD MDM: CPT

## 2023-03-19 PROCEDURE — 6360000002 HC RX W HCPCS: Performed by: STUDENT IN AN ORGANIZED HEALTH CARE EDUCATION/TRAINING PROGRAM

## 2023-03-19 RX ORDER — ONDANSETRON 4 MG/1
4 TABLET, FILM COATED ORAL 3 TIMES DAILY PRN
Qty: 15 TABLET | Refills: 0 | Status: SHIPPED | OUTPATIENT
Start: 2023-03-19

## 2023-03-19 RX ORDER — DOXYCYCLINE HYCLATE 100 MG
100 TABLET ORAL 2 TIMES DAILY
Qty: 14 TABLET | Refills: 0 | Status: SHIPPED | OUTPATIENT
Start: 2023-03-19 | End: 2023-03-26

## 2023-03-19 RX ORDER — HYDROCODONE BITARTRATE AND ACETAMINOPHEN 5; 325 MG/1; MG/1
1 TABLET ORAL EVERY 6 HOURS PRN
Qty: 12 TABLET | Refills: 0 | Status: SHIPPED | OUTPATIENT
Start: 2023-03-19 | End: 2023-03-22

## 2023-03-19 RX ORDER — HYDROCODONE BITARTRATE AND ACETAMINOPHEN 5; 325 MG/1; MG/1
1 TABLET ORAL
Status: COMPLETED | OUTPATIENT
Start: 2023-03-19 | End: 2023-03-19

## 2023-03-19 RX ORDER — DOXYCYCLINE HYCLATE 100 MG/1
100 CAPSULE ORAL
Status: COMPLETED | OUTPATIENT
Start: 2023-03-19 | End: 2023-03-19

## 2023-03-19 RX ADMIN — HYDROCODONE BITARTRATE AND ACETAMINOPHEN 1 TABLET: 5; 325 TABLET ORAL at 11:03

## 2023-03-19 RX ADMIN — DOXYCYCLINE HYCLATE 100 MG: 100 CAPSULE ORAL at 11:03

## 2023-03-19 RX ADMIN — CLOSTRIDIUM TETANI TOXOID ANTIGEN (FORMALDEHYDE INACTIVATED) AND CORYNEBACTERIUM DIPHTHERIAE TOXOID ANTIGEN (FORMALDEHYDE INACTIVATED) 0.5 ML: 5; 2 INJECTION, SUSPENSION INTRAMUSCULAR at 11:03

## 2023-03-19 ASSESSMENT — ENCOUNTER SYMPTOMS
EYE DISCHARGE: 0
SHORTNESS OF BREATH: 0
ABDOMINAL PAIN: 0
PHOTOPHOBIA: 0
TROUBLE SWALLOWING: 0
RHINORRHEA: 0
SORE THROAT: 0
VOMITING: 0
FACIAL SWELLING: 0
EYE PAIN: 0
COUGH: 0
APNEA: 0
CHEST TIGHTNESS: 0
EYE REDNESS: 0
VOICE CHANGE: 0
WHEEZING: 0
DIARRHEA: 0

## 2023-03-19 NOTE — DISCHARGE INSTRUCTIONS
Your wound appears to be slowly getting infected. Take prescribed antibiotics for the full 7 days to treat this infection. Use Norco as needed for pain. Use Zofran when taking the Norco as it can make you slightly nauseous. I do not recommend using hydrogen peroxide as this may delay wound healing. I recommend cleaning your wound daily with soap and water and then dressing it with a clean dressing. I recommend following up with your family doctor in 2 to 3 days to make sure wound is improving. Return here for new or worsening symptoms. As we discussed, I did not find a life threatening cause of your symptoms today. However, THAT DOES NOT MEAN IT COULD NOT DEVELOP. If you develop ANY new or worsening symptoms, it is critical that you return for re-evaluation. This includes any symptoms that are concerning to you, especially symptoms such as fevers, red streaking up your leg, purulent drainage, feeling that your heart is racing. If you do not return for re-evaluation, you risk serious complications, including death.

## 2023-03-19 NOTE — ED PROVIDER NOTES
Emergency Department Provider Note                   PCP:                Nelson Bentley MD               Age: 79 y.o. Sex: female     DISPOSITION Decision To Discharge 03/19/2023 11:49:46 AM       ICD-10-CM    1. Open wound of right lower extremity, initial encounter  S81.801A           MEDICAL DECISION MAKING  Complexity of Problems Addressed:  1 or more acute illnesses that pose a threat to life or bodily function. Data Reviewed and Analyzed:  Category 1:   I reviewed records from an external source: previous lab results from outside ED. I reviewed records from an external source: Urgent care visit from today  I ordered each unique test.  I interpreted the results of each unique test.    The patients assessment required an independent historian: Patient's     Category 2:   I independently ordered and interpreted the X-rays. Agree with radiology  I independently ordered and interpreted the lab work    Category 3: Discussion of management or test interpretation. In summary this is an 54-year-old female patient who presented today for evaluation of laceration to the right lower extremity. Laceration occurred 3 days ago. No indication for closure. We will let this wound heal by secondary intention. The wound is starting to have some surrounding erythema and warmth consistent with simple cellulitis. No significant spread while the patient was here to suggest necrotizing fasciitis. X-ray was performed without signs of subcutaneous gas. There is no crepitus on exam.  Low suspicion for this diagnosis at this time. No signs of compartment syndrome. No lymphangitic streaking to suggest lymphangitis. No signs of foreign body. Vitals are stable and she does not meet SIRS criteria. Afebrile. No tachycardia. Tetanus shot was updated. Plan will be outpatient management with antibiotics. Wound was cleaned and dressed today.   Advised follow-up in 2 to 3 days at an urgent care or here or

## 2023-03-19 NOTE — ED NOTES
I have reviewed discharge instructions with the patient. The patient verbalized understanding. Patient left ED via Discharge Method: ambulatory to Home with spouse    Opportunity for questions and clarification provided. Patient given 2 scripts. To continue your aftercare when you leave the hospital, you may receive an automated call from our care team to check in on how you are doing. This is a free service and part of our promise to provide the best care and service to meet your aftercare needs.  If you have questions, or wish to unsubscribe from this service please call [phone]. Thank you for Choosing our [employer] Emergency Department.         Alexandre Carvajal RN  03/19/23 0006

## 2023-03-19 NOTE — ED TRIAGE NOTES
Pt states she cut her R shin on a clothing rack 3 days ago and now the area feels hot to the touch and is red and painful.  Denies fevers, states symptoms started yesterday, went to urgent care this morning where they told her to go to the ER to get looked at,

## 2023-03-21 ENCOUNTER — OFFICE VISIT (OUTPATIENT)
Dept: ENT CLINIC | Age: 83
End: 2023-03-21

## 2023-03-21 VITALS
BODY MASS INDEX: 26.37 KG/M2 | HEIGHT: 67 IN | DIASTOLIC BLOOD PRESSURE: 80 MMHG | SYSTOLIC BLOOD PRESSURE: 128 MMHG | WEIGHT: 168 LBS

## 2023-03-21 DIAGNOSIS — R51.9 FACE PAIN: Primary | ICD-10-CM

## 2023-03-21 DIAGNOSIS — Z85.828 HISTORY OF BASAL CELL CARCINOMA: ICD-10-CM

## 2023-03-21 DIAGNOSIS — Z82.0: ICD-10-CM

## 2023-03-21 RX ORDER — DOXYCYCLINE HYCLATE 100 MG/1
100 CAPSULE ORAL 2 TIMES DAILY
COMMUNITY
End: 2023-03-21

## 2023-03-21 ASSESSMENT — ENCOUNTER SYMPTOMS
ABDOMINAL PAIN: 0
COUGH: 0
EYE DISCHARGE: 0

## 2023-03-21 NOTE — PROGRESS NOTES
teeth are normal  Oral Cavity: normal oral mucosa, no visualized ulcers, masses or other lesions,  Palate normal, Tongue normal, Floor of mouth normal. Mallampati Class 1 . Oropharynx: Oropharynx normal and unobstructed, tonsils are  + 1 , no lesion or inflammation. Neck/Thyroid: Normal appearance without mass, Trachea midline, No lymphadenopathy, No enlargement, tenderness or mass of thyroid noted. Right left is bandaged and oozing green and yellow. Edematous and erythematous ankle. Procedure: Procedure nasal endoscopy :  ENT Nasal/Sinus Endoscopy/Nasopharyngoscopy:    Informed Consent: Consent was obtained    Anesthesia/Decongestant: Phenylephrine, Lidocaine as indicated    Procedure: Due to the limitations of anterior rhinoscopy for adequate visualization of the posterior nasal cavity/cavities, sinus area(s), and/or nasopharynx, nasal and sinus endoscopy was performed. Unless otherwise specified, interior of the nasal cavity and the inferior, middle and superior meatus/meati, the turbinates, the spheno-ethmoid recess, and the nasopharynx were all inspected. Pertinent positives are noted in exam findings. Findings:  Nasal/sinus endoscopy: Endoscopic examination of the nose was done under local anesthesia with topical phenylephrine and lidocaine solution as needed, septum-- no deviation, inferior turbinate hypertrophy before topical decongestant normal,  middle/superior turbinate-- normal, inferior/middle/superior meatus-no mucopus or polyps, normal mucosa, sphenoethmoid recess-- no mucopus or polyps, no mucus tracking posteriorly to the nasopharynx, Nasopharyngoscopy demonstrates normal appearing eustachian tube openings and fossa of Rosenmuller and central nasopharynx. Complications: The patient had no complications during or immediately following the procedure, The patient tolerated the procedure well. Assessment/Plan:  1. Face pain  -     NASAL ENDOSCOPY,DX  2.  History of basal cell

## 2023-03-23 ENCOUNTER — OFFICE VISIT (OUTPATIENT)
Dept: FAMILY MEDICINE CLINIC | Facility: CLINIC | Age: 83
End: 2023-03-23
Payer: MEDICARE

## 2023-03-23 VITALS
HEART RATE: 75 BPM | WEIGHT: 168 LBS | TEMPERATURE: 98.1 F | DIASTOLIC BLOOD PRESSURE: 70 MMHG | HEIGHT: 67 IN | OXYGEN SATURATION: 96 % | BODY MASS INDEX: 26.37 KG/M2 | SYSTOLIC BLOOD PRESSURE: 124 MMHG

## 2023-03-23 DIAGNOSIS — L03.116 CELLULITIS OF LEFT LOWER EXTREMITY: ICD-10-CM

## 2023-03-23 DIAGNOSIS — F32.0 MAJOR DEPRESSIVE DISORDER, SINGLE EPISODE, MILD (HCC): Primary | ICD-10-CM

## 2023-03-23 PROCEDURE — 99213 OFFICE O/P EST LOW 20 MIN: CPT | Performed by: FAMILY MEDICINE

## 2023-03-23 PROCEDURE — 3078F DIAST BP <80 MM HG: CPT | Performed by: FAMILY MEDICINE

## 2023-03-23 PROCEDURE — G8417 CALC BMI ABV UP PARAM F/U: HCPCS | Performed by: FAMILY MEDICINE

## 2023-03-23 PROCEDURE — G8400 PT W/DXA NO RESULTS DOC: HCPCS | Performed by: FAMILY MEDICINE

## 2023-03-23 PROCEDURE — G8427 DOCREV CUR MEDS BY ELIG CLIN: HCPCS | Performed by: FAMILY MEDICINE

## 2023-03-23 PROCEDURE — 1036F TOBACCO NON-USER: CPT | Performed by: FAMILY MEDICINE

## 2023-03-23 PROCEDURE — G8484 FLU IMMUNIZE NO ADMIN: HCPCS | Performed by: FAMILY MEDICINE

## 2023-03-23 PROCEDURE — 1123F ACP DISCUSS/DSCN MKR DOCD: CPT | Performed by: FAMILY MEDICINE

## 2023-03-23 PROCEDURE — 1090F PRES/ABSN URINE INCON ASSESS: CPT | Performed by: FAMILY MEDICINE

## 2023-03-23 PROCEDURE — 3074F SYST BP LT 130 MM HG: CPT | Performed by: FAMILY MEDICINE

## 2023-03-23 RX ORDER — CLINDAMYCIN HYDROCHLORIDE 300 MG/1
300 CAPSULE ORAL 3 TIMES DAILY
Qty: 30 CAPSULE | Refills: 0 | Status: SHIPPED | OUTPATIENT
Start: 2023-03-23 | End: 2023-04-02

## 2023-03-23 SDOH — ECONOMIC STABILITY: FOOD INSECURITY: WITHIN THE PAST 12 MONTHS, YOU WORRIED THAT YOUR FOOD WOULD RUN OUT BEFORE YOU GOT MONEY TO BUY MORE.: NEVER TRUE

## 2023-03-23 SDOH — ECONOMIC STABILITY: FOOD INSECURITY: WITHIN THE PAST 12 MONTHS, THE FOOD YOU BOUGHT JUST DIDN'T LAST AND YOU DIDN'T HAVE MONEY TO GET MORE.: NEVER TRUE

## 2023-03-23 SDOH — ECONOMIC STABILITY: HOUSING INSECURITY
IN THE LAST 12 MONTHS, WAS THERE A TIME WHEN YOU DID NOT HAVE A STEADY PLACE TO SLEEP OR SLEPT IN A SHELTER (INCLUDING NOW)?: NO

## 2023-03-23 SDOH — ECONOMIC STABILITY: INCOME INSECURITY: HOW HARD IS IT FOR YOU TO PAY FOR THE VERY BASICS LIKE FOOD, HOUSING, MEDICAL CARE, AND HEATING?: NOT HARD AT ALL

## 2023-03-23 ASSESSMENT — PATIENT HEALTH QUESTIONNAIRE - PHQ9
SUM OF ALL RESPONSES TO PHQ QUESTIONS 1-9: 0
10. IF YOU CHECKED OFF ANY PROBLEMS, HOW DIFFICULT HAVE THESE PROBLEMS MADE IT FOR YOU TO DO YOUR WORK, TAKE CARE OF THINGS AT HOME, OR GET ALONG WITH OTHER PEOPLE: 0
1. LITTLE INTEREST OR PLEASURE IN DOING THINGS: 0
SUM OF ALL RESPONSES TO PHQ9 QUESTIONS 1 & 2: 0
SUM OF ALL RESPONSES TO PHQ QUESTIONS 1-9: 0
8. MOVING OR SPEAKING SO SLOWLY THAT OTHER PEOPLE COULD HAVE NOTICED. OR THE OPPOSITE, BEING SO FIGETY OR RESTLESS THAT YOU HAVE BEEN MOVING AROUND A LOT MORE THAN USUAL: 0
2. FEELING DOWN, DEPRESSED OR HOPELESS: 0
5. POOR APPETITE OR OVEREATING: 0
3. TROUBLE FALLING OR STAYING ASLEEP: 0
SUM OF ALL RESPONSES TO PHQ QUESTIONS 1-9: 0
9. THOUGHTS THAT YOU WOULD BE BETTER OFF DEAD, OR OF HURTING YOURSELF: 0
4. FEELING TIRED OR HAVING LITTLE ENERGY: 0
7. TROUBLE CONCENTRATING ON THINGS, SUCH AS READING THE NEWSPAPER OR WATCHING TELEVISION: 0
6. FEELING BAD ABOUT YOURSELF - OR THAT YOU ARE A FAILURE OR HAVE LET YOURSELF OR YOUR FAMILY DOWN: 0
SUM OF ALL RESPONSES TO PHQ QUESTIONS 1-9: 0

## 2023-03-23 ASSESSMENT — ENCOUNTER SYMPTOMS
GASTROINTESTINAL NEGATIVE: 1
RESPIRATORY NEGATIVE: 1

## 2023-03-23 NOTE — PROGRESS NOTES
0.4 MG SL tablet DISSOLVE 1 TABLET UNDER TONGUE EVERY 5 MINUTES AS NEEDED FOR CHEST PAIN      sertraline (ZOLOFT) 50 MG tablet Take 50 mg by mouth every other day      pantoprazole (PROTONIX) 40 MG tablet daily (Patient not taking: Reported on 3/23/2023)       No current facility-administered medications on file prior to visit. Past Medical History:   Diagnosis Date    Acute renal failure (ARF) (HCC)     Arrhythmia     Arthritis          Chronic pain     arthritis in knees    Coronary artery disease of native artery of native heart with stable angina pectoris ([facility] 07/25/2013    prior thrombotic occlusion of the LAD. No obstructive disease at cath  2002 Aug 2013: Cath no obstructive CAD Apr 2016 - EF 60-65%. pseudonormal diastolic function, RVSP 43 Jun 2017 - Lexiscan MPI - normal EKG, perfusion and LVEF Oct 2021- Echo - EF 50-55%, mild to mod MR, bi atrial enlargement, mild  RVE, RVSP 44 Aug 2022 - normal perfusion, EF 66%     Dyslipidemia     GERD (gastroesophageal reflux disease)     controlled with med    Glaucoma     History of basal cell cancer     multiple    History of peptic ulcer 2002 and 2014    HTN (hypertension)     controlled with med    Hx of colonic polyps          Macular edema     Followed by [facility]    PUD (peptic ulcer disease)     Sick sinus syndrome ([facility] 06/26/2017 Jun 2017 - 24 hour  Monitor - NSR, occasional PAC, 2-3 episodes brief atrial tachycardia, longest 14 beats, asymptomatic.  2 episodes AIVR during sleep, rate 64, longest 4 beats. No diary entries Nov 2021- dual chamber Medtronic pacemaker    Staph infection 2018    s/p left great toenail removal       Review of Systems   Constitutional: Negative. Respiratory: Negative. Cardiovascular: Negative. Gastrointestinal: Negative. Genitourinary: Negative. Musculoskeletal: Negative. Neurological: Negative. Psychiatric/Behavioral:  Negative for dysphoric mood (controlled).  The patient is not

## 2023-03-28 PROCEDURE — 93296 REM INTERROG EVL PM/IDS: CPT | Performed by: INTERNAL MEDICINE

## 2023-03-28 PROCEDURE — 93294 REM INTERROG EVL PM/LDLS PM: CPT | Performed by: INTERNAL MEDICINE

## 2023-03-30 DIAGNOSIS — N39.0 RECURRENT UTI: ICD-10-CM

## 2023-03-30 RX ORDER — METHENAMINE HIPPURATE 1000 MG/1
TABLET ORAL
Qty: 180 TABLET | Refills: 3 | Status: SHIPPED | OUTPATIENT
Start: 2023-03-30

## 2023-03-31 ENCOUNTER — OFFICE VISIT (OUTPATIENT)
Dept: CARDIOLOGY CLINIC | Age: 83
End: 2023-03-31

## 2023-03-31 VITALS
BODY MASS INDEX: 27 KG/M2 | HEART RATE: 73 BPM | WEIGHT: 172 LBS | HEIGHT: 67 IN | SYSTOLIC BLOOD PRESSURE: 120 MMHG | DIASTOLIC BLOOD PRESSURE: 78 MMHG

## 2023-03-31 DIAGNOSIS — R00.1 BRADYCARDIA: Primary | ICD-10-CM

## 2023-03-31 DIAGNOSIS — S89.91XA INJURY OF RIGHT LOWER EXTREMITY, INITIAL ENCOUNTER: ICD-10-CM

## 2023-03-31 ASSESSMENT — ENCOUNTER SYMPTOMS
RESPIRATORY NEGATIVE: 1
EYES NEGATIVE: 1
ALLERGIC/IMMUNOLOGIC NEGATIVE: 1
GASTROINTESTINAL NEGATIVE: 1

## 2023-03-31 NOTE — PROGRESS NOTES
to:  Review of Systems   Constitutional: Negative. HENT: Negative. Eyes: Negative. Respiratory: Negative. Cardiovascular: Negative. Gastrointestinal: Negative. Endocrine: Negative. Genitourinary: Negative. Musculoskeletal: Negative. Skin: Negative. Allergic/Immunologic: Negative. Neurological: Negative. Hematological: Negative. Psychiatric/Behavioral: Negative. All other systems reviewed and are negative. PHYSICAL EXAM:   Ht 5' 7\" (1.702 m)   Wt 172 lb (78 kg)   BMI 26.94 kg/m²      Wt Readings from Last 3 Encounters:   03/31/23 172 lb (78 kg)   03/23/23 168 lb (76.2 kg)   03/21/23 168 lb (76.2 kg)     BP Readings from Last 3 Encounters:   03/23/23 124/70   03/21/23 128/80   03/19/23 125/87     Gen: Well appearing, well developed, no acute distress  Eyes: Pupils equal, round. Extraocular movements are intact  ENT: Oropharynx clear, no oral lesions, normal dentition  CV: S1S2, IRIR, no murmurs, rubs or gallops, normal JVD, no carotid bruits, normal distal pulses, no WADE, left sided CIED C/D/I. Pulm: Clear to auscultation bilaterally, no accessory muscle uses, no wheezes or rales  GI: Soft, NT, ND, +BS  Neuro: Alert and oriented, nonfocal  Psych: Appropriate affect  Skin: Normal color and skin turgor  MSK: Normal muscle bulk and tone    Medical problems and test results were reviewed with the patient today. No results found for any visits on 03/31/23.

## 2023-04-04 ENCOUNTER — PATIENT MESSAGE (OUTPATIENT)
Dept: FAMILY MEDICINE CLINIC | Facility: CLINIC | Age: 83
End: 2023-04-04

## 2023-04-05 NOTE — TELEPHONE ENCOUNTER
Patient sent message today regarding possible cellulitis on her leg due to the wound. Her antibiotics finish up today but her leg is red, swollen, and warm to the touch. The wound itself is healing. Does she need antibiotics again?     Please call patient and let her know what to do

## 2023-04-07 ENCOUNTER — OFFICE VISIT (OUTPATIENT)
Dept: FAMILY MEDICINE CLINIC | Facility: CLINIC | Age: 83
End: 2023-04-07

## 2023-04-07 VITALS
DIASTOLIC BLOOD PRESSURE: 82 MMHG | OXYGEN SATURATION: 98 % | RESPIRATION RATE: 16 BRPM | HEIGHT: 67 IN | HEART RATE: 71 BPM | BODY MASS INDEX: 26.84 KG/M2 | SYSTOLIC BLOOD PRESSURE: 140 MMHG | WEIGHT: 171 LBS | TEMPERATURE: 97.5 F

## 2023-04-07 DIAGNOSIS — L03.115 CELLULITIS OF RIGHT LOWER EXTREMITY: Primary | ICD-10-CM

## 2023-04-07 RX ORDER — CLINDAMYCIN HYDROCHLORIDE 300 MG/1
300 CAPSULE ORAL 3 TIMES DAILY
Qty: 30 CAPSULE | Refills: 0 | Status: SHIPPED | OUTPATIENT
Start: 2023-04-07 | End: 2023-04-17

## 2023-04-07 ASSESSMENT — PATIENT HEALTH QUESTIONNAIRE - PHQ9
3. TROUBLE FALLING OR STAYING ASLEEP: 0
8. MOVING OR SPEAKING SO SLOWLY THAT OTHER PEOPLE COULD HAVE NOTICED. OR THE OPPOSITE, BEING SO FIGETY OR RESTLESS THAT YOU HAVE BEEN MOVING AROUND A LOT MORE THAN USUAL: 0
4. FEELING TIRED OR HAVING LITTLE ENERGY: 0
7. TROUBLE CONCENTRATING ON THINGS, SUCH AS READING THE NEWSPAPER OR WATCHING TELEVISION: 0
1. LITTLE INTEREST OR PLEASURE IN DOING THINGS: 0
SUM OF ALL RESPONSES TO PHQ9 QUESTIONS 1 & 2: 0
9. THOUGHTS THAT YOU WOULD BE BETTER OFF DEAD, OR OF HURTING YOURSELF: 0
SUM OF ALL RESPONSES TO PHQ QUESTIONS 1-9: 0
10. IF YOU CHECKED OFF ANY PROBLEMS, HOW DIFFICULT HAVE THESE PROBLEMS MADE IT FOR YOU TO DO YOUR WORK, TAKE CARE OF THINGS AT HOME, OR GET ALONG WITH OTHER PEOPLE: 0
SUM OF ALL RESPONSES TO PHQ QUESTIONS 1-9: 0
SUM OF ALL RESPONSES TO PHQ QUESTIONS 1-9: 0
5. POOR APPETITE OR OVEREATING: 0
6. FEELING BAD ABOUT YOURSELF - OR THAT YOU ARE A FAILURE OR HAVE LET YOURSELF OR YOUR FAMILY DOWN: 0
SUM OF ALL RESPONSES TO PHQ QUESTIONS 1-9: 0
2. FEELING DOWN, DEPRESSED OR HOPELESS: 0

## 2023-04-07 ASSESSMENT — ENCOUNTER SYMPTOMS
GASTROINTESTINAL NEGATIVE: 1
RESPIRATORY NEGATIVE: 1

## 2023-04-07 NOTE — PROGRESS NOTES
nursing note reviewed. Cardiovascular:      Rate and Rhythm: Normal rate and regular rhythm. Heart sounds: Normal heart sounds. Pulmonary:      Breath sounds: Normal breath sounds. Musculoskeletal:      Cervical back: Neck supple. Lymphadenopathy:      Cervical: No cervical adenopathy. Skin:     Comments: Edges of the wound on the right shin have healed together well. Good granulation tissue forming. Residual redness, tenderness and puffiness on the upper aspect of the wound. Neurovascular is intact. ASSESSMENT and PLAN    Dharmesh Wills was seen today for wound check. Diagnoses and all orders for this visit:    Cellulitis of right lower extremity  -     clindamycin (CLEOCIN) 300 MG capsule; Take 1 capsule by mouth 3 times daily for 10 days     Continue excellent wound care. Resume antibiotic. Recheck next week; sooner if further symptoms develop. Return in about 5 days (around 4/12/2023), or if symptoms worsen or fail to improve.     Ferdinand Sosa MD

## 2023-04-20 ENCOUNTER — HOSPITAL ENCOUNTER (OUTPATIENT)
Dept: WOUND CARE | Age: 83
Discharge: HOME | End: 2023-04-20
Payer: MEDICARE

## 2023-04-20 VITALS — BODY MASS INDEX: 27.48 KG/M2 | HEIGHT: 66 IN | WEIGHT: 171 LBS

## 2023-04-20 DIAGNOSIS — I89.0 LYMPHEDEMA: ICD-10-CM

## 2023-04-20 DIAGNOSIS — S81.801D TRAUMATIC OPEN WOUND OF LOWER LEG, RIGHT, SUBSEQUENT ENCOUNTER: ICD-10-CM

## 2023-04-20 DIAGNOSIS — L30.9 DERMATITIS: ICD-10-CM

## 2023-04-20 DIAGNOSIS — I87.331 CHRONIC VENOUS HTN W ULCER AND INFLAMMATION OF R LOW EXTREM (HCC): ICD-10-CM

## 2023-04-20 PROCEDURE — 99204 OFFICE O/P NEW MOD 45 MIN: CPT | Performed by: SURGERY

## 2023-04-20 PROCEDURE — 29581 APPL MULTLAYER CMPRN SYS LEG: CPT

## 2023-04-20 PROCEDURE — 99203 OFFICE O/P NEW LOW 30 MIN: CPT

## 2023-04-20 NOTE — WOUND CARE
Discharge Instructions for  Sangeetha Solorio  [address]  [address], [address]  Phone [phone]   Fax [phone]      NAME:  Jackie Jovel OF BIRTH:  1940  MEDICAL RECORD NUMBER:  [id number]  DATE:  4/20/2023    Return Appointment:   1 week with Renny Collier MD  Clinician visit Monday 24th    Instructions: Right lower leg:  Hydrofera Blue: Cut to wound size, moisten with saline, and apply to wound bed, ABD, roll gauze. 2 layer compression with wound and lower extremity assessment. If there is any problem with the dressing (too tight, slides down, etc.) Patient to return to wound clinic to have re-wrapped by clinician. Cancel visit with Dermatologist today. [facility] cover at [facility]. Elevate leg when sitting. Should you experience increased redness, swelling, pain, foul odor, size of wound(s), or have a temperature over 101 degrees please contact the [address] [place] [address] Road at [phone] or if after hours contact your primary care physician or go to the hospital emergency department. PLEASE NOTE: IF YOU ARE UNABLE TO OBTAIN WOUND SUPPLIES, CONTINUE TO USE THE SUPPLIES YOU HAVE AVAILABLE UNTIL YOU ARE ABLE TO REACH US. IT IS MOST IMPORTANT TO KEEP THE WOUND COVERED AT ALL TIMES.     Electronically signed Yoly Freeman RN on 4/20/2023 at 11:13 AM

## 2023-04-20 NOTE — FLOWSHEET NOTE
04/20/23 1100   Right Leg Edema Point of Measurement   Leg circumference 39 cm   Ankle circumference 24 cm   Foot circumference 22.5 cm   Compression Therapy 2 layer compression wrap   Wound 04/20/23 Pretibial Right; Anterior; Lower #1   Date First Assessed/Time First Assessed: 04/20/23 1053   Present on Hospital Admission: Yes  Wound Approximate Age at First Assessment (Weeks): 6 weeks  Primary Wound Type: Venous Ulcer  Location: Pretibial  Wound Location Orientation: Right; Anterior;. ..    Wound Image    Wound Etiology Venous   Dressing Status Old drainage noted   Wound Cleansed Cleansed with saline   Dressing/Treatment Petroleum impregnated gauze   Wound Length (cm) 9 cm   Wound Width (cm) 10 cm   Wound Depth (cm) 0.1 cm   Wound Surface Area (cm^2) 90 cm^2   Wound Volume (cm^3) 9 cm^3   Wound Assessment [place]/red;Slough   Drainage Amount Moderate   Drainage Description Serosanguinous   Odor None   Melba-wound Assessment Blanchable erythema   Margins Attached edges   Wound Thickness Description not for Pressure Injury Full thickness

## 2023-04-20 NOTE — PROGRESS NOTES
sclera are not injected. The conjunctive are clear. The eyelids are normal. There is no scleral icterus. ENMT: There are no obvious external ear, nose, lip or mouth lesions. Nares normal; Neck: Overall contour of the neck is normal with no obvious neck masses. Gross hearing is within normal limits. No obvious neck masses  Resp:  Breathing is non-labored; normal rate and effort; no audible wheezing. CV: RRR;  no JVD; No evidence of cyanosis of the upper extremities. The extremities are perfused without embolic sign, splinter hemorrhages, or petechia. GI: soft and non-distended without acute abnormality noted. Musculoskeletal: unremarkable with normal function. No embolic signs or cyanosis. Neuro:  Oriented; moves all 4; no focal deficits  Psychiatric: Judgement and insight are within normal limits for the wound care population of patients. Patient is oriented to person, place, and time. Recent and remote memory are within normal limits. Mood and affect are within normal limits. Integumentary (Skin/Wounds)  See inspection of wound(s) below. There are no other skin areas of palpable concern. See wound center documentation including photos in the [address] Wound Template made part of this record by reference. The wound is as shown below in the photo with more than 1 open area. The borders serpiginous. There is surrounding dermatitis. She has edema. No cellulitis. Wound Care Documentation:    Wound 04/20/23 Pretibial Right; Anterior; Lower #1 (Active)   Number of days: 0              Amount and/or Complexity of Data Reviewed and Analyzed:  I reviewed and analyzed all of the unique labs and radiologic studies that are shown below as well as any that are in the HPI, and any that are in the expanded problem list below  *Each unique test, order, or document contributes to the combination of 2 or combination of 3 in Category 1 below.   I also independently reviewed radiology images for

## 2023-04-24 ENCOUNTER — HOSPITAL ENCOUNTER (OUTPATIENT)
Dept: WOUND CARE | Age: 83
Discharge: HOME | End: 2023-04-24
Payer: MEDICARE

## 2023-04-24 VITALS
DIASTOLIC BLOOD PRESSURE: 72 MMHG | RESPIRATION RATE: 18 BRPM | WEIGHT: 171 LBS | BODY MASS INDEX: 27.48 KG/M2 | OXYGEN SATURATION: 96 % | HEART RATE: 72 BPM | SYSTOLIC BLOOD PRESSURE: 118 MMHG | HEIGHT: 66 IN

## 2023-04-24 PROCEDURE — 29581 APPL MULTLAYER CMPRN SYS LEG: CPT

## 2023-04-24 NOTE — FLOWSHEET NOTE
04/24/23 1054   Right Leg Edema Point of Measurement   Leg circumference 37 cm   Ankle circumference 22 cm   Foot circumference 22 cm   Compression Therapy 2 layer compression wrap   Wound 04/20/23 Pretibial Right; Anterior; Lower #1   Date First Assessed/Time First Assessed: 04/20/23 1053   Present on Hospital Admission: Yes  Wound Approximate Age at First Assessment (Weeks): 6 weeks  Primary Wound Type: Venous Ulcer  Location: Pretibial  Wound Location Orientation: Right; Anterior;. ..    Wound Image    Wound Etiology Venous   Dressing Status Old drainage noted   Wound Cleansed Cleansed with saline   Dressing/Treatment Hydrofera blue   Wound Length (cm) 8 cm   Wound Width (cm) 8.5 cm   Wound Depth (cm) 0.1 cm   Wound Surface Area (cm^2) 68 cm^2   Change in Wound Size % (l*w) 24.44   Wound Volume (cm^3) 6.8 cm^3   Wound Healing % 24   Wound Assessment [place]/red;Slough   Drainage Amount Moderate   Drainage Description Serosanguinous   Odor None   Melba-wound Assessment Hemosiderin staining (brown yellow)   Wound Thickness Description not for Pressure Injury Full thickness     Patient is currently taking Eliquis

## 2023-04-24 NOTE — WOUND CARE
Multilayer Compression Wrap   (Not Unna) Below the Knee    NAME:  Destiney ADAMS Solddiomedes  YOB: 1940  MEDICAL RECORD NUMBER:  [id number]  DATE:  4/24/2023    Multilayer compression wrap: Removed old Multilayer wrap if indicated and wash leg with mild soap/water. Applied moisturizing agent to dry skin as needed. Applied primary and secondary dressing as ordered. Applied multilayered dressing below the knee to right lower leg. Instructed patient/caregiver not to remove dressing and to keep it clean and dry. Instructed patient/caregiver on complications to report to provider, such as pain, numbness in toes, heavy drainage, and slippage of dressing. Instructed patient on purpose of compression dressing and on activity and exercise recommendations.       Electronically signed by Mery Campos PT, [facility] on 4/24/2023 at 10:59 AM

## 2023-04-26 ENCOUNTER — PATIENT MESSAGE (OUTPATIENT)
Dept: FAMILY MEDICINE CLINIC | Facility: CLINIC | Age: 83
End: 2023-04-26

## 2023-04-26 NOTE — TELEPHONE ENCOUNTER
From: Lamine Brown  To: Dr. Katie Owusu  Sent: 2023 11:08 AM EDT  Subject: Prescription refill    Sorin on [facility]enny Singletary needs a renewal for my prescription for Sertraline, 50 mgm daily,  quantity, 90.   1940 thank you Silvestre Perkins

## 2023-04-27 ENCOUNTER — HOSPITAL ENCOUNTER (OUTPATIENT)
Dept: WOUND CARE | Age: 83
Discharge: HOME | End: 2023-04-27
Payer: MEDICARE

## 2023-04-27 VITALS
DIASTOLIC BLOOD PRESSURE: 77 MMHG | HEIGHT: 66 IN | WEIGHT: 168 LBS | BODY MASS INDEX: 27 KG/M2 | HEART RATE: 73 BPM | SYSTOLIC BLOOD PRESSURE: 108 MMHG

## 2023-04-27 PROCEDURE — 99214 OFFICE O/P EST MOD 30 MIN: CPT | Performed by: SURGERY

## 2023-04-27 PROCEDURE — 29581 APPL MULTLAYER CMPRN SYS LEG: CPT

## 2023-04-27 NOTE — FLOWSHEET NOTE
04/27/23 0916   Right Leg Edema Point of Measurement   Leg circumference 37 cm   Ankle circumference 23 cm   Foot circumference 22 cm   Compression Therapy 2 layer compression wrap   Wound 04/20/23 Pretibial Right; Anterior; Lower #1   Date First Assessed/Time First Assessed: 04/20/23 1053   Present on Hospital Admission: Yes  Wound Approximate Age at First Assessment (Weeks): 6 weeks  Primary Wound Type: Venous Ulcer  Location: Pretibial  Wound Location Orientation: Right; Anterior;. .. Wound Image    Wound Etiology Venous   Dressing Status Old drainage noted   Wound Cleansed Cleansed with saline; Soap and water   Dressing/Treatment Hydrofera blue   Wound Length (cm) 8 cm   Wound Width (cm) 9 cm   Wound Depth (cm) 0.1 cm   Wound Surface Area (cm^2) 72 cm^2   Change in Wound Size % (l*w) 20   Wound Volume (cm^3) 7.2 cm^3   Wound Healing % 20   Wound Assessment Pink/red;Granulation tissue;Slough   Drainage Amount Moderate   Drainage Description Serosanguinous   Odor None   Melba-wound Assessment Hemosiderin staining (brown yellow)   Wound Thickness Description not for Pressure Injury Full thickness

## 2023-04-27 NOTE — DISCHARGE INSTRUCTIONS
Discharge Instructions for  Sangeetha Solorio  [address] [place] [address] [address], [address]  Phone [phone]   Fax [phone]      NAME:  Jaime Galdamez  YOB: 1940  MEDICAL RECORD NUMBER:  [id number]  DATE:  @ED@    Return Appointment:   2 weeks with Nettie Brittle, MD      Instructions: Right lower leg:  Hydrofera Blue: Cut to wound size, moisten with saline, and apply to wound bed, ABD, roll gauze. 2 layer compression with wound and lower extremity assessment. If there is any problem with the dressing (too tight, slides down, etc.) Patient to return to wound clinic to have re-wrapped by clinician. May buy a cast cover at [facility]. Elevate leg when sitting. Should you experience increased redness, swelling, pain, foul odor, size of wound(s), or have a temperature over 101 degrees please contact the [address] [place] [address] Road at [phone] or if after hours contact your primary care physician or go to the hospital emergency department. PLEASE NOTE: IF YOU ARE UNABLE TO OBTAIN WOUND SUPPLIES, CONTINUE TO USE THE SUPPLIES YOU HAVE AVAILABLE UNTIL YOU ARE ABLE TO REACH US. IT IS MOST IMPORTANT TO KEEP THE WOUND COVERED AT ALL TIMES.     Electronically signed Chava Langston RN on 4/27/2023 at 9:19 AM

## 2023-04-27 NOTE — PROGRESS NOTES
health care professional/appropriate source (not separately reported)   Moderate risk of morbidity from additional diagnostic testing or treatment  Examples only:  ?Prescription drug management - advanced wound care prescription Rx wound care products  (eg Iodosorb, hydrofera, silvadene, collagen, puracol plus, optiloc, biologics - placenta, Theraskin, Apligraf). Note also that if home health care is involved, they will not apply topical therapies without a prescription/order from physician      ? Decision regarding minor surgery with identified patient or procedure risk factors  ? Decision regarding elective major surgery without identified patient or procedure risk factors   ? Diagnosis or treatment significantly limited by social determinants of health       [address]  [address] High High  ? 1or more chronic illnesses with severe exacerbation, progression, or side effects of treatment;    or  ?1 acute or chronic illness or injury that poses a threat to life or bodily function   Extensive  (Must meet the requirements of at least 2 out of 3 categories)  Category 1: Tests, documents, or independent historian(s)  ? Any combination of 3 from the following:   ?Review of prior external note(s) from each unique source*;  ?Review of the result(s) of each unique test*;   ?Ordering of each unique test*;   ?Assessment requiring an independent historian(s)    or   Category 2: Independent interpretation of tests   ? Independent interpretation of a test performed by another physician/other qualified health care professional (not separately reported);     or  Category 3: Discussion of management or test interpretation  ? Discussion of management or test interpretation with external physician/other qualified health care professional/appropriate source (not separately reported)   High risk of morbidity from additional diagnostic testing or treatment  Examples only:  ?Drug therapy requiring intensive monitoring for toxicity  ? Decision regarding

## 2023-04-27 NOTE — WOUND CARE
Multilayer Compression Wrap   (Not Unna) Below the Knee    NAME:  Destiney Brown  YOB: 1940  MEDICAL RECORD NUMBER:  [id number]  DATE:  4/27/2023    Multilayer compression wrap: Removed old Multilayer wrap if indicated and wash leg with mild soap/water. Applied moisturizing agent to dry skin as needed. Applied primary and secondary dressing as ordered. Applied multilayered dressing below the knee to right lower leg. Instructed patient/caregiver not to remove dressing and to keep it clean and dry. Instructed patient/caregiver on complications to report to provider, such as pain, numbness in toes, heavy drainage, and slippage of dressing. Instructed patient on purpose of compression dressing and on activity and exercise recommendations.       Electronically signed by Andrew Stephenson RN on 4/27/2023 at 9:18 AM

## 2023-04-27 NOTE — WOUND CARE
Discharge Instructions for  Sangeetha Solorio  [address] [place] [address]  [address], [address]  Phone [phone]   Fax [phone]      NAME:  Zhou De La Cruz OF BIRTH:  1940  MEDICAL RECORD NUMBER:  [id number]  DATE:  4/27/2023    Return Appointment:   2 weeks with Lilliana Pleitez MD  Clinician visit Monday's and Thursday's until MD follow up    Instructions: Right lower leg:  Hydrofera Blue: Cut to wound size, moisten with saline, and apply to wound bed, ABD, roll gauze. 2 layer compression with wound and lower extremity assessment. If there is any problem with the dressing (too tight, slides down, etc.) Patient to return to wound clinic to have re-wrapped by clinician. May buy a cast cover at [facility]. Elevate leg when sitting. Should you experience increased redness, swelling, pain, foul odor, size of wound(s), or have a temperature over 101 degrees please contact the [address] [place] [address] Road at [phone] or if after hours contact your primary care physician or go to the hospital emergency department. PLEASE NOTE: IF YOU ARE UNABLE TO OBTAIN WOUND SUPPLIES, CONTINUE TO USE THE SUPPLIES YOU HAVE AVAILABLE UNTIL YOU ARE ABLE TO REACH US. IT IS MOST IMPORTANT TO KEEP THE WOUND COVERED AT ALL TIMES.     Electronically signed Daniela Reardon RN on 4/27/2023 at 9:18 AM

## 2023-05-01 ENCOUNTER — HOSPITAL ENCOUNTER (OUTPATIENT)
Dept: WOUND CARE | Age: 83
Discharge: HOME | End: 2023-05-01
Payer: MEDICARE

## 2023-05-01 VITALS
HEART RATE: 75 BPM | HEIGHT: 66 IN | BODY MASS INDEX: 27 KG/M2 | SYSTOLIC BLOOD PRESSURE: 126 MMHG | TEMPERATURE: 98.4 F | WEIGHT: 168 LBS | OXYGEN SATURATION: 96 % | DIASTOLIC BLOOD PRESSURE: 51 MMHG | RESPIRATION RATE: 18 BRPM

## 2023-05-01 PROCEDURE — 29581 APPL MULTLAYER CMPRN SYS LEG: CPT

## 2023-05-01 NOTE — FLOWSHEET NOTE
05/01/23 1610   Right Leg Edema Point of Measurement   Leg circumference 37 cm   Ankle circumference 23 cm   Foot circumference 21.5 cm   Compression Therapy 2 layer compression wrap   Wound 04/20/23 Pretibial Right; Anterior; Lower #1   Date First Assessed/Time First Assessed: 04/20/23 1053   Present on Hospital Admission: Yes  Wound Approximate Age at First Assessment (Weeks): 6 weeks  Primary Wound Type: Venous Ulcer  Location: Pretibial  Wound Location Orientation: Right; Anterior;. ..    Wound Image    Wound Etiology Venous   Dressing Status Old drainage noted   Wound Cleansed Cleansed with saline   Dressing/Treatment Hydrofera blue   Wound Length (cm) 7 cm   Wound Width (cm) 5 cm   Wound Depth (cm) 0.1 cm   Wound Surface Area (cm^2) 35 cm^2   Change in Wound Size % (l*w) 61.11   Wound Volume (cm^3) 3.5 cm^3   Wound Healing % 61   Wound Assessment Epithelialization;Granulation tissue   Drainage Amount Moderate   Drainage Description Serosanguinous   Odor None   Melba-wound Assessment Hemosiderin staining (brown yellow)   Wound Thickness Description not for Pressure Injury Full thickness     Patient is currently taking Eliquis

## 2023-05-01 NOTE — WOUND CARE
Multilayer Compression Wrap   (Not Unna) Below the Knee    NAME:  Destiney Brown  YOB: 1940  MEDICAL RECORD NUMBER:  [id number]  DATE:  5/1/2023    Multilayer compression wrap: Removed old Multilayer wrap if indicated and wash leg with mild soap/water. Applied primary and secondary dressing as ordered. Applied multilayered dressing below the knee to right lower leg. Instructed patient/caregiver not to remove dressing and to keep it clean and dry. Instructed patient/caregiver on complications to report to provider, such as pain, numbness in toes, heavy drainage, and slippage of dressing. Instructed patient on purpose of compression dressing and on activity and exercise recommendations.       Electronically signed by Chery Urrutia PT, [facility] on 5/1/2023 at 4:12 PM

## 2023-05-04 ENCOUNTER — HOSPITAL ENCOUNTER (OUTPATIENT)
Dept: WOUND CARE | Age: 83
Discharge: HOME | End: 2023-05-04
Payer: MEDICARE

## 2023-05-04 VITALS
BODY MASS INDEX: 27 KG/M2 | TEMPERATURE: 96.8 F | SYSTOLIC BLOOD PRESSURE: 120 MMHG | WEIGHT: 168 LBS | DIASTOLIC BLOOD PRESSURE: 78 MMHG | HEIGHT: 66 IN | RESPIRATION RATE: 18 BRPM | HEART RATE: 72 BPM

## 2023-05-04 PROCEDURE — 29581 APPL MULTLAYER CMPRN SYS LEG: CPT

## 2023-05-04 NOTE — FLOWSHEET NOTE
05/04/23 0936   Right Leg Edema Point of Measurement   Leg circumference 35 cm   Ankle circumference 24 cm   Foot circumference 22 cm   Compression Therapy 2 layer compression wrap   Wound 04/20/23 Pretibial Right; Anterior; Lower #1   Date First Assessed/Time First Assessed: 04/20/23 1053   Present on Hospital Admission: Yes  Wound Approximate Age at First Assessment (Weeks): 6 weeks  Primary Wound Type: Venous Ulcer  Location: Pretibial  Wound Location Orientation: Right; Anterior;. .. Wound Image    Wound Etiology Venous   Dressing Status Old drainage noted   Wound Cleansed Cleansed with saline   Dressing/Treatment Hydrofera blue   Wound Length (cm) 7 cm   Wound Width (cm) 4 cm   Wound Depth (cm) 0.1 cm   Wound Surface Area (cm^2) 28 cm^2   Change in Wound Size % (l*w) 68.89   Wound Volume (cm^3) 2.8 cm^3   Wound Healing % 69   Wound Assessment Granulation tissue;Slough   Drainage Amount Moderate   Drainage Description Serosanguinous   Odor None   Melba-wound Assessment Hemosiderin staining (brown yellow); Blisters   Wound Thickness Description not for Pressure Injury Full thickness   Pain Assessment   Pain Assessment None - Denies Pain

## 2023-05-08 ENCOUNTER — HOSPITAL ENCOUNTER (OUTPATIENT)
Dept: WOUND CARE | Age: 83
Discharge: HOME | End: 2023-05-08
Payer: MEDICARE

## 2023-05-08 VITALS
DIASTOLIC BLOOD PRESSURE: 67 MMHG | RESPIRATION RATE: 18 BRPM | OXYGEN SATURATION: 96 % | HEART RATE: 73 BPM | BODY MASS INDEX: 26.68 KG/M2 | SYSTOLIC BLOOD PRESSURE: 103 MMHG | HEIGHT: 66 IN | TEMPERATURE: 97.9 F | WEIGHT: 166 LBS

## 2023-05-08 PROCEDURE — 29581 APPL MULTLAYER CMPRN SYS LEG: CPT

## 2023-05-08 NOTE — WOUND CARE
Multilayer Compression Wrap   (Not Unna) Below the Knee    NAME:  Destiney Brown  YOB: 1940  MEDICAL RECORD NUMBER:  [id number]  DATE:  5/8/2023    Multilayer compression wrap: Removed old Multilayer wrap if indicated and wash leg with mild soap/water. Applied primary and secondary dressing as ordered. Applied multilayered dressing below the knee to right lower leg. Instructed patient/caregiver not to remove dressing and to keep it clean and dry. Instructed patient/caregiver on complications to report to provider, such as pain, numbness in toes, heavy drainage, and slippage of dressing. Instructed patient on purpose of compression dressing and on activity and exercise recommendations.       Electronically signed by Terrance Levy PT, [facility] on 5/8/2023 at 12:10 PM

## 2023-05-08 NOTE — FLOWSHEET NOTE
05/08/23 1003   Right Leg Edema Point of Measurement   Leg circumference 35 cm   Ankle circumference 24 cm   Foot circumference 22 cm   Compression Therapy 2 layer compression wrap   Wound 04/20/23 Pretibial Right; Anterior; Lower #1   Date First Assessed/Time First Assessed: 04/20/23 1053   Present on Hospital Admission: Yes  Wound Approximate Age at First Assessment (Weeks): 6 weeks  Primary Wound Type: Venous Ulcer  Location: Pretibial  Wound Location Orientation: Right; Anterior;. ..    Wound Image    Wound Etiology Venous   Dressing Status Old drainage noted   Wound Cleansed Cleansed with saline   Dressing/Treatment Hydrofera blue   Wound Length (cm) 5.7 cm   Wound Width (cm) 4.2 cm   Wound Depth (cm) 0.1 cm   Wound Surface Area (cm^2) 23.94 cm^2   Change in Wound Size % (l*w) 73.4   Wound Volume (cm^3) 2.394 cm^3   Wound Healing % 73   Wound Assessment Granulation tissue;Slough   Drainage Amount Moderate   Drainage Description Serosanguinous   Odor None   Melba-wound Assessment Hemosiderin staining (brown yellow)   Wound Thickness Description not for Pressure Injury Full thickness   Pain Assessment   Pain Assessment None - Denies Pain     Patient is currently taking Eliquis

## 2023-05-10 DIAGNOSIS — Z95.0 PACEMAKER: ICD-10-CM

## 2023-05-10 DIAGNOSIS — I49.5 SICK SINUS SYNDROME (HCC): ICD-10-CM

## 2023-05-11 ENCOUNTER — HOSPITAL ENCOUNTER (OUTPATIENT)
Dept: WOUND CARE | Age: 83
Discharge: HOME | End: 2023-05-11
Payer: MEDICARE

## 2023-05-11 VITALS
SYSTOLIC BLOOD PRESSURE: 119 MMHG | TEMPERATURE: 97.8 F | WEIGHT: 167 LBS | OXYGEN SATURATION: 97 % | HEIGHT: 66 IN | RESPIRATION RATE: 18 BRPM | HEART RATE: 73 BPM | BODY MASS INDEX: 26.84 KG/M2 | DIASTOLIC BLOOD PRESSURE: 88 MMHG

## 2023-05-11 PROCEDURE — 29581 APPL MULTLAYER CMPRN SYS LEG: CPT

## 2023-05-11 PROCEDURE — 99214 OFFICE O/P EST MOD 30 MIN: CPT | Performed by: SURGERY

## 2023-05-11 NOTE — PROGRESS NOTES
[address]  Consult Note/H&P/Progress Note      Crista Lopez  MEDICAL RECORD NUMBER:  [id number]  AGE: 79 y.o. RACE White (non-)  GENDER: female  : 1940  EPISODE DATE:  2023       Subjective:      CC (Chief Complaint) and HISTORY of PRESENT ILLNESS (HPI):    Crista Lopez is a 79 y.o. female who presents today for wound/ulcer evaluation. She reported a traumatic injury of her right lower extremity when a coat-tree fell on her leg in the garage on 3/13/23    She was on Eliquis at the time and had bleeding with had to be seen in the emergency room. Her hemoglobin dropped 2 points but she did not require transfusion. She had local wound care and subsequently developed infection treated with doxycycline and later clindamycin. She came to the wound center for first visit on 23. She denies history of diabetes or tobacco use. She had easily palpable pedal pulses bilaterally. 3/19/23 Xray right tib/fib  Hx: Laceration and anterior aspect in the lower right leg     FINDINGS: Osteoarthritis is present in the knee and ankle. There is no displaced fracture or dislocation. Soft tissues are normal.     Impression:  No displaced fracture. PAST MEDICAL HISTORY  Past Medical History:   Diagnosis Date    Acute renal failure (ARF) (HCC)     Arrhythmia     Arthritis          Chronic pain     arthritis in knees    Coronary artery disease of native artery of native heart with stable angina pectoris ([facility] [address] 2013    prior thrombotic occlusion of the LAD. No obstructive disease at cath  2002 Aug 2013: Cath no obstructive CAD 2016 - EF 60-65%.  pseudonormal diastolic function, RVSP  - Lexiscan MPI - normal EKG, perfusion and LVEF Oct 2021- Echo - EF 50-55%, mild to mod MR, bi atrial enlargement, mild  RVE, RVSP 44 Aug 2022 - normal perfusion, EF 66%     Dyslipidemia     GERD

## 2023-05-11 NOTE — DISCHARGE INSTRUCTIONS
Return Appointment:   1 week with Maddy Cordoba MD        Instructions: Right anterior lower leg:  Hydrofera Ready: Cut to wound size, place in wound bed, shiny side out. Cover with ABD  2 layer compression with wound and lower extremity assessment. If there is any problem with the dressing (too tight, slides down, etc.) Patient to return to wound clinic to have re-wrapped by clinician. Change dressing in 1 week     Use cast cover while showering to prevent dressing from getting wet. Elevate leg when sitting.

## 2023-05-11 NOTE — WOUND CARE
Multilayer Compression Wrap   (Not Unna) Below the Knee    NAME:  Destiney Brown  YOB: 1940  MEDICAL RECORD NUMBER:  [id number]  DATE:  5/11/2023    Multilayer compression wrap: Removed old Multilayer wrap if indicated and wash leg with mild soap/water. Applied primary and secondary dressing as ordered. Applied multilayered dressing below the knee to right lower leg. Instructed patient/caregiver not to remove dressing and to keep it clean and dry. Instructed patient/caregiver on complications to report to provider, such as pain, numbness in toes, heavy drainage, and slippage of dressing. Instructed patient on purpose of compression dressing and on activity and exercise recommendations.       Electronically signed by Terrance Levy PT, [address] [facility] [facility] on 5/11/2023 at 5:04 PM

## 2023-05-11 NOTE — FLOWSHEET NOTE
05/11/23 1048   Right Leg Edema Point of Measurement   Leg circumference 36.5 cm   Ankle circumference 22 cm   Foot circumference 22 cm   Compression Therapy 2 layer compression wrap   Wound 04/20/23 Pretibial Right; Anterior; Lower #1   Date First Assessed/Time First Assessed: 04/20/23 1053   Present on Hospital Admission: Yes  Wound Approximate Age at First Assessment (Weeks): 6 weeks  Primary Wound Type: Venous Ulcer  Location: Pretibial  Wound Location Orientation: Right; Anterior;. ..    Wound Image    Wound Etiology Venous   Dressing Status Old drainage noted   Wound Cleansed Soap and water   Dressing/Treatment Hydrofera blue   Wound Length (cm) 3.5 cm   Wound Width (cm) 4 cm   Wound Depth (cm) 0.1 cm   Wound Surface Area (cm^2) 14 cm^2   Change in Wound Size % (l*w) 84.44   Wound Volume (cm^3) 1.4 cm^3   Wound Healing % 84   Wound Assessment Granulation tissue;Slough   Drainage Amount Small   Drainage Description Serosanguinous   Odor None   Melba-wound Assessment Hemosiderin staining (brown yellow)   Wound Thickness Description not for Pressure Injury Full thickness   Pain Assessment   Pain Assessment None - Denies Pain     Patient is currently taking Eliquis

## 2023-05-11 NOTE — WOUND CARE
Discharge Instructions for  Sangeetha Solorio  [address] [facility] [address]  [address] [facility], [address]  Phone [phone]   Fax [phone]      NAME:  Chilango Moreno OF BIRTH:  1940  MEDICAL RECORD NUMBER:  [id number]  DATE:  5/11/2023    Return Appointment:   1 week with Micaela Lee MD      Instructions: Right anterior lower leg:  Hydrofera Ready: Cut to wound size, place in wound bed, shiny side out. Cover with ABD  2 layer compression with wound and lower extremity assessment. If there is any problem with the dressing (too tight, slides down, etc.) Patient to return to wound clinic to have re-wrapped by clinician. Change dressing in 1 week    Use cast cover while showering to prevent dressing from getting wet. Elevate leg when sitting. Should you experience increased redness, swelling, pain, foul odor, size of wound(s), or have a temperature over 101 degrees please contact the [address] [place] [address] Road at [phone] or if after hours contact your primary care physician or go to the hospital emergency department. PLEASE NOTE: IF YOU ARE UNABLE TO OBTAIN WOUND SUPPLIES, CONTINUE TO USE THE SUPPLIES YOU HAVE AVAILABLE UNTIL YOU ARE ABLE TO REACH US. IT IS MOST IMPORTANT TO KEEP THE WOUND COVERED AT ALL TIMES.     Electronically signed Jose Carlos Ray PT, [address] [facility],[address] on 5/11/2023 at 11:14 AM

## 2023-05-18 ENCOUNTER — HOSPITAL ENCOUNTER (OUTPATIENT)
Dept: WOUND CARE | Age: 83
Discharge: HOME | End: 2023-05-18
Payer: MEDICARE

## 2023-05-18 VITALS
TEMPERATURE: 98.3 F | RESPIRATION RATE: 18 BRPM | SYSTOLIC BLOOD PRESSURE: 125 MMHG | OXYGEN SATURATION: 97 % | DIASTOLIC BLOOD PRESSURE: 69 MMHG | HEART RATE: 71 BPM

## 2023-05-18 DIAGNOSIS — I89.0 LYMPHEDEMA: ICD-10-CM

## 2023-05-18 DIAGNOSIS — L30.9 DERMATITIS: ICD-10-CM

## 2023-05-18 DIAGNOSIS — I87.331 CHRONIC VENOUS HTN W ULCER AND INFLAMMATION OF R LOW EXTREM (HCC): ICD-10-CM

## 2023-05-18 DIAGNOSIS — S81.801D TRAUMATIC OPEN WOUND OF LOWER LEG, RIGHT, SUBSEQUENT ENCOUNTER: Primary | ICD-10-CM

## 2023-05-18 PROBLEM — L92.9 HYPERGRANULATION: Status: ACTIVE | Noted: 2023-05-18

## 2023-05-18 PROCEDURE — 99214 OFFICE O/P EST MOD 30 MIN: CPT | Performed by: SURGERY

## 2023-05-18 PROCEDURE — 17250 CHEM CAUT OF GRANLTJ TISSUE: CPT

## 2023-05-18 PROCEDURE — 17250 CHEM CAUT OF GRANLTJ TISSUE: CPT | Performed by: SURGERY

## 2023-05-18 RX ORDER — LIDOCAINE HYDROCHLORIDE 20 MG/ML
JELLY TOPICAL ONCE
OUTPATIENT
Start: 2023-05-18 | End: 2023-05-18

## 2023-05-18 RX ORDER — SODIUM CHLOR/HYPOCHLOROUS ACID 0.033 %
SOLUTION, IRRIGATION IRRIGATION ONCE
OUTPATIENT
Start: 2023-05-18 | End: 2023-05-18

## 2023-05-18 RX ORDER — GINSENG 100 MG
CAPSULE ORAL ONCE
OUTPATIENT
Start: 2023-05-18 | End: 2023-05-18

## 2023-05-18 RX ORDER — CLOBETASOL PROPIONATE 0.5 MG/G
OINTMENT TOPICAL ONCE
OUTPATIENT
Start: 2023-05-18 | End: 2023-05-18

## 2023-05-18 RX ORDER — BACITRACIN ZINC AND POLYMYXIN B SULFATE 500; 1000 [USP'U]/G; [USP'U]/G
OINTMENT TOPICAL ONCE
OUTPATIENT
Start: 2023-05-18 | End: 2023-05-18

## 2023-05-18 RX ORDER — GENTAMICIN SULFATE 1 MG/G
OINTMENT TOPICAL ONCE
OUTPATIENT
Start: 2023-05-18 | End: 2023-05-18

## 2023-05-18 RX ORDER — LIDOCAINE 50 MG/G
OINTMENT TOPICAL ONCE
OUTPATIENT
Start: 2023-05-18 | End: 2023-05-18

## 2023-05-18 RX ORDER — BACITRACIN, NEOMYCIN, POLYMYXIN B 400; 3.5; 5 [USP'U]/G; MG/G; [USP'U]/G
OINTMENT TOPICAL ONCE
OUTPATIENT
Start: 2023-05-18 | End: 2023-05-18

## 2023-05-18 RX ORDER — BETAMETHASONE DIPROPIONATE 0.05 %
OINTMENT (GRAM) TOPICAL ONCE
OUTPATIENT
Start: 2023-05-18 | End: 2023-05-18

## 2023-05-18 RX ORDER — LIDOCAINE HYDROCHLORIDE 40 MG/ML
SOLUTION TOPICAL ONCE
OUTPATIENT
Start: 2023-05-18 | End: 2023-05-18

## 2023-05-18 RX ORDER — LIDOCAINE 40 MG/G
CREAM TOPICAL ONCE
OUTPATIENT
Start: 2023-05-18 | End: 2023-05-18

## 2023-05-18 NOTE — PROCEDURES
Chemical Cautery    Hypergranulation tissue of the wound was chemically cauterized with silver nitrate to help facilitate wound healing    CPT 71951    Patient tolerated this well. Chemical Cautery  Performed by: Janeth Moyer MD  Consent obtained: yes  Time out taken: yes  Tissue Type: hypergranulation  Pain Control: yes,, no pain issues  Method: chemical cauterization with AgNo3    Location of Chemical Cautery:   Wound/Ulcer #1    Procedural Pain: 0/10   Post Procedural Pain: 0/10   Response to treatment: Pt tolerated treatment well.

## 2023-05-18 NOTE — FLOWSHEET NOTE
05/18/23 0947   Right Leg Edema Point of Measurement   Leg circumference 36.5 cm   Ankle circumference 22 cm   Foot circumference 21 cm   Compression Therapy 2 layer compression wrap   Wound 04/20/23 Pretibial Right; Anterior; Lower #1   Date First Assessed/Time First Assessed: 04/20/23 1053   Present on Hospital Admission: Yes  Wound Approximate Age at First Assessment (Weeks): 6 weeks  Primary Wound Type: Venous Ulcer  Location: Pretibial  Wound Location Orientation: Right; Anterior;. ..    Wound Image    Wound Etiology Venous   Dressing Status Old drainage noted   Wound Cleansed Soap and water   Dressing/Treatment Hydrofera blue   Wound Length (cm) 4.3 cm   Wound Width (cm) 1.5 cm   Wound Depth (cm) 0.1 cm   Wound Surface Area (cm^2) 6.45 cm^2   Change in Wound Size % (l*w) 92.83   Wound Volume (cm^3) 0.645 cm^3   Wound Healing % 93   Wound Assessment Granulation tissue;Slough   Drainage Amount Moderate   Drainage Description Serosanguinous   Odor None   Melba-wound Assessment Hemosiderin staining (brown yellow)   Wound Thickness Description not for Pressure Injury Full thickness   Pain Assessment   Pain Assessment None - Denies Pain     Patient is currently taking Eliquis

## 2023-05-18 NOTE — WOUND CARE
Discharge Instructions for  Sangeetha Solorio  [address] [place] [address]  [address], [address]   Phone [phone]   Fax [phone]      NAME:  Alba Cowden OF BIRTH:  1940  MEDICAL RECORD NUMBER:  [id number]  DATE:  5/18/2023    Return Appointment:   1 week with Logan Souza MD      Instructions: Right anterior lower leg:  Hydrofera Ready: Cut to wound size, place in wound bed, shiny side out. Cover with ABD  2 layer compression with wound and lower extremity assessment. If there is any problem with the dressing (too tight, slides down, etc.) Patient to return to wound clinic to have re-wrapped by clinician. Change dressing in 1 week    Use cast cover while showering to prevent dressing from getting wet. Elevate leg when sitting. Silver nitrate applied today by  for chemical cautery          Should you experience increased redness, swelling, pain, foul odor, size of wound(s), or have a temperature over 101 degrees please contact the [address] [place] [address] Road at [phone] or if after hours contact your primary care physician or go to the hospital emergency department. PLEASE NOTE: IF YOU ARE UNABLE TO OBTAIN WOUND SUPPLIES, CONTINUE TO USE THE SUPPLIES YOU HAVE AVAILABLE UNTIL YOU ARE ABLE TO REACH US. IT IS MOST IMPORTANT TO KEEP THE WOUND COVERED AT ALL TIMES.     Electronically signed Mery Campos PT, [address] [facility],[address] on 5/18/2023 at 10:27 AM

## 2023-05-18 NOTE — WOUND CARE
Multilayer Compression Wrap   (Not Unna) Below the Knee    NAME:  Destiney Brown  YOB: 1940  MEDICAL RECORD NUMBER:  [id number]  DATE:  5/18/2023    Multilayer compression wrap: Removed old Multilayer wrap if indicated and wash leg with mild soap/water. Applied primary and secondary dressing as ordered. Applied multilayered dressing below the knee to right lower leg. Instructed patient/caregiver not to remove dressing and to keep it clean and dry. Instructed patient/caregiver on complications to report to provider, such as pain, numbness in toes, heavy drainage, and slippage of dressing. Instructed patient on purpose of compression dressing and on activity and exercise recommendations.       Electronically signed by Rudy Cabral PT, [facility] on 5/18/2023 at 10:30 AM

## 2023-05-18 NOTE — DISCHARGE INSTRUCTIONS
Return Appointment:   1 week with Maxime Ayala MD        Instructions: Right anterior lower leg:  Hydrofera Ready: Cut to wound size, place in wound bed, shiny side out. Cover with ABD  2 layer compression with wound and lower extremity assessment. If there is any problem with the dressing (too tight, slides down, etc.) Patient to return to wound clinic to have re-wrapped by clinician. Change dressing in 1 week     Use cast cover while showering to prevent dressing from getting wet. Elevate leg when sitting.   Silver nitrate applied today by  for chemical cautery

## 2023-05-18 NOTE — PROGRESS NOTES
[address]  Consult Note/H&P/Progress Note      Elham Martinez  MEDICAL RECORD NUMBER:  [id number]  AGE: 79 y.o. RACE White (non-)  GENDER: female  : 1940  EPISODE DATE:  2023       Subjective:      CC (Chief Complaint) and HISTORY of PRESENT ILLNESS (HPI):    Elham Martinez is a 79 y.o. female who presents today for wound/ulcer evaluation. She reported a traumatic injury of her right lower extremity when a coat-tree fell on her leg in the garage on 3/13/23    She was on Eliquis at the time and had bleeding with had to be seen in the emergency room. Her hemoglobin dropped 2 points but she did not require transfusion. She had local wound care and subsequently developed infection treated with doxycycline and later clindamycin. She came to the wound center for first visit on 23. She denies history of diabetes or tobacco use. She had easily palpable pedal pulses bilaterally. 3/19/23 Xray right tib/fib  Hx: Laceration and anterior aspect in the lower right leg     FINDINGS: Osteoarthritis is present in the knee and ankle. There is no displaced fracture or dislocation. Soft tissues are normal.     Impression:  No displaced fracture. PAST MEDICAL HISTORY  Past Medical History:   Diagnosis Date    Acute renal failure (ARF) (HCC)     Arrhythmia     Arthritis          Chronic pain     arthritis in knees    Coronary artery disease of native artery of native heart with stable angina pectoris ([facility] [address] 2013    prior thrombotic occlusion of the LAD. No obstructive disease at cath  2002 Aug 2013: Cath no obstructive CAD 2016 - EF 60-65%.  pseudonormal diastolic function, RVSP  - Lexiscan MPI - normal EKG, perfusion and LVEF Oct 2021- Echo - EF 50-55%, mild to mod MR, bi atrial enlargement, mild  RVE, RVSP 44 Aug 2022 - normal perfusion, EF 66%     Dyslipidemia     GERD

## 2023-05-25 ENCOUNTER — HOSPITAL ENCOUNTER (OUTPATIENT)
Dept: WOUND CARE | Age: 83
Discharge: HOME | End: 2023-05-25
Payer: MEDICARE

## 2023-05-25 VITALS
BODY MASS INDEX: 26.74 KG/M2 | SYSTOLIC BLOOD PRESSURE: 124 MMHG | TEMPERATURE: 98.1 F | HEIGHT: 66 IN | HEART RATE: 71 BPM | DIASTOLIC BLOOD PRESSURE: 81 MMHG | WEIGHT: 166.4 LBS

## 2023-05-25 DIAGNOSIS — I87.331 CHRONIC VENOUS HTN W ULCER AND INFLAMMATION OF R LOW EXTREM (HCC): ICD-10-CM

## 2023-05-25 DIAGNOSIS — I89.0 LYMPHEDEMA: ICD-10-CM

## 2023-05-25 DIAGNOSIS — L30.9 DERMATITIS: ICD-10-CM

## 2023-05-25 DIAGNOSIS — S81.801D TRAUMATIC OPEN WOUND OF LOWER LEG, RIGHT, SUBSEQUENT ENCOUNTER: Primary | ICD-10-CM

## 2023-05-25 PROCEDURE — 29581 APPL MULTLAYER CMPRN SYS LEG: CPT

## 2023-05-25 PROCEDURE — 99214 OFFICE O/P EST MOD 30 MIN: CPT | Performed by: SURGERY

## 2023-05-25 RX ORDER — BACITRACIN, NEOMYCIN, POLYMYXIN B 400; 3.5; 5 [USP'U]/G; MG/G; [USP'U]/G
OINTMENT TOPICAL ONCE
OUTPATIENT
Start: 2023-05-25 | End: 2023-05-25

## 2023-05-25 RX ORDER — LIDOCAINE HYDROCHLORIDE 40 MG/ML
SOLUTION TOPICAL ONCE
OUTPATIENT
Start: 2023-05-25 | End: 2023-05-25

## 2023-05-25 RX ORDER — LIDOCAINE HYDROCHLORIDE 20 MG/ML
JELLY TOPICAL ONCE
OUTPATIENT
Start: 2023-05-25 | End: 2023-05-25

## 2023-05-25 RX ORDER — LIDOCAINE 40 MG/G
CREAM TOPICAL ONCE
OUTPATIENT
Start: 2023-05-25 | End: 2023-05-25

## 2023-05-25 RX ORDER — GENTAMICIN SULFATE 1 MG/G
OINTMENT TOPICAL ONCE
OUTPATIENT
Start: 2023-05-25 | End: 2023-05-25

## 2023-05-25 RX ORDER — LIDOCAINE 50 MG/G
OINTMENT TOPICAL ONCE
OUTPATIENT
Start: 2023-05-25 | End: 2023-05-25

## 2023-05-25 RX ORDER — BETAMETHASONE DIPROPIONATE 0.05 %
OINTMENT (GRAM) TOPICAL ONCE
OUTPATIENT
Start: 2023-05-25 | End: 2023-05-25

## 2023-05-25 RX ORDER — BACITRACIN ZINC AND POLYMYXIN B SULFATE 500; 1000 [USP'U]/G; [USP'U]/G
OINTMENT TOPICAL ONCE
OUTPATIENT
Start: 2023-05-25 | End: 2023-05-25

## 2023-05-25 RX ORDER — GINSENG 100 MG
CAPSULE ORAL ONCE
OUTPATIENT
Start: 2023-05-25 | End: 2023-05-25

## 2023-05-25 RX ORDER — CLOBETASOL PROPIONATE 0.5 MG/G
OINTMENT TOPICAL ONCE
OUTPATIENT
Start: 2023-05-25 | End: 2023-05-25

## 2023-05-25 RX ORDER — SODIUM CHLOR/HYPOCHLOROUS ACID 0.033 %
SOLUTION, IRRIGATION IRRIGATION ONCE
OUTPATIENT
Start: 2023-05-25 | End: 2023-05-25

## 2023-05-25 NOTE — WOUND CARE
Discharge Instructions for  Sangeetha Solorio  [address] [place] [address] Maci Beth, [address] [facility] [address]  Phone [phone]   Fax [phone]      NAME:  Luly Valencia  YOB: 1940  MEDICAL RECORD NUMBER:  [id number]  DATE:  5/25/2023    Return Appointment:   1 week with Dr.Michael Ledesma      Instructions:   Right anterior lower leg:  Hydrofera Ready: Cut to wound size, place in wound bed, shiny side out. Cover with ABD  2 layer compression with wound and lower extremity assessment. If there is any problem with the dressing (too tight, slides down, etc.) Patient to return to wound clinic to have re-wrapped by clinician. Change dressing in 1 week     Continue to use cast cover while showering to prevent dressing from getting wet. Elevate leg when sitting. Should you experience increased redness, swelling, pain, foul odor, size of wound(s), or have a temperature over 101 degrees please contact the [address] [place] [address] Road at [phone] or if after hours contact your primary care physician or go to the hospital emergency department. PLEASE NOTE: IF YOU ARE UNABLE TO OBTAIN WOUND SUPPLIES, CONTINUE TO USE THE SUPPLIES YOU HAVE AVAILABLE UNTIL YOU ARE ABLE TO REACH US. IT IS MOST IMPORTANT TO KEEP THE WOUND COVERED AT ALL TIMES. Electronically signed Sandhya Perez.  Grayson Jennings RN on 5/25/2023 at 10:06 AM

## 2023-05-25 NOTE — FLOWSHEET NOTE
05/25/23 1000   Right Leg Edema Point of Measurement   Leg circumference 37 cm   Ankle circumference 21 cm   Foot circumference 21 cm   Compression Therapy 2 layer compression wrap   Left Leg Edema Point of Measurement   Leg circumference 37.5 cm   Ankle circumference 23 cm   Foot circumference 22 cm   Compression Therapy Compression stockings   Wound 04/20/23 Pretibial Right; Anterior; Lower #1   Date First Assessed/Time First Assessed: 04/20/23 1053   Present on Hospital Admission: Yes  Wound Approximate Age at First Assessment (Weeks): 6 weeks  Primary Wound Type: Venous Ulcer  Location: Pretibial  Wound Location Orientation: Right; Anterior;. .. Wound Image    Wound Etiology Venous   Dressing Status Old drainage noted   Wound Cleansed Cleansed with saline; Soap and water   Dressing/Treatment Hydrofera blue   Wound Length (cm) 4.3 cm   Wound Width (cm) 1.1 cm   Wound Depth (cm) 0.1 cm   Wound Surface Area (cm^2) 4.73 cm^2   Change in Wound Size % (l*w) 94.74   Wound Volume (cm^3) 0.473 cm^3   Wound Healing % 95   Wound Assessment Granulation tissue   Drainage Amount Moderate   Drainage Description Serosanguinous   Odor None   Melba-wound Assessment Hemosiderin staining (brown yellow)   Margins Attached edges   Wound Thickness Description not for Pressure Injury Full thickness   Pain Assessment   Pain Assessment None - Denies Pain     Patient is currently on Eliquis.

## 2023-05-25 NOTE — WOUND CARE
Multilayer Compression Wrap   (Not Unna) Below the Knee    NAME:  Destiney Brown  YOB: 1940  MEDICAL RECORD NUMBER:  [id number]  DATE:  5/25/2023    Multilayer compression wrap: Removed old Multilayer wrap if indicated and wash leg with mild soap/water. Applied moisturizing agent to dry skin as needed. Applied primary and secondary dressing as ordered. Applied multilayered dressing below the knee to right lower leg. Instructed patient/caregiver not to remove dressing and to keep it clean and dry. Instructed patient/caregiver on complications to report to provider, such as pain, numbness in toes, heavy drainage, and slippage of dressing. Instructed patient on purpose of compression dressing and on activity and exercise recommendations. Electronically signed by Eric Quezada.  Vinnie Mcginnis RN on 5/25/2023 at 10:14 AM

## 2023-05-25 NOTE — DISCHARGE INSTRUCTIONS
Right anterior lower leg:  Hydrofera Ready: Cut to wound size, place in wound bed, shiny side out. Cover with ABD  2 layer compression with wound and lower extremity assessment. If there is any problem with the dressing (too tight, slides down, etc.) Patient to return to wound clinic to have re-wrapped by clinician. Change dressing in 1 week     Continue to use cast cover while showering to prevent dressing from getting wet. Elevate leg when sitting.

## 2023-06-01 ENCOUNTER — HOSPITAL ENCOUNTER (OUTPATIENT)
Dept: WOUND CARE | Age: 83
Discharge: HOME | End: 2023-06-01
Payer: MEDICARE

## 2023-06-01 VITALS
RESPIRATION RATE: 18 BRPM | WEIGHT: 165 LBS | TEMPERATURE: 97.9 F | HEART RATE: 71 BPM | SYSTOLIC BLOOD PRESSURE: 116 MMHG | BODY MASS INDEX: 26.52 KG/M2 | DIASTOLIC BLOOD PRESSURE: 75 MMHG | HEIGHT: 66 IN

## 2023-06-01 PROCEDURE — 29581 APPL MULTLAYER CMPRN SYS LEG: CPT

## 2023-06-01 PROCEDURE — 99214 OFFICE O/P EST MOD 30 MIN: CPT | Performed by: SURGERY

## 2023-06-01 NOTE — FLOWSHEET NOTE
06/01/23 0911   Right Leg Edema Point of Measurement   Leg circumference 36 cm   Ankle circumference 21 cm   Foot circumference 21 cm   Compression Therapy 2 layer compression wrap   Wound 04/20/23 Pretibial Right; Anterior; Lower #1   Date First Assessed/Time First Assessed: 04/20/23 1053   Present on Hospital Admission: Yes  Wound Approximate Age at First Assessment (Weeks): 6 weeks  Primary Wound Type: Venous Ulcer  Location: Pretibial  Wound Location Orientation: Right; Anterior;. ..    Wound Image    Wound Etiology Venous   Dressing Status Intact   Wound Cleansed Cleansed with saline   Dressing/Treatment Hydrofera blue   Wound Length (cm) 1.3 cm   Wound Width (cm) 1.8 cm   Wound Depth (cm) 0.1 cm   Wound Surface Area (cm^2) 2.34 cm^2   Change in Wound Size % (l*w) 97.4   Wound Volume (cm^3) 0.234 cm^3   Wound Healing % 97   Wound Assessment Epithelialization   Drainage Amount Small   Drainage Description Serosanguinous   Odor None   Melba-wound Assessment Hemosiderin staining (brown yellow)   Wound Thickness Description not for Pressure Injury Full thickness   Pain Assessment   Pain Assessment None - Denies Pain     PATIENT IS TAKING ELIQUIS

## 2023-06-01 NOTE — WOUND CARE
Multilayer Compression Wrap   (Not Unna) Below the Knee    NAME:  Destiney ADAMS Solddiomedes  YOB: 1940  MEDICAL RECORD NUMBER:  [id number]  DATE:  6/1/2023    Multilayer compression wrap: Removed old Multilayer wrap if indicated and wash leg with mild soap/water. Applied moisturizing agent to dry skin as needed. Applied primary and secondary dressing as ordered. Applied multilayered dressing below the knee to right lower leg. Instructed patient/caregiver not to remove dressing and to keep it clean and dry. Instructed patient/caregiver on complications to report to provider, such as pain, numbness in toes, heavy drainage, and slippage of dressing. Instructed patient on purpose of compression dressing and on activity and exercise recommendations.       Electronically signed by Derrick Jain RN on 6/1/2023 at 9:25 AM

## 2023-06-01 NOTE — WOUND CARE
Discharge Instructions for  Sangeetha Solorio  [address] [place] [address]  [address], [address]  Phone [phone]   Fax [phone]      NAME:  Jaylene Pinon OF BIRTH:  1940  MEDICAL RECORD NUMBER:  [id number]  DATE:  6/1/2023    Return Appointment:   2 weeks with Amalia Rich  Return Appointment: 1 week with Clinician        Instructions:   Right anterior lower leg:  Clean wound with saline. Apply hydrogel to wound. Cover with Hydrofera Ready: Cut to wound size, place in wound bed, shiny side out. Cover with ABD  2 layer compression with wound and lower extremity assessment. If there is any problem with the dressing (too tight, slides down, etc.) Patient to return to wound clinic to have re-wrapped by clinician. Change dressing in 1 week     Continue to use cast cover while showering to prevent dressing from getting wet. Elevate leg when sitting. Should you experience increased redness, swelling, pain, foul odor, size of wound(s), or have a temperature over 101 degrees please contact the [address] [place] [address] Road at [phone] or if after hours contact your primary care physician or go to the hospital emergency department. PLEASE NOTE: IF YOU ARE UNABLE TO OBTAIN WOUND SUPPLIES, CONTINUE TO USE THE SUPPLIES YOU HAVE AVAILABLE UNTIL YOU ARE ABLE TO REACH US. IT IS MOST IMPORTANT TO KEEP THE WOUND COVERED AT ALL TIMES.     Electronically signed Kavita Atkinson RN on 6/1/2023 at 9:24 AM

## 2023-06-01 NOTE — PROGRESS NOTES
[address]  Consult Note/H&P/Progress Note      Dina Kwon  MEDICAL RECORD NUMBER:  [id number]  AGE: 79 y.o. RACE White (non-)  GENDER: female  : 1940  EPISODE DATE:  2023       Subjective:      CC (Chief Complaint) and HISTORY of PRESENT ILLNESS (HPI):    Dina Kwon is a 79 y.o. female who presents today for wound/ulcer evaluation. She reported a traumatic injury of her right lower extremity when a coat-tree fell on her leg in the garage on 3/13/23    She was on Eliquis at the time and had bleeding with had to be seen in the emergency room. Her hemoglobin dropped 2 points but she did not require transfusion. She had local wound care and subsequently developed infection treated with doxycycline and later clindamycin. She came to the wound center for first visit on 23. She denies history of diabetes or tobacco use. She had easily palpable pedal pulses bilaterally. 3/19/23 Xray right tib/fib  Hx: Laceration and anterior aspect in the lower right leg     FINDINGS: Osteoarthritis is present in the knee and ankle. There is no displaced fracture or dislocation. Soft tissues are normal.     Impression:  No displaced fracture. PAST MEDICAL HISTORY  Past Medical History:   Diagnosis Date    Acute renal failure (ARF) (HCC)     Arrhythmia     Arthritis          Chronic pain     arthritis in knees    Coronary artery disease of native artery of native heart with stable angina pectoris ([facility] [address] 2013    prior thrombotic occlusion of the LAD. No obstructive disease at cath  2002 Aug 2013: Cath no obstructive CAD 2016 - EF 60-65%.  pseudonormal diastolic function, RVSP  - Lexiscan MPI - normal EKG, perfusion and LVEF Oct 2021- Echo - EF 50-55%, mild to mod MR, bi atrial enlargement, mild  RVE, RVSP 44 Aug 2022 - normal perfusion, EF 66%     Dyslipidemia     GERD

## 2023-06-08 ENCOUNTER — HOSPITAL ENCOUNTER (OUTPATIENT)
Dept: WOUND CARE | Age: 83
Discharge: HOME | End: 2023-06-08
Payer: MEDICARE

## 2023-06-08 VITALS
OXYGEN SATURATION: 96 % | TEMPERATURE: 98.1 F | WEIGHT: 166 LBS | BODY MASS INDEX: 26.68 KG/M2 | DIASTOLIC BLOOD PRESSURE: 65 MMHG | RESPIRATION RATE: 18 BRPM | HEIGHT: 66 IN | HEART RATE: 71 BPM | SYSTOLIC BLOOD PRESSURE: 119 MMHG

## 2023-06-08 DIAGNOSIS — S81.801D TRAUMATIC OPEN WOUND OF LOWER LEG, RIGHT, SUBSEQUENT ENCOUNTER: Primary | ICD-10-CM

## 2023-06-08 DIAGNOSIS — I89.0 LYMPHEDEMA: ICD-10-CM

## 2023-06-08 DIAGNOSIS — I87.331 CHRONIC VENOUS HTN W ULCER AND INFLAMMATION OF R LOW EXTREM (HCC): ICD-10-CM

## 2023-06-08 DIAGNOSIS — L30.9 DERMATITIS: ICD-10-CM

## 2023-06-08 PROCEDURE — 29581 APPL MULTLAYER CMPRN SYS LEG: CPT

## 2023-06-08 RX ORDER — LIDOCAINE 50 MG/G
OINTMENT TOPICAL ONCE
OUTPATIENT
Start: 2023-06-08 | End: 2023-06-08

## 2023-06-08 RX ORDER — GENTAMICIN SULFATE 1 MG/G
OINTMENT TOPICAL ONCE
OUTPATIENT
Start: 2023-06-08 | End: 2023-06-08

## 2023-06-08 RX ORDER — LIDOCAINE 40 MG/G
CREAM TOPICAL ONCE
OUTPATIENT
Start: 2023-06-08 | End: 2023-06-08

## 2023-06-08 RX ORDER — CLOBETASOL PROPIONATE 0.5 MG/G
OINTMENT TOPICAL ONCE
OUTPATIENT
Start: 2023-06-08 | End: 2023-06-08

## 2023-06-08 RX ORDER — LIDOCAINE HYDROCHLORIDE 20 MG/ML
JELLY TOPICAL ONCE
OUTPATIENT
Start: 2023-06-08 | End: 2023-06-08

## 2023-06-08 RX ORDER — SODIUM CHLOR/HYPOCHLOROUS ACID 0.033 %
SOLUTION, IRRIGATION IRRIGATION ONCE
OUTPATIENT
Start: 2023-06-08 | End: 2023-06-08

## 2023-06-08 RX ORDER — IBUPROFEN 200 MG
TABLET ORAL ONCE
OUTPATIENT
Start: 2023-06-08 | End: 2023-06-08

## 2023-06-08 RX ORDER — GINSENG 100 MG
CAPSULE ORAL ONCE
OUTPATIENT
Start: 2023-06-08 | End: 2023-06-08

## 2023-06-08 RX ORDER — BACITRACIN ZINC AND POLYMYXIN B SULFATE 500; 1000 [USP'U]/G; [USP'U]/G
OINTMENT TOPICAL ONCE
OUTPATIENT
Start: 2023-06-08 | End: 2023-06-08

## 2023-06-08 RX ORDER — BETAMETHASONE DIPROPIONATE 0.05 %
OINTMENT (GRAM) TOPICAL ONCE
OUTPATIENT
Start: 2023-06-08 | End: 2023-06-08

## 2023-06-08 RX ORDER — LIDOCAINE HYDROCHLORIDE 40 MG/ML
SOLUTION TOPICAL ONCE
OUTPATIENT
Start: 2023-06-08 | End: 2023-06-08

## 2023-06-08 NOTE — WOUND CARE
Multilayer Compression Wrap   (Not Unna) Below the Knee    NAME:  Destiney Brown  YOB: 1940  MEDICAL RECORD NUMBER:  [id number]  DATE:  6/8/2023    Multilayer compression wrap: Removed old Multilayer wrap if indicated and wash leg with mild soap/water. Applied primary and secondary dressing as ordered. Applied multilayered dressing below the knee to right lower leg. Instructed patient/caregiver not to remove dressing and to keep it clean and dry. Instructed patient/caregiver on complications to report to provider, such as pain, numbness in toes, heavy drainage, and slippage of dressing. Instructed patient on purpose of compression dressing and on activity and exercise recommendations.       Electronically signed by Matt Guidry PT, [address] [facility] [facility] on 6/8/2023 at 10:27 AM

## 2023-06-08 NOTE — FLOWSHEET NOTE
06/08/23 1021   Right Leg Edema Point of Measurement   Leg circumference 35 cm   Ankle circumference 22.5 cm   Foot circumference 22.5 cm   Compression Therapy 2 layer compression wrap   Wound 04/20/23 Pretibial Right; Anterior; Lower #1   Date First Assessed/Time First Assessed: 04/20/23 1053   Present on Hospital Admission: Yes  Wound Approximate Age at First Assessment (Weeks): 6 weeks  Primary Wound Type: Venous Ulcer  Location: Pretibial  Wound Location Orientation: Right; Anterior;. ..    Wound Image    Wound Etiology Venous   Dressing Status Intact   Wound Cleansed Soap and water   Dressing/Treatment Hydrofera blue   Wound Length (cm) 0 cm   Wound Width (cm) 0 cm   Wound Depth (cm) 0 cm   Wound Surface Area (cm^2) 0 cm^2   Change in Wound Size % (l*w) 100   Wound Volume (cm^3) 0 cm^3   Wound Healing % 100   Wound Assessment Epithelialization   Drainage Amount None   Odor None   Melba-wound Assessment Hemosiderin staining (brown yellow)   Pain Assessment   Pain Assessment None - Denies Pain     Patient is currently taking Eliquis

## 2023-06-22 ENCOUNTER — HOSPITAL ENCOUNTER (OUTPATIENT)
Dept: WOUND CARE | Age: 83
Discharge: HOME | End: 2023-06-22
Payer: MEDICARE

## 2023-06-22 VITALS
SYSTOLIC BLOOD PRESSURE: 114 MMHG | BODY MASS INDEX: 29.8 KG/M2 | HEART RATE: 71 BPM | HEIGHT: 66 IN | WEIGHT: 185.4 LBS | DIASTOLIC BLOOD PRESSURE: 79 MMHG

## 2023-06-22 PROCEDURE — 99213 OFFICE O/P EST LOW 20 MIN: CPT

## 2023-06-22 PROCEDURE — 99213 OFFICE O/P EST LOW 20 MIN: CPT | Performed by: SURGERY

## 2023-06-22 NOTE — DISCHARGE INSTRUCTIONS
Right anterior lower leg healed! Moisturize both lower legs. May wear compression socks for extra support. Elevate legs when sitting.

## 2023-06-22 NOTE — PROGRESS NOTES
[address]  Consult Note/H&P/Progress Note      Sundeep Hopkins  MEDICAL RECORD NUMBER:  [id number]  AGE: 79 y.o. RACE White (non-)  GENDER: female  : 1940  EPISODE DATE:  2023       Subjective:      CC (Chief Complaint) and HISTORY of PRESENT ILLNESS (HPI):    Sundeep Hopkins is a 79 y.o. female who presents today for wound/ulcer evaluation. She reported a traumatic injury of her right lower extremity when a coat-tree fell on her leg in the garage on 3/13/23    She was on Eliquis at the time and had bleeding with had to be seen in the emergency room. Her hemoglobin dropped 2 points but she did not require transfusion. She had local wound care and subsequently developed infection treated with doxycycline and later clindamycin. She came to the wound center for first visit on 23. She denies history of diabetes or tobacco use. She had easily palpable pedal pulses bilaterally. 3/19/23 Xray right tib/fib  Hx: Laceration and anterior aspect in the lower right leg     FINDINGS: Osteoarthritis is present in the knee and ankle. There is no displaced fracture or dislocation. Soft tissues are normal.     Impression:  No displaced fracture. PAST MEDICAL HISTORY  Past Medical History:   Diagnosis Date    Acute renal failure (ARF) (HCC)     Arrhythmia     Arthritis          Chronic pain     arthritis in knees    Coronary artery disease of native artery of native heart with stable angina pectoris ([facility] [address] 2013    prior thrombotic occlusion of the LAD. No obstructive disease at cath  2002 Aug 2013: Cath no obstructive CAD 2016 - EF 60-65%.  pseudonormal diastolic function, RVSP  - Lexiscan MPI - normal EKG, perfusion and LVEF Oct 2021- Echo - EF 50-55%, mild to mod MR, bi atrial enlargement, mild  RVE, RVSP 44 Aug 2022 - normal perfusion, EF 66%     Dyslipidemia     GERD

## 2023-06-22 NOTE — WOUND CARE
Discharge Instructions for  Sangeetha Solorio  [address] [place] [address] [address], [address] [facility] [place] [facility] [place]  Phone [phone]   Fax [phone]      NAME:  Jr Hernandez  YOB: 1940  MEDICAL RECORD NUMBER:  [id number]  DATE:  6/22/2023    Return Appointment:   discharge with Douglas Hoskins MD      Instructions:   Right anterior lower leg healed! Moisturize both lower legs. May wear compression socks for extra support. Elevate legs when sitting. Should you experience increased redness, swelling, pain, foul odor, size of wound(s), or have a temperature over 101 degrees please contact the [address] [place] [address] Road at [phone] or if after hours contact your primary care physician or go to the hospital emergency department. PLEASE NOTE: IF YOU ARE UNABLE TO OBTAIN WOUND SUPPLIES, CONTINUE TO USE THE SUPPLIES YOU HAVE AVAILABLE UNTIL YOU ARE ABLE TO REACH US. IT IS MOST IMPORTANT TO KEEP THE WOUND COVERED AT ALL TIMES. Electronically signed Adwoa Waller.  Violet Bowie RN on 6/22/2023 at 10:04 AM

## 2023-06-22 NOTE — FLOWSHEET NOTE
06/22/23 0948   Right Leg Edema Point of Measurement   Leg circumference 36 cm   Ankle circumference 22 cm   Foot circumference 21 cm   Compression Therapy 2 layer compression wrap   Wound 04/20/23 Pretibial Right; Anterior; Lower #1   Date First Assessed/Time First Assessed: 04/20/23 1053   Present on Hospital Admission: Yes  Wound Approximate Age at First Assessment (Weeks): 6 weeks  Primary Wound Type: Venous Ulcer  Location: Pretibial  Wound Location Orientation: Right; Anterior;. .. Wound Image    Wound Etiology Venous   Dressing Status Intact; Old drainage noted   Wound Cleansed Cleansed with saline   Dressing/Treatment Hydrofera blue   Wound Length (cm) 0.1 cm   Wound Width (cm) 0.1 cm   Wound Depth (cm) 0.1 cm   Wound Surface Area (cm^2) 0.01 cm^2   Change in Wound Size % (l*w) 99.99   Wound Volume (cm^3) 0.001 cm^3   Wound Healing % 100   Wound Assessment Epithelialization   Drainage Amount None   Odor None   Melba-wound Assessment Hemosiderin staining (brown yellow)     Patient is taking Eliquis daily.

## 2023-06-26 ENCOUNTER — OFFICE VISIT (OUTPATIENT)
Dept: UROLOGY | Age: 83
End: 2023-06-26
Payer: MEDICARE

## 2023-06-26 DIAGNOSIS — R39.15 URINARY URGENCY: ICD-10-CM

## 2023-06-26 DIAGNOSIS — N39.0 RECURRENT UTI: Primary | ICD-10-CM

## 2023-06-26 LAB
BILIRUBIN, URINE, POC: NEGATIVE
BLOOD URINE, POC: NORMAL
GLUCOSE URINE, POC: NEGATIVE
KETONES, URINE, POC: NEGATIVE
LEUKOCYTE ESTERASE, URINE, POC: NORMAL
NITRITE, URINE, POC: POSITIVE
PH, URINE, POC: 6 (ref 4.6–8)
PROTEIN,URINE, POC: NEGATIVE
SPECIFIC GRAVITY, URINE, POC: 1.02 (ref 1–1.03)
URINALYSIS CLARITY, POC: NORMAL
URINALYSIS COLOR, POC: NORMAL
UROBILINOGEN, POC: NORMAL

## 2023-06-26 PROCEDURE — 1123F ACP DISCUSS/DSCN MKR DOCD: CPT | Performed by: NURSE PRACTITIONER

## 2023-06-26 PROCEDURE — G8428 CUR MEDS NOT DOCUMENT: HCPCS | Performed by: NURSE PRACTITIONER

## 2023-06-26 PROCEDURE — G8417 CALC BMI ABV UP PARAM F/U: HCPCS | Performed by: NURSE PRACTITIONER

## 2023-06-26 PROCEDURE — G8400 PT W/DXA NO RESULTS DOC: HCPCS | Performed by: NURSE PRACTITIONER

## 2023-06-26 PROCEDURE — 81003 URINALYSIS AUTO W/O SCOPE: CPT | Performed by: NURSE PRACTITIONER

## 2023-06-26 PROCEDURE — 99214 OFFICE O/P EST MOD 30 MIN: CPT | Performed by: NURSE PRACTITIONER

## 2023-06-26 PROCEDURE — 1036F TOBACCO NON-USER: CPT | Performed by: NURSE PRACTITIONER

## 2023-06-26 PROCEDURE — 1090F PRES/ABSN URINE INCON ASSESS: CPT | Performed by: NURSE PRACTITIONER

## 2023-06-26 ASSESSMENT — ENCOUNTER SYMPTOMS
NAUSEA: 0
BACK PAIN: 0

## 2023-06-28 PROCEDURE — 93296 REM INTERROG EVL PM/IDS: CPT | Performed by: INTERNAL MEDICINE

## 2023-06-28 PROCEDURE — 93294 REM INTERROG EVL PM/LDLS PM: CPT | Performed by: INTERNAL MEDICINE

## 2023-07-21 ENCOUNTER — PATIENT MESSAGE (OUTPATIENT)
Age: 83
End: 2023-07-21

## 2023-07-21 NOTE — TELEPHONE ENCOUNTER
From: Cecilia Brown  To: Dr. Mimi Duran: 7/21/2023 11:54 AM EDT  Subject: Prescription refill    this is my account number and the phone number for the [address] [place] [address]  that provides me with Eliquis. [employer] [id number]  Their phone number is [phone]  Fax number is [phone]    I will run out of my rx next week.  thank you

## 2023-07-21 NOTE — TELEPHONE ENCOUNTER
Medication, strength, directions and requested pharmacy reviewed/verified. Prescription pending physician's approval. To be faxed as requested.

## 2023-08-22 ENCOUNTER — PATIENT MESSAGE (OUTPATIENT)
Age: 83
End: 2023-08-22

## 2023-08-22 RX ORDER — CHLORTHALIDONE 25 MG/1
25 TABLET ORAL DAILY
Qty: 90 TABLET | Refills: 2 | Status: SHIPPED | OUTPATIENT
Start: 2023-08-22

## 2023-08-22 NOTE — TELEPHONE ENCOUNTER
From: Osvaldo Diaz Solder  To: Dr. Colmenares Hussein: 2023 11:30 AM EDT  Subject: Prescription refill    Dr. Kimberly Cochran, my rx for chlorthalidone 25 mgm  on 8/15/2023/ [facility]   has not gotten confirmation yet to do so. . Please renew my rx.  thank you  1940  Joann Stafford

## 2023-08-22 NOTE — TELEPHONE ENCOUNTER
Requested Prescriptions     Pending Prescriptions Disp Refills    chlorthalidone (HYGROTON) 25 MG tablet 90 tablet 2     Sig: Take 1 tablet by mouth daily

## 2023-10-02 ENCOUNTER — OFFICE VISIT (OUTPATIENT)
Dept: ORTHOPEDIC SURGERY | Age: 83
End: 2023-10-02
Payer: MEDICARE

## 2023-10-02 VITALS — BODY MASS INDEX: 25.74 KG/M2 | HEIGHT: 67 IN | WEIGHT: 164 LBS

## 2023-10-02 DIAGNOSIS — M54.2 NECK PAIN: Primary | ICD-10-CM

## 2023-10-02 DIAGNOSIS — G56.02 LEFT CARPAL TUNNEL SYNDROME: ICD-10-CM

## 2023-10-02 PROCEDURE — G8484 FLU IMMUNIZE NO ADMIN: HCPCS | Performed by: ORTHOPAEDIC SURGERY

## 2023-10-02 PROCEDURE — 99203 OFFICE O/P NEW LOW 30 MIN: CPT | Performed by: ORTHOPAEDIC SURGERY

## 2023-10-02 PROCEDURE — 1036F TOBACCO NON-USER: CPT | Performed by: ORTHOPAEDIC SURGERY

## 2023-10-02 PROCEDURE — G8427 DOCREV CUR MEDS BY ELIG CLIN: HCPCS | Performed by: ORTHOPAEDIC SURGERY

## 2023-10-02 PROCEDURE — 1090F PRES/ABSN URINE INCON ASSESS: CPT | Performed by: ORTHOPAEDIC SURGERY

## 2023-10-02 PROCEDURE — 1123F ACP DISCUSS/DSCN MKR DOCD: CPT | Performed by: ORTHOPAEDIC SURGERY

## 2023-10-02 PROCEDURE — G8417 CALC BMI ABV UP PARAM F/U: HCPCS | Performed by: ORTHOPAEDIC SURGERY

## 2023-10-02 PROCEDURE — G8400 PT W/DXA NO RESULTS DOC: HCPCS | Performed by: ORTHOPAEDIC SURGERY

## 2023-10-02 NOTE — PROGRESS NOTES
Orthopaedic Hand Surgery Note    Name: Sergio Corona  YOB: 1940  Gender: female  MRN: [id number]    CC: New patient referred for hand numbness      HPI: Patient is a 79 y.o. female with a chief complaint of left hand numbness and tingling in the median nerve distribution, as well as paresthesias radiating up to the level of the elbow. The symptoms have been going on for years. The patient does complain of night wakening and increased symptoms with driving. Evaluation to date has included nerve conduction study in 2018. At that time, she underwent R CTR with Dr. Alysa Crowell. Symptoms in the left hand were quite mild at that time. . Treatment to date has included braces. ROS/Meds/PSH/PMH/FH/SH: I personally reviewed the patients standard intake form. Pertinents are discussed In the HPI    Physical Examination:    Musculoskeletal:   Cervical spine has very limited range of motion without tenderness to palpation, negative Spurling's test.     Examination of the left upper extremity demonstrates Decreased sensation to light touch in the median distribution, normal sensation in ulnar and radial distribution, Positive carpal tunnel compression testing and Phalen testing, cap refill < 5 seconds in all fingers. Inspection reveals mild thenar atrophy. Negative Tinel and elbow flexion compression test of the cubital tunnel, negative Tinel over Guyon's canal. Sensation to light touch in the ulnar 2 digits is normal with no intrinsic atrophy/weakness. No tenderness to palpation or masses noted in the forearm. Imaging / Electrodiagnostic Tests:     Cervical Spine XR: AP, Lateral views     Clinical Indication  1. Neck pain    2. Left carpal tunnel syndrome           Report: AP, lateral x-ray of the cervical spine demonstrates multilevel degenerative changes, particularly at C6-7 and C7-T1. There is a fusion of C5 and C6    Impression: as above     Chauncey Goodman MD         Assessment:     ICD-10-CM    1.  Neck

## 2023-10-03 NOTE — PROGRESS NOTES
[facility] CARDIOLOGY  [address] [address]  PHONE: [phone]      10/04/23    NAME:  Hazel Hercules  : 1940  MRN: [id number]         SUBJECTIVE:   Hazel Hercules is a 79 y.o. female seen for a follow up visit regarding the following:     Chief Complaint   Patient presents with    Follow-up    Atrial Fibrillation            HPI:  Follow up  Follow-up and Atrial Fibrillation   . Follow up afib, highly symptomatic when in afib and ultimately AVN ablation and biventricular pacing. Chronic venous stasis with edema. Compression socks as weather allows. She feels well, no palpitations or chest symptoms. Exercising, stays active, no chest pain or dyspnea. She does have back and shoulder pain that will radiate sometime to her chest but doesn't feel like angina, disc disease cervical and thoracic spine. Past cardiac history:   prior thrombotic occlusion of the LAD. No obstructive disease at cath 2002   Aug 2013: Cath no obstructive CAD   2016 - EF 60-65%. pseudonormal diastolic function, RVSP 43   2017 - Lexiscan MPI - normal EKG, perfusion and LVEF   2017 - 24 hour  Monitor - NSR, occasional PAC, 2-3 episodes brief atrial tachycardia, longest 14 beats, asymptomatic.  2 episodes AIVR during sleep, rate 64, longest 4 beats. No diary entries   Oct 2021- afib   Oct 2021- Echo - EF 50-55%, mild to mod MR, bi atrial enlargement, mild RVE, RVSP 44-no significant change from previous   10/28/21 - successful DCCV but with early return to AF   Mar 2022- DCPM for symptomatic lenora with reactive ATP for AF (Dr Pedrito Cartre)  Aug 2022        Echo - normal SF, ind DF d/t afib (E/e' 9), WEI, normal RVSP, no significant valve disease  Oct 2022       AVN ablation, biventricular pacemaker - Dr Margarita Moreno CAD CHF Meds            chlorthalidone (HYGROTON) 25 MG tablet (Taking)    Sig - Route:  Take 1 tablet by mouth daily - Oral    apixaban

## 2023-10-04 ENCOUNTER — OFFICE VISIT (OUTPATIENT)
Age: 83
End: 2023-10-04
Payer: MEDICARE

## 2023-10-04 VITALS
WEIGHT: 162 LBS | DIASTOLIC BLOOD PRESSURE: 74 MMHG | HEIGHT: 67 IN | BODY MASS INDEX: 25.43 KG/M2 | HEART RATE: 74 BPM | SYSTOLIC BLOOD PRESSURE: 122 MMHG

## 2023-10-04 DIAGNOSIS — I10 ESSENTIAL HYPERTENSION: ICD-10-CM

## 2023-10-04 DIAGNOSIS — I49.5 SICK SINUS SYNDROME (HCC): ICD-10-CM

## 2023-10-04 DIAGNOSIS — I21.9 CORONARY ARTERY THROMBOSIS WITH MYOCARDIAL INFARCTION (HCC): ICD-10-CM

## 2023-10-04 DIAGNOSIS — I48.21 PERMANENT ATRIAL FIBRILLATION (HCC): Primary | ICD-10-CM

## 2023-10-04 PROCEDURE — G8427 DOCREV CUR MEDS BY ELIG CLIN: HCPCS | Performed by: INTERNAL MEDICINE

## 2023-10-04 PROCEDURE — G8400 PT W/DXA NO RESULTS DOC: HCPCS | Performed by: INTERNAL MEDICINE

## 2023-10-04 PROCEDURE — G8417 CALC BMI ABV UP PARAM F/U: HCPCS | Performed by: INTERNAL MEDICINE

## 2023-10-04 PROCEDURE — 1036F TOBACCO NON-USER: CPT | Performed by: INTERNAL MEDICINE

## 2023-10-04 PROCEDURE — 3078F DIAST BP <80 MM HG: CPT | Performed by: INTERNAL MEDICINE

## 2023-10-04 PROCEDURE — 3074F SYST BP LT 130 MM HG: CPT | Performed by: INTERNAL MEDICINE

## 2023-10-04 PROCEDURE — 1090F PRES/ABSN URINE INCON ASSESS: CPT | Performed by: INTERNAL MEDICINE

## 2023-10-04 PROCEDURE — G8484 FLU IMMUNIZE NO ADMIN: HCPCS | Performed by: INTERNAL MEDICINE

## 2023-10-04 PROCEDURE — 1123F ACP DISCUSS/DSCN MKR DOCD: CPT | Performed by: INTERNAL MEDICINE

## 2023-10-04 PROCEDURE — 99214 OFFICE O/P EST MOD 30 MIN: CPT | Performed by: INTERNAL MEDICINE

## 2023-10-04 RX ORDER — LOSARTAN POTASSIUM 50 MG/1
50 TABLET ORAL DAILY
Qty: 90 TABLET | Refills: 3 | Status: SHIPPED | OUTPATIENT
Start: 2023-10-04

## 2023-10-04 RX ORDER — CHLORTHALIDONE 25 MG/1
25 TABLET ORAL DAILY
Qty: 90 TABLET | Refills: 2 | Status: SHIPPED | OUTPATIENT
Start: 2023-10-04

## 2023-10-04 RX ORDER — NITROGLYCERIN 0.4 MG/1
TABLET SUBLINGUAL
Qty: 25 TABLET | Refills: 1 | Status: SHIPPED | OUTPATIENT
Start: 2023-10-04

## 2023-10-04 RX ORDER — ISOSORBIDE MONONITRATE 30 MG/1
30 TABLET, EXTENDED RELEASE ORAL DAILY
Qty: 90 TABLET | Refills: 3 | Status: SHIPPED | OUTPATIENT
Start: 2023-10-04

## 2023-10-04 RX ORDER — METOPROLOL SUCCINATE 50 MG/1
50 TABLET, EXTENDED RELEASE ORAL DAILY
Qty: 90 TABLET | Refills: 3 | Status: SHIPPED | OUTPATIENT
Start: 2023-10-04

## 2023-10-04 ASSESSMENT — ENCOUNTER SYMPTOMS
SHORTNESS OF BREATH: 0
BACK PAIN: 1

## 2023-10-19 ENCOUNTER — PROCEDURE VISIT (OUTPATIENT)
Dept: PHYSICAL MEDICINE AND REHAB | Age: 83
End: 2023-10-19

## 2023-10-19 VITALS
SYSTOLIC BLOOD PRESSURE: 133 MMHG | WEIGHT: 162 LBS | HEART RATE: 78 BPM | DIASTOLIC BLOOD PRESSURE: 90 MMHG | HEIGHT: 67 IN | BODY MASS INDEX: 25.43 KG/M2

## 2023-10-19 DIAGNOSIS — G56.02 LEFT CARPAL TUNNEL SYNDROME: Primary | ICD-10-CM

## 2023-10-23 NOTE — WOUND CARE
Multilayer Compression Wrap   (Not Unna) Below the Knee    NAME:  Destiney Brown  YOB: 1940  MEDICAL RECORD NUMBER:  [id number]  DATE:  5/4/2023    Multilayer compression wrap: Removed old Multilayer wrap if indicated and wash leg with mild soap/water. Applied moisturizing agent to dry skin as needed. Applied primary and secondary dressing as ordered. Applied multilayered dressing below the knee to right lower leg. Instructed patient/caregiver not to remove dressing and to keep it clean and dry. Instructed patient/caregiver on complications to report to provider, such as pain, numbness in toes, heavy drainage, and slippage of dressing. Instructed patient on purpose of compression dressing and on activity and exercise recommendations.       Electronically signed by Yoly Castro RN on 5/4/2023 at 10:06 AM Spoke with patient reminding them to go for lab work and bring BP readings and machine to their appt on  10/30. He expressed understanding and thanked us for the reminder call.

## 2023-10-26 ENCOUNTER — TELEPHONE (OUTPATIENT)
Dept: ORTHOPEDIC SURGERY | Age: 83
End: 2023-10-26

## 2023-10-26 NOTE — TELEPHONE ENCOUNTER
Please call patient , she is has had emg and shows she need sx, can I add her on 10/31 or do you set that up

## 2023-10-31 ENCOUNTER — OFFICE VISIT (OUTPATIENT)
Dept: ORTHOPEDIC SURGERY | Age: 83
End: 2023-10-31
Payer: MEDICARE

## 2023-10-31 DIAGNOSIS — G56.02 LEFT CARPAL TUNNEL SYNDROME: Primary | ICD-10-CM

## 2023-10-31 PROCEDURE — 1090F PRES/ABSN URINE INCON ASSESS: CPT | Performed by: ORTHOPAEDIC SURGERY

## 2023-10-31 PROCEDURE — 99214 OFFICE O/P EST MOD 30 MIN: CPT | Performed by: ORTHOPAEDIC SURGERY

## 2023-10-31 PROCEDURE — G8417 CALC BMI ABV UP PARAM F/U: HCPCS | Performed by: ORTHOPAEDIC SURGERY

## 2023-10-31 PROCEDURE — 1036F TOBACCO NON-USER: CPT | Performed by: ORTHOPAEDIC SURGERY

## 2023-10-31 PROCEDURE — G8484 FLU IMMUNIZE NO ADMIN: HCPCS | Performed by: ORTHOPAEDIC SURGERY

## 2023-10-31 PROCEDURE — 1123F ACP DISCUSS/DSCN MKR DOCD: CPT | Performed by: ORTHOPAEDIC SURGERY

## 2023-10-31 PROCEDURE — G8400 PT W/DXA NO RESULTS DOC: HCPCS | Performed by: ORTHOPAEDIC SURGERY

## 2023-10-31 PROCEDURE — G8428 CUR MEDS NOT DOCUMENT: HCPCS | Performed by: ORTHOPAEDIC SURGERY

## 2023-10-31 NOTE — H&P (VIEW-ONLY)
Orthopaedic Hand Surgery Note    Name: Jennifer Coombs  YOB: 1940  Gender: female  MRN: [id number]    CC: Follow up of hand numbness    HPI: Patient is a 79 y.o. female who is here regarding follow up for  hand numbness and tingling. She is here to review her nerve conduction study. ROS/Meds/PSH/PMH/FH/SH: I personally reviewed the patients standard intake form. Pertinents are discussed in the HPI    Physical Examination:  Musculoskeletal:     Examination of the left upper extremity demonstrates Decreased sensation to light touch in the median distribution, normal sensation in ulnar and radial distribution, Positive carpal tunnel compression testing and Phalen testing, cap refill < 5 seconds in all fingers. Inspection reveals mild thenar atrophy. Negative Tinel and elbow flexion compression test of the cubital tunnel, negative Tinel over Guyon's canal. Sensation to light touch in the ulnar 2 digits is normal with no intrinsic atrophy/weakness. No tenderness to palpation or masses noted in the forearm. Imaging / Electrodiagnostic Tests:     I independently reviewed and interpreted the patient's nerve conduction study. She has findings consistent with very severe left carpal tunnel syndrome    Ultrasound evaluation of the Left wrist and carpal tunnel (limited, nonvascular)   Indications: Left carpal tunnel syndrome  Findings: Median nerve cross sectional area is 12mm at the level of the lunate (normal < 10mm). Transverse safe zone is >4mm. No other abnormalities were noted. Conclusion: increased median nerve cross sectional area, consistent with carpal tunnel syndrome      Assessment:     ICD-10-CM    1. Left carpal tunnel syndrome  G56.02 Case Request          Plan:  We discussed the diagnosis and different treatment options. We discussed observation, EMG/NCV, night splinting, cortisone injections and surgical decompression and the risks and benefits of all were clearly outlined.  After

## 2023-10-31 NOTE — PROGRESS NOTES
discussing in detail the patient elects to proceed with surgical treatment. She understands there is a possibility of incomplete symptom resolution due to the severity of her carpal tunnel syndrome. Patient understands risks and benefits of ultrasound-guided left carpal tunnel release including but not limited to nerve injury, vessel injury, infection, failure to achieve desired results and possible need for additional surgery. Patient understands and wishes to proceed with surgery. On Exam:   The patient is alert and oriented  Cardiovascular: regular rate and rhythm  Respiratory: Non labored breathing       Patient voiced accordance and understanding of the information provided and the formulated plan. All questions were answered to the patient's satisfaction during the encounter.       Barbie Osborne MD  Orthopaedic Surgery  10/31/23  1:00 PM

## 2023-11-02 ENCOUNTER — TELEPHONE (OUTPATIENT)
Dept: ORTHOPEDIC SURGERY | Age: 83
End: 2023-11-02

## 2023-11-06 ENCOUNTER — TELEPHONE (OUTPATIENT)
Dept: ORTHOPEDIC SURGERY | Age: 83
End: 2023-11-06

## 2023-11-06 NOTE — TELEPHONE ENCOUNTER
I spoke w/ Ms. Brown and she informed me that she was getting concerned about the pre-assessment but about 2 hrs after she left us the message, the hospital called her. Everything is good. I told her that the hospital will call her tomorrow afternoon to let her know what time to be at the sx center and she stated that the nurse told her the same thing. She voiced understanding.

## 2023-11-06 NOTE — PERIOP NOTE
Patient verified name and . Order for consent NOT found in EHR at time of PAT visit. Unable to verify case posting against order; surgery verified by patient. Type 1a surgery, PAT phone assessment complete. Orders not received. Labs per surgeon: unknown; no orders received    Labs per anesthesia protocol: none indicated  Patient has a medtronic pacemaker. Patient is pacemaker dependent. Per Dr. Letty Olivares no device rep is needed for DOS. Pacemaker noted in case listing. Patient answered medical/surgical history questions at their best of ability. All prior to admission medications documented in EPIC. Patient instructed to take the following medications the day of surgery according to anesthesia guidelines with a small sip of water: isosorbide, methenamine, dicyclomine (if needed), Eliquis. Patient states she was told by surgeon that she may stay on Eliquis for procedure. Hold all vitamins 7 days prior to surgery and NSAIDS 5 days prior to surgery. Prescription meds to hold:none  Patient instructed on the following:    > Arrive at [facility], time of arrival to be called the day before by 1700  > NPO after midnight, unless otherwise indicated, including gum, mints, and ice chips  > Responsible adult must drive patient to the hospital, stay during surgery, and patient will need supervision 24 hours after anesthesia  > Use antibacterial soap in shower the night before surgery and on the morning of surgery  > All piercings must be removed prior to arrival.    > Leave all valuables (money and jewelry) at home but bring insurance card and ID on DOS.   > You may be required to pay a deductible or co-pay on the day of your procedure. You can pre-pay by calling [phone] if your surgery is at the [facility] or [phone] if your surgery is at the [facility]. > Do not wear make-up, nail polish, lotions, cologne, perfumes, powders, or oil on skin. Artificial nails are not permitted.

## 2023-11-07 RX ORDER — SODIUM CHLORIDE 9 MG/ML
INJECTION, SOLUTION INTRAVENOUS PRN
Status: CANCELLED | OUTPATIENT
Start: 2023-11-07

## 2023-11-07 NOTE — PERIOP NOTE
Preop department called to notify patient of arrival time for scheduled procedure. Instructions given to   - Arrive at [facility]. - Remain NPO after midnight, unless otherwise indicated, including gum, mints, and ice chips. - Have a responsible adult to drive patient to the hospital, stay during surgery, and patient will need supervision 24 hours after anesthesia. - Use antibacterial soap in shower the night before surgery and on the morning of surgery.        Was patient contacted: pt  Voicemail left:   Numbers contacted: [phone]   Arrival time: 2494

## 2023-11-08 ENCOUNTER — ANESTHESIA EVENT (OUTPATIENT)
Dept: SURGERY | Age: 83
End: 2023-11-08
Payer: MEDICARE

## 2023-11-08 ENCOUNTER — HOSPITAL ENCOUNTER (OUTPATIENT)
Age: 83
Setting detail: OUTPATIENT SURGERY
Discharge: HOME | End: 2023-11-08
Attending: ORTHOPAEDIC SURGERY | Admitting: ORTHOPAEDIC SURGERY
Payer: MEDICARE

## 2023-11-08 ENCOUNTER — ANESTHESIA (OUTPATIENT)
Dept: SURGERY | Age: 83
End: 2023-11-08
Payer: MEDICARE

## 2023-11-08 VITALS
RESPIRATION RATE: 23 BRPM | BODY MASS INDEX: 25.74 KG/M2 | HEIGHT: 67 IN | TEMPERATURE: 97.7 F | SYSTOLIC BLOOD PRESSURE: 155 MMHG | HEART RATE: 69 BPM | OXYGEN SATURATION: 96 % | DIASTOLIC BLOOD PRESSURE: 72 MMHG | WEIGHT: 164 LBS

## 2023-11-08 DIAGNOSIS — G56.02 LEFT CARPAL TUNNEL SYNDROME: Primary | ICD-10-CM

## 2023-11-08 PROCEDURE — 7100000010 HC PHASE II RECOVERY - FIRST 15 MIN: Performed by: ORTHOPAEDIC SURGERY

## 2023-11-08 PROCEDURE — 2720000010 HC SURG SUPPLY STERILE: Performed by: ORTHOPAEDIC SURGERY

## 2023-11-08 PROCEDURE — 3700000000 HC ANESTHESIA ATTENDED CARE: Performed by: ORTHOPAEDIC SURGERY

## 2023-11-08 PROCEDURE — 2500000003 HC RX 250 WO HCPCS: Performed by: ORTHOPAEDIC SURGERY

## 2023-11-08 PROCEDURE — 6360000002 HC RX W HCPCS: Performed by: ORTHOPAEDIC SURGERY

## 2023-11-08 PROCEDURE — 2580000003 HC RX 258: Performed by: ANESTHESIOLOGY

## 2023-11-08 PROCEDURE — 3600000012 HC SURGERY LEVEL 2 ADDTL 15MIN: Performed by: ORTHOPAEDIC SURGERY

## 2023-11-08 PROCEDURE — 2500000003 HC RX 250 WO HCPCS: Performed by: NURSE ANESTHETIST, CERTIFIED REGISTERED

## 2023-11-08 PROCEDURE — 2709999900 HC NON-CHARGEABLE SUPPLY: Performed by: ORTHOPAEDIC SURGERY

## 2023-11-08 PROCEDURE — 6360000002 HC RX W HCPCS: Performed by: NURSE ANESTHETIST, CERTIFIED REGISTERED

## 2023-11-08 PROCEDURE — 7100000001 HC PACU RECOVERY - ADDTL 15 MIN: Performed by: ORTHOPAEDIC SURGERY

## 2023-11-08 PROCEDURE — 7100000000 HC PACU RECOVERY - FIRST 15 MIN: Performed by: ORTHOPAEDIC SURGERY

## 2023-11-08 PROCEDURE — 3700000001 HC ADD 15 MINUTES (ANESTHESIA): Performed by: ORTHOPAEDIC SURGERY

## 2023-11-08 PROCEDURE — 3600000002 HC SURGERY LEVEL 2 BASE: Performed by: ORTHOPAEDIC SURGERY

## 2023-11-08 RX ORDER — BUPIVACAINE HYDROCHLORIDE 2.5 MG/ML
INJECTION, SOLUTION EPIDURAL; INFILTRATION; INTRACAUDAL PRN
Status: DISCONTINUED | OUTPATIENT
Start: 2023-11-08 | End: 2023-11-08 | Stop reason: ALTCHOICE

## 2023-11-08 RX ORDER — LIDOCAINE HYDROCHLORIDE 10 MG/ML
1 INJECTION, SOLUTION INFILTRATION; PERINEURAL
Status: DISCONTINUED | OUTPATIENT
Start: 2023-11-08 | End: 2023-11-08 | Stop reason: HOSPADM

## 2023-11-08 RX ORDER — LIDOCAINE HYDROCHLORIDE 10 MG/ML
INJECTION, SOLUTION INFILTRATION; PERINEURAL PRN
Status: DISCONTINUED | OUTPATIENT
Start: 2023-11-08 | End: 2023-11-08 | Stop reason: ALTCHOICE

## 2023-11-08 RX ORDER — HYDROMORPHONE HYDROCHLORIDE 2 MG/ML
0.25 INJECTION, SOLUTION INTRAMUSCULAR; INTRAVENOUS; SUBCUTANEOUS EVERY 5 MIN PRN
Status: DISCONTINUED | OUTPATIENT
Start: 2023-11-08 | End: 2023-11-08 | Stop reason: HOSPADM

## 2023-11-08 RX ORDER — SODIUM CHLORIDE 0.9 % (FLUSH) 0.9 %
5-40 SYRINGE (ML) INJECTION EVERY 12 HOURS SCHEDULED
Status: DISCONTINUED | OUTPATIENT
Start: 2023-11-08 | End: 2023-11-08 | Stop reason: HOSPADM

## 2023-11-08 RX ORDER — DEXMEDETOMIDINE HYDROCHLORIDE 100 UG/ML
INJECTION, SOLUTION INTRAVENOUS PRN
Status: DISCONTINUED | OUTPATIENT
Start: 2023-11-08 | End: 2023-11-08 | Stop reason: SDUPTHER

## 2023-11-08 RX ORDER — SODIUM CHLORIDE 0.9 % (FLUSH) 0.9 %
5-40 SYRINGE (ML) INJECTION PRN
Status: DISCONTINUED | OUTPATIENT
Start: 2023-11-08 | End: 2023-11-08 | Stop reason: HOSPADM

## 2023-11-08 RX ORDER — SODIUM CHLORIDE, SODIUM LACTATE, POTASSIUM CHLORIDE, CALCIUM CHLORIDE 600; 310; 30; 20 MG/100ML; MG/100ML; MG/100ML; MG/100ML
INJECTION, SOLUTION INTRAVENOUS CONTINUOUS
Status: DISCONTINUED | OUTPATIENT
Start: 2023-11-08 | End: 2023-11-08 | Stop reason: HOSPADM

## 2023-11-08 RX ORDER — PROPOFOL 10 MG/ML
INJECTION, EMULSION INTRAVENOUS PRN
Status: DISCONTINUED | OUTPATIENT
Start: 2023-11-08 | End: 2023-11-08 | Stop reason: SDUPTHER

## 2023-11-08 RX ORDER — FENTANYL CITRATE 50 UG/ML
100 INJECTION, SOLUTION INTRAMUSCULAR; INTRAVENOUS
Status: DISCONTINUED | OUTPATIENT
Start: 2023-11-08 | End: 2023-11-08 | Stop reason: HOSPADM

## 2023-11-08 RX ORDER — PROCHLORPERAZINE EDISYLATE 5 MG/ML
5 INJECTION INTRAMUSCULAR; INTRAVENOUS
Status: DISCONTINUED | OUTPATIENT
Start: 2023-11-08 | End: 2023-11-08 | Stop reason: HOSPADM

## 2023-11-08 RX ORDER — OXYCODONE HYDROCHLORIDE 5 MG/1
2.5 TABLET ORAL
Status: DISCONTINUED | OUTPATIENT
Start: 2023-11-08 | End: 2023-11-08 | Stop reason: HOSPADM

## 2023-11-08 RX ORDER — LIDOCAINE HYDROCHLORIDE 20 MG/ML
INJECTION, SOLUTION EPIDURAL; INFILTRATION; INTRACAUDAL; PERINEURAL PRN
Status: DISCONTINUED | OUTPATIENT
Start: 2023-11-08 | End: 2023-11-08 | Stop reason: SDUPTHER

## 2023-11-08 RX ORDER — ACETAMINOPHEN AND CODEINE PHOSPHATE 300; 30 MG/1; MG/1
1 TABLET ORAL EVERY 4 HOURS PRN
Qty: 12 TABLET | Refills: 0 | Status: SHIPPED | OUTPATIENT
Start: 2023-11-08 | End: 2023-11-11

## 2023-11-08 RX ORDER — MIDAZOLAM HYDROCHLORIDE 2 MG/2ML
2 INJECTION, SOLUTION INTRAMUSCULAR; INTRAVENOUS
Status: DISCONTINUED | OUTPATIENT
Start: 2023-11-08 | End: 2023-11-08 | Stop reason: HOSPADM

## 2023-11-08 RX ADMIN — LIDOCAINE HYDROCHLORIDE 60 MG: 20 INJECTION, SOLUTION EPIDURAL; INFILTRATION; INTRACAUDAL; PERINEURAL at 08:31

## 2023-11-08 RX ADMIN — PROPOFOL 50 MG: 10 INJECTION, EMULSION INTRAVENOUS at 08:31

## 2023-11-08 RX ADMIN — SODIUM CHLORIDE, SODIUM LACTATE, POTASSIUM CHLORIDE, AND CALCIUM CHLORIDE: 600; 310; 30; 20 INJECTION, SOLUTION INTRAVENOUS at 07:49

## 2023-11-08 RX ADMIN — DEXMEDETOMIDINE 4 MCG: 100 INJECTION, SOLUTION, CONCENTRATE INTRAVENOUS at 08:31

## 2023-11-08 RX ADMIN — PROPOFOL 50 MCG/KG/MIN: 10 INJECTION, EMULSION INTRAVENOUS at 08:33

## 2023-11-08 RX ADMIN — PROPOFOL 20 MG: 10 INJECTION, EMULSION INTRAVENOUS at 08:36

## 2023-11-08 ASSESSMENT — ENCOUNTER SYMPTOMS: SHORTNESS OF BREATH: 1

## 2023-11-08 NOTE — OP NOTE
Hand Surgery Operative Note      Huyen Nam Solder   79 y.o.   female      Pre-op diagnosis: Left Carpal Tunnel syndrome  Post op diagnosis: same      Procedure: Left Ultrasound-Guided Carpal Tunnel release cpt [id number], [id number]     Surgeon: Evelyne Vance MD     Anesthesia: Local + MAC      Tourniquet time: * No tourniquets in log *      Procedure indications: Patient with radial digit numbness recalcitrant to conservative measures with positive documentation of NCV findings consistent with carpal tunnel syndrome. After Thorough discussion, the patient decided to proceed with surgical management. We discussed in detail surgical risks including scar, pain, bleeding, infection, anesthetic risks, neurovascular injury, need for further surgery,  weakness, stiffness, risk of death and potential risk of other unforseen complication. Procedure description:      Patient was placed in the supine position and after appropriate time-out and side, site and procedure confirmed. Using a sterile ultrasound cover and sterile gel, relevant anatomical landmarks were identified, including but not limited to the median nerve, thenar motor branch/recurrent motor branch of the median nerve, palmar cutaneous branch of the median nerve, median and ulnar digital nerves and any visualized communications, ulnar vessels and superficial palmar arch, palmar fascia and distal transverse carpal ligament. A sterile ink marker was used to darío the borders of the transverse and longitudinal safe zones as well as the incision site in the proximal carpal tunnel region. The safe zones were re-scanned to ensure acceptable anatomy and sonographic visualization. A regional anesthetic was administered. A 25 gauge] needle was used to create a small skin wheal at the incision site using 2 ml of buffered 1% lidocaine plain.  Following this, under the guidance of ultrasound local anesthesia was delivered deep and superficial to the transverse carpal

## 2023-11-10 ENCOUNTER — TELEPHONE (OUTPATIENT)
Dept: ORTHOPEDIC SURGERY | Age: 83
End: 2023-11-10

## 2023-11-10 NOTE — TELEPHONE ENCOUNTER
Notified pt of scheduled post-op appt on 11/20 @ 2:40pm; pt unable to keep this appt and requested morning appt. Post-op appt rescheduled for 11/20 @ 9:00am with Dr. Niki Malik [facility]. Pt notfied and verbalizes understanding of date, time, and location.

## 2023-11-20 ENCOUNTER — OFFICE VISIT (OUTPATIENT)
Dept: ORTHOPEDIC SURGERY | Age: 83
End: 2023-11-20

## 2023-11-20 DIAGNOSIS — G56.02 LEFT CARPAL TUNNEL SYNDROME: Primary | ICD-10-CM

## 2023-11-20 PROCEDURE — 99024 POSTOP FOLLOW-UP VISIT: CPT | Performed by: ORTHOPAEDIC SURGERY

## 2023-11-20 NOTE — PROGRESS NOTES
Orthopaedic Hand Surgery Note    Name: Curt Novak  YOB: 1940  Gender: female  MRN: [id number]    HPI: Patient is status post LEFT ULTRASOUND GUIDED CARPAL TUNNEL RELEASE/ Medtronic pacemaker - Left on 11/8/2023. Patient reports sensation is improved. Night symptoms have resolved. No fevers or chills. Physical Examination:  Wound healing well. Good finger and wrist range of motion. Sensation is improved from preoperative. Assessment:   1. Left carpal tunnel syndrome        Status post LEFT ULTRASOUND GUIDED CARPAL TUNNEL RELEASE/ Medtronic pacemaker - Left on 11/8/2023    Plan:  We discussed the patient can perform range of motion and strengthening exercises as tolerated. I have no activity restrictions at this time.  Patient will follow up as needed    Julia Gore MD  Orthopaedic Surgery  11/20/23  9:11 AM

## 2023-12-05 ENCOUNTER — TELEPHONE (OUTPATIENT)
Age: 83
End: 2023-12-05

## 2023-12-05 ENCOUNTER — PATIENT MESSAGE (OUTPATIENT)
Age: 83
End: 2023-12-05

## 2023-12-05 NOTE — TELEPHONE ENCOUNTER
----- Message from [address], [place] sent at 2023  4:58 PM EST -----  Regarding: FW: edema of lower extremities  Contact: [phone]    ----- Message -----  From: Chan Manzo  Sent: 2023   4:55 PM EST  To: [facility]  Subject: edema of lower extremities                       Dr Raza Burleson. Last week I had a sudden weight gain of 10 lbs overnight. I felt  short of breath. I rested but on exertion was SOB. My feet & legs remain vascular  and bluish. I took my hygroton but only got severe leg cramps. I took one of   Dewayne's furosemide pills and lost 2 1/2 lbs in one day. Yesterday I woke up with lower  leg edema, ankles up to my knees, chikis. my rt leg. At bedtime I had a burning of  my rt leg and noted multiple blisters on my rt leg & redness like a cellulitis. Today I got an appt with my dermatologist, Dr. Lois Sagastume & she uni booted  my rt leg & gave me a Rx for 20mgm of furosemide. She will redo the uni boot on  Thursday & check the blisters. This second dose of meds has relieved the dyspnea. at this time. She was adamant that I notify you. Angel Crump My BP is normal, I slept well and already feel an improvement. Should I make an appointment to see you next wk? I reduced my sodium intake and only take one diuretic a day. Angel Crump Appointment? Andreas FINNEGAN [birth year]  phone [phone]

## 2023-12-06 NOTE — TELEPHONE ENCOUNTER
Advised patient of Dr. Randolph Mahoney response. Scheduled next available appoint with Dr. Bolivar Mead on 12/7/23 at 8:30 am at [facility]. Patient verbalized understanding.

## 2023-12-07 ENCOUNTER — OFFICE VISIT (OUTPATIENT)
Age: 83
End: 2023-12-07
Payer: MEDICARE

## 2023-12-07 VITALS
WEIGHT: 170 LBS | HEART RATE: 78 BPM | DIASTOLIC BLOOD PRESSURE: 78 MMHG | BODY MASS INDEX: 26.68 KG/M2 | HEIGHT: 67 IN | SYSTOLIC BLOOD PRESSURE: 138 MMHG

## 2023-12-07 DIAGNOSIS — R60.9 PERIPHERAL EDEMA: Primary | ICD-10-CM

## 2023-12-07 PROCEDURE — G8428 CUR MEDS NOT DOCUMENT: HCPCS | Performed by: INTERNAL MEDICINE

## 2023-12-07 PROCEDURE — 3075F SYST BP GE 130 - 139MM HG: CPT | Performed by: INTERNAL MEDICINE

## 2023-12-07 PROCEDURE — 1036F TOBACCO NON-USER: CPT | Performed by: INTERNAL MEDICINE

## 2023-12-07 PROCEDURE — G8484 FLU IMMUNIZE NO ADMIN: HCPCS | Performed by: INTERNAL MEDICINE

## 2023-12-07 PROCEDURE — G8417 CALC BMI ABV UP PARAM F/U: HCPCS | Performed by: INTERNAL MEDICINE

## 2023-12-07 PROCEDURE — 1090F PRES/ABSN URINE INCON ASSESS: CPT | Performed by: INTERNAL MEDICINE

## 2023-12-07 PROCEDURE — 99214 OFFICE O/P EST MOD 30 MIN: CPT | Performed by: INTERNAL MEDICINE

## 2023-12-07 PROCEDURE — G8400 PT W/DXA NO RESULTS DOC: HCPCS | Performed by: INTERNAL MEDICINE

## 2023-12-07 PROCEDURE — 3078F DIAST BP <80 MM HG: CPT | Performed by: INTERNAL MEDICINE

## 2023-12-07 PROCEDURE — 1123F ACP DISCUSS/DSCN MKR DOCD: CPT | Performed by: INTERNAL MEDICINE

## 2023-12-07 RX ORDER — FUROSEMIDE 20 MG/1
20 TABLET ORAL DAILY
COMMUNITY
Start: 2023-12-05

## 2023-12-07 ASSESSMENT — ENCOUNTER SYMPTOMS
ABDOMINAL PAIN: 0
SHORTNESS OF BREATH: 1
ORTHOPNEA: 0

## 2023-12-07 NOTE — PROGRESS NOTES
History, Past Surgical History, Family history, Social History, and Medications were all reviewed with the patient today and updated as necessary. Prior to Admission medications    Medication Sig Start Date End Date Taking? Authorizing Provider   furosemide (LASIX) 20 MG tablet Take 1 tablet by mouth daily 12/5/23  Yes Marleny Diamond MD   latanoprost (XALATAN) 0.005 % ophthalmic solution INSTILL 1 DROP IN [facility] EYE AT BEDTIME 8/11/23  Yes Marleny Diamond MD   fluorouracil (EFUDEX) 5 % cream APPLY TOPICALLY TO LOWER LEG TWICE DAILY 8/8/23  Yes Marleny Diamond MD   chlorthalidone (HYGROTON) 25 MG tablet Take 1 tablet by mouth daily 10/4/23  Yes Mariah Torres MD   apixaban (ELIQUIS) 5 MG TABS tablet Take 1 tablet by mouth 2 times daily 10/4/23  Yes Mariah Torres MD   metoprolol succinate (TOPROL XL) 50 MG extended release tablet Take 1 tablet by mouth daily  Patient taking differently: Take 1 tablet by mouth nightly 10/4/23  Yes Mariah Torres MD   losartan (COZAAR) 50 MG tablet Take 1 tablet by mouth daily 10/4/23  Yes Mariah Torres MD   isosorbide mononitrate (IMDUR) 30 MG extended release tablet Take 1 tablet by mouth daily 10/4/23  Yes Mariah Torres MD   nitroGLYCERIN (NITROSTAT) 0.4 MG SL tablet DISSOLVE 1 TABLET UNDER TONGUE EVERY 5 MINUTES AS NEEDED FOR CHEST PAIN 10/4/23  Yes Mariah Torres MD   methenamine (HIPREX) 1 g tablet TAKE 1 TABLET BY MOUTH TWICE DAILY WITH MEALS 3/30/23  Yes GREGORIA Vazquez - NP   dicyclomine (BENTYL) 10 MG capsule 1 cap po Q8H PRN Diarrhea and cramps.  Max daily amount 30 mg 8/11/22  Yes Donta Lemus MD   acetaminophen (TYLENOL) 650 MG extended release tablet Take 1 tablet by mouth every 6 hours as needed   Yes Automatic Reconciliation, Ar     Allergies   Allergen Reactions    Albuterol Shortness Of Breath     \"coughing spasms and difficulty breathing\"    Amoxicillin Anaphylaxis and Swelling

## 2023-12-26 PROCEDURE — 93296 REM INTERROG EVL PM/IDS: CPT | Performed by: INTERNAL MEDICINE

## 2023-12-26 PROCEDURE — 93294 REM INTERROG EVL PM/LDLS PM: CPT | Performed by: INTERNAL MEDICINE

## 2024-01-22 ENCOUNTER — TELEPHONE (OUTPATIENT)
Age: 84
End: 2024-01-22

## 2024-01-23 ENCOUNTER — OFFICE VISIT (OUTPATIENT)
Age: 84
End: 2024-01-23
Payer: MEDICARE

## 2024-01-23 VITALS
DIASTOLIC BLOOD PRESSURE: 100 MMHG | SYSTOLIC BLOOD PRESSURE: 148 MMHG | HEART RATE: 60 BPM | WEIGHT: 173.4 LBS | HEIGHT: 67 IN | BODY MASS INDEX: 27.21 KG/M2

## 2024-01-23 DIAGNOSIS — I50.33 ACUTE ON CHRONIC HEART FAILURE WITH PRESERVED EJECTION FRACTION (HCC): Primary | ICD-10-CM

## 2024-01-23 PROCEDURE — G8417 CALC BMI ABV UP PARAM F/U: HCPCS | Performed by: INTERNAL MEDICINE

## 2024-01-23 PROCEDURE — 1090F PRES/ABSN URINE INCON ASSESS: CPT | Performed by: INTERNAL MEDICINE

## 2024-01-23 PROCEDURE — G8400 PT W/DXA NO RESULTS DOC: HCPCS | Performed by: INTERNAL MEDICINE

## 2024-01-23 PROCEDURE — 1036F TOBACCO NON-USER: CPT | Performed by: INTERNAL MEDICINE

## 2024-01-23 PROCEDURE — 99214 OFFICE O/P EST MOD 30 MIN: CPT | Performed by: INTERNAL MEDICINE

## 2024-01-23 PROCEDURE — G8484 FLU IMMUNIZE NO ADMIN: HCPCS | Performed by: INTERNAL MEDICINE

## 2024-01-23 PROCEDURE — 3078F DIAST BP <80 MM HG: CPT | Performed by: INTERNAL MEDICINE

## 2024-01-23 PROCEDURE — 1123F ACP DISCUSS/DSCN MKR DOCD: CPT | Performed by: INTERNAL MEDICINE

## 2024-01-23 PROCEDURE — 3077F SYST BP >= 140 MM HG: CPT | Performed by: INTERNAL MEDICINE

## 2024-01-23 PROCEDURE — G8427 DOCREV CUR MEDS BY ELIG CLIN: HCPCS | Performed by: INTERNAL MEDICINE

## 2024-01-23 RX ORDER — SPIRONOLACTONE 25 MG/1
25 TABLET ORAL DAILY
Qty: 30 TABLET | Refills: 3 | Status: SHIPPED | OUTPATIENT
Start: 2024-01-23

## 2024-01-23 RX ORDER — FUROSEMIDE 40 MG/1
40 TABLET ORAL DAILY
Qty: 60 TABLET | Refills: 3 | Status: SHIPPED | OUTPATIENT
Start: 2024-01-23

## 2024-01-23 ASSESSMENT — ENCOUNTER SYMPTOMS
ORTHOPNEA: 0
SHORTNESS OF BREATH: 0
ABDOMINAL PAIN: 0

## 2024-01-23 NOTE — PROGRESS NOTES
Dronedarone Angioedema, Diarrhea and Itching     Severe diarrhea, lips felt scorched & gum swelling & generalized itching    Lactose Intolerance (Gi)     Omega-3 Fatty Acids Hives    Sulfa Antibiotics Other (See Comments)     Leg cramps    Sulfamethoxazole-Trimethoprim Other (See Comments)     Past Medical History:   Diagnosis Date    Acute renal failure (ARF) (HCC)     Arrhythmia     afib    Arthritis          Atrial fibrillation (HCC)     Chronic pain     arthritis in knees    Coronary artery disease of native artery of native heart with stable angina pectoris (HCC) 07/25/2013    prior thrombotic occlusion of the LAD 2001.  No obstructive disease at cath  2002 Aug 2013: Cath no obstructive CAD Apr 2016 - EF 60-65%. pseudonormal diastolic function, RVSP 43 Jun 2017 - Lexiscan MPI - normal EKG, perfusion and LVEF Oct 2021- Echo - EF 50-55%, mild to mod MR, bi atrial enlargement, mild  RVE, RVSP 44 Aug 2022 - normal perfusion, EF 66%    Dyslipidemia     GERD (gastroesophageal reflux disease)     controlled with med    Glaucoma     History of basal cell cancer     multiple    History of pancreatitis 1999    History of peptic ulcer 2002 and 2014    HTN (hypertension)     controlled with med    Hx of colonic polyps          Macular edema     Followed by Dr. Das, [facility]    Pacemaker 2021    Medtronic pacemaker; 11/15/22 last in-person device check- pacemaker dependent    PUD (peptic ulcer disease)     Sick sinus syndrome (HCC) 06/26/2017 Jun 2017 - 24 hour  Monitor - NSR, occasional PAC, 2-3 episodes brief atrial tachycardia, longest 14 beats, asymptomatic.  2 episodes AIVR during sleep, rate 64, longest 4 beats.  No diary entries Nov 2021- dual chamber Medtronic pacemaker    Staph infection 2018    s/p left great toenail removal     Past Surgical History:   Procedure Laterality Date    BLADDER SUSPENSION  2006    CARDIAC CATHETERIZATION  2001, 2002--2013    no intervention-normal    CARPAL TUNNEL RELEASE

## 2024-02-06 ENCOUNTER — TELEPHONE (OUTPATIENT)
Age: 84
End: 2024-02-06

## 2024-02-06 ENCOUNTER — PATIENT MESSAGE (OUTPATIENT)
Age: 84
End: 2024-02-06

## 2024-02-06 RX ORDER — METOPROLOL SUCCINATE 50 MG/1
50 TABLET, EXTENDED RELEASE ORAL DAILY
Qty: 90 TABLET | Refills: 3 | Status: SHIPPED | OUTPATIENT
Start: 2024-02-06

## 2024-02-06 RX ORDER — LOSARTAN POTASSIUM 50 MG/1
50 TABLET ORAL DAILY
Qty: 90 TABLET | Refills: 3 | Status: SHIPPED | OUTPATIENT
Start: 2024-02-06

## 2024-02-06 NOTE — TELEPHONE ENCOUNTER
Requested Prescriptions     Pending Prescriptions Disp Refills    losartan (COZAAR) 50 MG tablet 90 tablet 3     Sig: Take 1 tablet by mouth daily    metoprolol succinate (TOPROL XL) 50 MG extended release tablet 90 tablet 3     Sig: Take 1 tablet by mouth daily

## 2024-02-06 NOTE — TELEPHONE ENCOUNTER
----- Message from Cathie Lorenzo MA sent at 2024 10:16 AM EST -----  Regarding: FW: severe leg cramps  Contact: [phone]    ----- Message -----  From: Destiney Saldaña  Sent: 2024  10:12 AM EST  To: [facility] Cardiology Clinical Staff  Subject: severe leg cramps                                taking spironolactone 25mgm & lasix 40 daily. you saw me , wt 173.6.  SOB  today wt is 163.8.  breathing much improved.  every 3rd day I have reduced my lasix  from 40mgm to 20 mgm due to occasional cramping in extremities.  last night the  cramps were severe in both legs & even my hands.  You said to watch my potassium.  I have not had blood chemistries done since 2022.  If you wish to order them I  would prefer to use [facility]  5 mins. from my home.  thank you.   destiney saldaña  1940     phone  [phone]

## 2024-02-06 NOTE — TELEPHONE ENCOUNTER
From: Destiney Brown  To: Dr. Bobby Light  Sent: 2024 10:22 AM EST  Subject: medication renewal    I need a renewal on medications: metoprolol ER succinate 50 mgm 1 daily and  renewal for losartan 50 mgm 1 daily   thank you   I use [facility] [address] [phone] thank you    Destiney Brown  1940

## 2024-02-07 DIAGNOSIS — I10 ESSENTIAL HYPERTENSION: ICD-10-CM

## 2024-02-07 DIAGNOSIS — E87.6 HYPOKALEMIA: ICD-10-CM

## 2024-02-07 DIAGNOSIS — I21.9 CORONARY ARTERY THROMBOSIS WITH MYOCARDIAL INFARCTION (HCC): ICD-10-CM

## 2024-02-07 DIAGNOSIS — I25.10 CORONARY ATHEROSCLEROSIS: ICD-10-CM

## 2024-02-07 DIAGNOSIS — I10 ESSENTIAL HYPERTENSION: Primary | ICD-10-CM

## 2024-02-07 LAB
ANION GAP SERPL CALC-SCNC: 3 MMOL/L (ref 2–11)
BUN SERPL-MCNC: 27 MG/DL (ref 8–23)
CALCIUM SERPL-MCNC: 9.5 MG/DL (ref 8.3–10.4)
CHLORIDE SERPL-SCNC: 107 MMOL/L (ref 103–113)
CO2 SERPL-SCNC: 28 MMOL/L (ref 21–32)
CREAT SERPL-MCNC: 0.7 MG/DL (ref 0.6–1)
GLUCOSE SERPL-MCNC: 113 MG/DL (ref 65–100)
POTASSIUM SERPL-SCNC: 4.5 MMOL/L (ref 3.5–5.1)
SODIUM SERPL-SCNC: 138 MMOL/L (ref 136–146)

## 2024-02-07 NOTE — TELEPHONE ENCOUNTER
Called pt this AM and informed her of Dr Light's response,  get a BMP.  Pt verb understanding and will have that drawn today per pt.  BMP order placed.

## 2024-02-20 ENCOUNTER — NURSE ONLY (OUTPATIENT)
Age: 84
End: 2024-02-20

## 2024-02-20 ENCOUNTER — OFFICE VISIT (OUTPATIENT)
Age: 84
End: 2024-02-20
Payer: MEDICARE

## 2024-02-20 VITALS
DIASTOLIC BLOOD PRESSURE: 68 MMHG | BODY MASS INDEX: 25.74 KG/M2 | SYSTOLIC BLOOD PRESSURE: 120 MMHG | WEIGHT: 164 LBS | HEART RATE: 72 BPM | HEIGHT: 67 IN

## 2024-02-20 DIAGNOSIS — R00.1 BRADYCARDIA: Primary | ICD-10-CM

## 2024-02-20 DIAGNOSIS — I87.331 CHRONIC VENOUS HTN W ULCER AND INFLAMMATION OF R LOW EXTREM (HCC): ICD-10-CM

## 2024-02-20 DIAGNOSIS — I44.2 CHB (COMPLETE HEART BLOCK) (HCC): ICD-10-CM

## 2024-02-20 DIAGNOSIS — I49.5 SICK SINUS SYNDROME (HCC): ICD-10-CM

## 2024-02-20 DIAGNOSIS — Z98.890 HX OF ATRIOVENTRICULAR NODE ABLATION: ICD-10-CM

## 2024-02-20 DIAGNOSIS — I21.9 CORONARY ARTERY THROMBOSIS WITH MYOCARDIAL INFARCTION (HCC): Primary | ICD-10-CM

## 2024-02-20 DIAGNOSIS — I48.91 ATRIAL FIBRILLATION (HCC): ICD-10-CM

## 2024-02-20 DIAGNOSIS — I25.118 CORONARY ARTERY DISEASE OF NATIVE ARTERY OF NATIVE HEART WITH STABLE ANGINA PECTORIS (HCC): ICD-10-CM

## 2024-02-20 LAB
BASOPHILS # BLD: 0.1 K/UL (ref 0–0.2)
BASOPHILS NFR BLD: 1 % (ref 0–2)
CHOLEST SERPL-MCNC: 152 MG/DL
DIFFERENTIAL METHOD BLD: NORMAL
EOSINOPHIL # BLD: 0.1 K/UL (ref 0–0.8)
EOSINOPHIL NFR BLD: 2 % (ref 0.5–7.8)
ERYTHROCYTE [DISTWIDTH] IN BLOOD BY AUTOMATED COUNT: 13.7 % (ref 11.9–14.6)
HCT VFR BLD AUTO: 42 % (ref 35.8–46.3)
HDLC SERPL-MCNC: 64 MG/DL (ref 40–60)
HDLC SERPL: 2.4
HGB BLD-MCNC: 13.5 G/DL (ref 11.7–15.4)
IMM GRANULOCYTES # BLD AUTO: 0 K/UL (ref 0–0.5)
IMM GRANULOCYTES NFR BLD AUTO: 0 % (ref 0–5)
LDLC SERPL CALC-MCNC: 75.4 MG/DL
LYMPHOCYTES # BLD: 1.2 K/UL (ref 0.5–4.6)
LYMPHOCYTES NFR BLD: 17 % (ref 13–44)
MCH RBC QN AUTO: 29.9 PG (ref 26.1–32.9)
MCHC RBC AUTO-ENTMCNC: 32.1 G/DL (ref 31.4–35)
MCV RBC AUTO: 92.9 FL (ref 82–102)
MONOCYTES # BLD: 0.6 K/UL (ref 0.1–1.3)
MONOCYTES NFR BLD: 9 % (ref 4–12)
NEUTS SEG # BLD: 5 K/UL (ref 1.7–8.2)
NEUTS SEG NFR BLD: 71 % (ref 43–78)
NRBC # BLD: 0 K/UL (ref 0–0.2)
PLATELET # BLD AUTO: 190 K/UL (ref 150–450)
PMV BLD AUTO: 10.8 FL (ref 9.4–12.3)
RBC # BLD AUTO: 4.52 M/UL (ref 4.05–5.2)
TRIGL SERPL-MCNC: 63 MG/DL (ref 35–150)
VLDLC SERPL CALC-MCNC: 12.6 MG/DL (ref 6–23)
WBC # BLD AUTO: 7 K/UL (ref 4.3–11.1)

## 2024-02-20 PROCEDURE — G8427 DOCREV CUR MEDS BY ELIG CLIN: HCPCS | Performed by: INTERNAL MEDICINE

## 2024-02-20 PROCEDURE — 1123F ACP DISCUSS/DSCN MKR DOCD: CPT | Performed by: INTERNAL MEDICINE

## 2024-02-20 PROCEDURE — 99214 OFFICE O/P EST MOD 30 MIN: CPT | Performed by: INTERNAL MEDICINE

## 2024-02-20 PROCEDURE — 3078F DIAST BP <80 MM HG: CPT | Performed by: INTERNAL MEDICINE

## 2024-02-20 PROCEDURE — 1090F PRES/ABSN URINE INCON ASSESS: CPT | Performed by: INTERNAL MEDICINE

## 2024-02-20 PROCEDURE — 1036F TOBACCO NON-USER: CPT | Performed by: INTERNAL MEDICINE

## 2024-02-20 PROCEDURE — 3074F SYST BP LT 130 MM HG: CPT | Performed by: INTERNAL MEDICINE

## 2024-02-20 PROCEDURE — G8417 CALC BMI ABV UP PARAM F/U: HCPCS | Performed by: INTERNAL MEDICINE

## 2024-02-20 PROCEDURE — G8484 FLU IMMUNIZE NO ADMIN: HCPCS | Performed by: INTERNAL MEDICINE

## 2024-02-20 PROCEDURE — G8400 PT W/DXA NO RESULTS DOC: HCPCS | Performed by: INTERNAL MEDICINE

## 2024-02-20 ASSESSMENT — ENCOUNTER SYMPTOMS
SHORTNESS OF BREATH: 0
ORTHOPNEA: 0
ABDOMINAL PAIN: 0

## 2024-02-20 NOTE — PROGRESS NOTES
[facility] CARDIOLOGY  [address] [place] [address], [address]  [place] [address]  PHONE: [phone]      24    NAME:  Destiney Brown  : 1940  MRN: [id number]         SUBJECTIVE:   Destiney Brown is a 83 y.o. female seen for a follow up visit regarding the following:     Chief Complaint   Patient presents with    Device Check    Congestive Heart Failure            HPI:  Follow up  Device Check and Congestive Heart Failure   .    Ms. Brown presents today for follow-up.  She is much improved from my last visit.  The addition of spironolactone has improved her blood pressure, she is also diuresed quite a bit.  Says she is now taking the 40 mg of Lasix twice a week and taking 20 mg every other day.  Shortness of breath much improved.  Her edema is much improved.  Her lab work after her last visit showed normal creatinine and normal electrolytes.    Congestive Heart Failure  Pertinent negatives include no abdominal pain, chest pain, claudication, near-syncope, palpitations or shortness of breath.           Key CAD CHF Meds            losartan (COZAAR) 50 MG tablet (Taking)    Sig - Route: Take 1 tablet by mouth daily - Oral    metoprolol succinate (TOPROL XL) 50 MG extended release tablet (Taking)    Sig - Route: Take 1 tablet by mouth daily - Oral    furosemide (LASIX) 40 MG tablet (Taking)    Sig - Route: Take 1 tablet by mouth daily - Oral    spironolactone (ALDACTONE) 25 MG tablet (Taking)    Sig - Route: Take 1 tablet by mouth daily - Oral    apixaban (ELIQUIS) 5 MG TABS tablet (Taking)    Sig - Route: Take 1 tablet by mouth 2 times daily - Oral    Notes to Pharmacy: FAXED TO [phone]          Key Antihyperglycemic Medications       Patient is on no antihyperglycemic meds.                Past Medical History, Past Surgical History, Family history, Social History, and Medications were all reviewed with the patient today and updated as necessary.     Prior to Admission medications    Medication

## 2024-04-22 ENCOUNTER — PATIENT MESSAGE (OUTPATIENT)
Dept: UROLOGY | Age: 84
End: 2024-04-22

## 2024-04-22 DIAGNOSIS — N39.0 RECURRENT UTI: ICD-10-CM

## 2024-04-22 RX ORDER — METHENAMINE HIPPURATE 1000 MG/1
1 TABLET ORAL 2 TIMES DAILY WITH MEALS
Qty: 180 TABLET | Refills: 3 | Status: SHIPPED | OUTPATIENT
Start: 2024-04-22

## 2024-05-21 ENCOUNTER — PATIENT MESSAGE (OUTPATIENT)
Age: 84
End: 2024-05-21

## 2024-05-21 DIAGNOSIS — I50.33 ACUTE ON CHRONIC HEART FAILURE WITH PRESERVED EJECTION FRACTION (HCC): ICD-10-CM

## 2024-05-21 RX ORDER — SPIRONOLACTONE 25 MG/1
25 TABLET ORAL DAILY
Qty: 90 TABLET | Refills: 3 | Status: SHIPPED | OUTPATIENT
Start: 2024-05-21

## 2024-05-23 ENCOUNTER — PATIENT MESSAGE (OUTPATIENT)
Dept: FAMILY MEDICINE CLINIC | Facility: CLINIC | Age: 84
End: 2024-05-23

## 2024-05-24 RX ORDER — PREDNISONE 10 MG/1
TABLET ORAL
Qty: 1 EACH | Refills: 0 | Status: SHIPPED | OUTPATIENT
Start: 2024-05-24

## 2024-05-24 RX ORDER — BROMPHENIRAMINE MALEATE, PSEUDOEPHEDRINE HYDROCHLORIDE, AND DEXTROMETHORPHAN HYDROBROMIDE 2; 30; 10 MG/5ML; MG/5ML; MG/5ML
5 SYRUP ORAL 4 TIMES DAILY PRN
Qty: 180 ML | Refills: 0 | Status: SHIPPED | OUTPATIENT
Start: 2024-05-24

## 2024-05-24 NOTE — TELEPHONE ENCOUNTER
From: Destiney ADAMS Solder  To: Dr. Bobby Manuel  Sent: 2024 2:19 PM EDT  Subject: flu/covid test    Dr. Manuel, I had the test done today and am positive for Covid.  I have been ill for 8-10 days. Still feel \"unwell\" and remain tired. No temp. My cough  persists, expectorating colored sputum. What do I do now? Am I contagious.? Do I stay  isolated? Have been during this illness anyway. Do I need an antiviral?   Destiney Brown  1940 [facility] on [facility]

## 2024-05-31 ENCOUNTER — NURSE ONLY (OUTPATIENT)
Dept: FAMILY MEDICINE CLINIC | Facility: CLINIC | Age: 84
End: 2024-05-31
Payer: MEDICARE

## 2024-05-31 DIAGNOSIS — Z20.822 EXPOSURE TO COVID-19 VIRUS: Primary | ICD-10-CM

## 2024-05-31 LAB
EXP DATE SOLUTION: ABNORMAL
EXP DATE SWAB: ABNORMAL
EXPIRATION DATE: ABNORMAL
LOT NUMBER POC: ABNORMAL
LOT NUMBER SOLUTION: ABNORMAL
LOT NUMBER SWAB: ABNORMAL
SARS-COV-2 RNA, POC: POSITIVE

## 2024-05-31 PROCEDURE — 87635 SARS-COV-2 COVID-19 AMP PRB: CPT | Performed by: FAMILY MEDICINE

## 2024-05-31 NOTE — TELEPHONE ENCOUNTER
Patient showed to office with  requesting COVID testing. She tested positive on 5/24 and spouse confirmed positive in office today.    Nurse swab patient for COVID per provider. Patient asked for office to call with results and treatment.    Results are positive.    Pharmacy on file.

## 2024-06-26 ENCOUNTER — OFFICE VISIT (OUTPATIENT)
Dept: UROLOGY | Age: 84
End: 2024-06-26
Payer: MEDICARE

## 2024-06-26 DIAGNOSIS — N39.0 RECURRENT UTI: Primary | ICD-10-CM

## 2024-06-26 DIAGNOSIS — R39.15 URINARY URGENCY: ICD-10-CM

## 2024-06-26 PROCEDURE — 99214 OFFICE O/P EST MOD 30 MIN: CPT | Performed by: NURSE PRACTITIONER

## 2024-06-26 PROCEDURE — 81003 URINALYSIS AUTO W/O SCOPE: CPT | Performed by: NURSE PRACTITIONER

## 2024-06-26 PROCEDURE — G8428 CUR MEDS NOT DOCUMENT: HCPCS | Performed by: NURSE PRACTITIONER

## 2024-06-26 PROCEDURE — 1036F TOBACCO NON-USER: CPT | Performed by: NURSE PRACTITIONER

## 2024-06-26 PROCEDURE — 1123F ACP DISCUSS/DSCN MKR DOCD: CPT | Performed by: NURSE PRACTITIONER

## 2024-06-26 PROCEDURE — G8400 PT W/DXA NO RESULTS DOC: HCPCS | Performed by: NURSE PRACTITIONER

## 2024-06-26 PROCEDURE — G8417 CALC BMI ABV UP PARAM F/U: HCPCS | Performed by: NURSE PRACTITIONER

## 2024-06-26 PROCEDURE — 1090F PRES/ABSN URINE INCON ASSESS: CPT | Performed by: NURSE PRACTITIONER

## 2024-06-26 RX ORDER — METHENAMINE HIPPURATE 1000 MG/1
1 TABLET ORAL 2 TIMES DAILY WITH MEALS
Qty: 180 TABLET | Refills: 3 | Status: SHIPPED | OUTPATIENT
Start: 2024-06-26

## 2024-06-26 RX ORDER — MIRABEGRON 50 MG/1
50 TABLET, EXTENDED RELEASE ORAL DAILY
Qty: 90 TABLET | Refills: 3 | Status: SHIPPED | OUTPATIENT
Start: 2024-06-26

## 2024-06-26 ASSESSMENT — ENCOUNTER SYMPTOMS
BACK PAIN: 0
NAUSEA: 0

## 2024-06-26 NOTE — PROGRESS NOTES
[facility] UROLOGY  [address] [place] [address]  [phone]          Destiney Brown  : 1940    Chief Complaint   Patient presents with    Follow-up          HPI     Destiney Brown is a 84 y.o. female  followed for recurrent UTI and OAB. She does have significant history of a sling procedure due to stress urinary incontinence. She denies any stress incontinence.     For OAB, she could not take Myrbetriq d/t cost. Was on oxybutynin xl 10 mg BID. She continued to have moderate urge incontinence, especially when standing. She was changed to Vesicare. She is now on Vesicare 10 mg daily. She reports mild relief with this.  Felt like oxybutynin did better.  Recently however she has felt that the oxybutynin has caused a fuzzy vision.  She does have glaucoma and thinks that this was affecting her vision.  That she has stopped it altogether.  She is doing Kegel exercises and is not interested in trying any other medicine at this time.       At this point however I want to document that she has failed oxybutynin and also Vesicare due to side effects.       Also here for recurrent UTI, she remains on cranberry tablets and hiprex daily. Today she reports no problems r/t UTI.      She is need of refills today.    Past Medical History:   Diagnosis Date    Acute renal failure (ARF) ([facility])     Arrhythmia     afib    Arthritis          Atrial fibrillation (HCC)     Chronic pain     arthritis in knees    Coronary artery disease of native artery of native heart with stable angina pectoris ([facility]) 2013    prior thrombotic occlusion of the LAD .  No obstructive disease at cath  2002 Aug 2013: Cath no obstructive CAD 2016 - EF 60-65%. pseudonormal diastolic function, RVSP  - Lexiscan MPI - normal EKG, perfusion and LVEF Oct 2021- Echo - EF 50-55%, mild to mod MR, bi atrial enlargement, mild  RVE, RVSP 44 Aug 2022 - normal perfusion, EF 66%    Dyslipidemia     GERD (gastroesophageal

## 2024-08-02 ENCOUNTER — TELEPHONE (OUTPATIENT)
Age: 84
End: 2024-08-02

## 2024-08-02 NOTE — TELEPHONE ENCOUNTER
Pt said last night her left arm is going numb on and off unsure what she should do. No other known symptoms last occurred last night nothing this morning she stated. Please advise thank you.

## 2024-08-02 NOTE — TELEPHONE ENCOUNTER
Pt.is calling reporting lately her left arm at end of the day has been going numb.She usually reads in the evenings.Last night just watching TV her arm went numb and she dropped what she was holding.After a minute or two of two of rubbing her arm it goes away.She had carpal tunnel in that same arm last Feb.She denies any CP or SOB.Has been more tired lately but has CHF.I told her it would be atypical for her heart but I will ask the doctor and call her back.

## 2024-08-20 ENCOUNTER — OFFICE VISIT (OUTPATIENT)
Age: 84
End: 2024-08-20
Payer: MEDICARE

## 2024-08-20 VITALS
SYSTOLIC BLOOD PRESSURE: 102 MMHG | WEIGHT: 168 LBS | DIASTOLIC BLOOD PRESSURE: 68 MMHG | HEIGHT: 68 IN | HEART RATE: 70 BPM | BODY MASS INDEX: 25.46 KG/M2

## 2024-08-20 DIAGNOSIS — I50.33 ACUTE ON CHRONIC HEART FAILURE WITH PRESERVED EJECTION FRACTION (HCC): Primary | ICD-10-CM

## 2024-08-20 DIAGNOSIS — I48.0 PAROXYSMAL ATRIAL FIBRILLATION (HCC): ICD-10-CM

## 2024-08-20 DIAGNOSIS — R00.1 BRADYCARDIA: ICD-10-CM

## 2024-08-20 DIAGNOSIS — I50.33 ACUTE ON CHRONIC HEART FAILURE WITH PRESERVED EJECTION FRACTION (HCC): ICD-10-CM

## 2024-08-20 LAB
ALBUMIN SERPL-MCNC: 4 G/DL (ref 3.2–4.6)
ALBUMIN/GLOB SERPL: 1.1 (ref 1–1.9)
ALP SERPL-CCNC: 120 U/L (ref 35–104)
ALT SERPL-CCNC: 14 U/L (ref 12–65)
ANION GAP SERPL CALC-SCNC: 8 MMOL/L (ref 9–18)
AST SERPL-CCNC: 24 U/L (ref 15–37)
BILIRUB SERPL-MCNC: 0.9 MG/DL (ref 0–1.2)
BUN SERPL-MCNC: 23 MG/DL (ref 8–23)
CALCIUM SERPL-MCNC: 10.1 MG/DL (ref 8.8–10.2)
CHLORIDE SERPL-SCNC: 101 MMOL/L (ref 98–107)
CO2 SERPL-SCNC: 29 MMOL/L (ref 20–28)
CREAT SERPL-MCNC: 0.72 MG/DL (ref 0.6–1.1)
ERYTHROCYTE [DISTWIDTH] IN BLOOD BY AUTOMATED COUNT: 15.1 % (ref 11.9–14.6)
GLOBULIN SER CALC-MCNC: 3.7 G/DL (ref 2.3–3.5)
GLUCOSE SERPL-MCNC: 105 MG/DL (ref 70–99)
HCT VFR BLD AUTO: 43 % (ref 35.8–46.3)
HGB BLD-MCNC: 13.8 G/DL (ref 11.7–15.4)
MCH RBC QN AUTO: 31.1 PG (ref 26.1–32.9)
MCHC RBC AUTO-ENTMCNC: 32.1 G/DL (ref 31.4–35)
MCV RBC AUTO: 96.8 FL (ref 82–102)
NRBC # BLD: 0 K/UL (ref 0–0.2)
PLATELET # BLD AUTO: 218 K/UL (ref 150–450)
PMV BLD AUTO: 11.1 FL (ref 9.4–12.3)
POTASSIUM SERPL-SCNC: 5.3 MMOL/L (ref 3.5–5.1)
PROT SERPL-MCNC: 7.7 G/DL (ref 6.3–8.2)
RBC # BLD AUTO: 4.44 M/UL (ref 4.05–5.2)
SODIUM SERPL-SCNC: 138 MMOL/L (ref 136–145)
WBC # BLD AUTO: 6 K/UL (ref 4.3–11.1)

## 2024-08-20 PROCEDURE — 3078F DIAST BP <80 MM HG: CPT | Performed by: INTERNAL MEDICINE

## 2024-08-20 PROCEDURE — 1036F TOBACCO NON-USER: CPT | Performed by: INTERNAL MEDICINE

## 2024-08-20 PROCEDURE — 1090F PRES/ABSN URINE INCON ASSESS: CPT | Performed by: INTERNAL MEDICINE

## 2024-08-20 PROCEDURE — 3074F SYST BP LT 130 MM HG: CPT | Performed by: INTERNAL MEDICINE

## 2024-08-20 PROCEDURE — G8428 CUR MEDS NOT DOCUMENT: HCPCS | Performed by: INTERNAL MEDICINE

## 2024-08-20 PROCEDURE — G8417 CALC BMI ABV UP PARAM F/U: HCPCS | Performed by: INTERNAL MEDICINE

## 2024-08-20 PROCEDURE — G8400 PT W/DXA NO RESULTS DOC: HCPCS | Performed by: INTERNAL MEDICINE

## 2024-08-20 PROCEDURE — 1123F ACP DISCUSS/DSCN MKR DOCD: CPT | Performed by: INTERNAL MEDICINE

## 2024-08-20 PROCEDURE — 99214 OFFICE O/P EST MOD 30 MIN: CPT | Performed by: INTERNAL MEDICINE

## 2024-08-20 RX ORDER — TIMOLOL MALEATE 5 MG/ML
SOLUTION/ DROPS OPHTHALMIC
COMMUNITY
Start: 2024-07-24

## 2024-08-20 ASSESSMENT — ENCOUNTER SYMPTOMS
SHORTNESS OF BREATH: 0
ABDOMINAL PAIN: 0

## 2024-08-20 NOTE — PROGRESS NOTES
6  
2-3 episodes brief atrial tachycardia, longest 14 beats, asymptomatic.  2 episodes AIVR during sleep, rate 64, longest 4 beats.  No diary entries Nov 2021- dual chamber Medtronic pacemaker    Staph infection 2018    s/p left great toenail removal     Past Surgical History:   Procedure Laterality Date    BLADDER SURGERY      BLADDER SUSPENSION  2006    CARDIAC CATHETERIZATION  2001, 2002--2013    no intervention-normal    CARPAL TUNNEL RELEASE Right     CARPAL TUNNEL RELEASE Left 11/08/2023    LEFT ULTRASOUND GUIDED CARPAL TUNNEL RELEASE/ Medtronic pacemaker performed by Vani Perez MD at [facility] OPC    CATARACT REMOVAL Left 2012    with IOL    CATARACT REMOVAL Right 2007    with IOL    CHOLECYSTECTOMY, LAPAROSCOPIC  1999    and ERCP    COLONOSCOPY  09/23/2014    COLONOSCOPY      multiple    COLONOSCOPY N/A 02/23/2023    COLONOSCOPY WITH BIOPSY/ Polyps performed by Venkatesh Mak MD at [facility] ENDOSCOPY    EP DEVICE PROCEDURE N/A 10/11/2022    Insert PPM biv multi performed by Donald Pepper MD at [facility] CARDIAC CATH LAB    EP DEVICE PROCEDURE N/A 10/11/2022    ABLATION AV NODE performed by Donald Pepper MD at [facility] CARDIAC CATH LAB    HYSTERECTOMY (CERVIX STATUS UNKNOWN)  1990    HYSTERECTOMY, TOTAL ABDOMINAL (CERVIX REMOVED)      KNEE ARTHROSCOPY Left 1995    knee scope    KNEE ARTHROSCOPY Right 1996    knee scope    KNEE ARTHROSCOPY Right 2007    knee scope    LUMBAR LAMINECTOMY  1978 and 1979    back surg--x 2      PACEMAKER PLACEMENT  12/03/2021    PA UNLISTED PROCEDURE CARDIAC SURGERY  2021    cardioversion x2    TUBAL LIGATION  1997    UPPER GASTROINTESTINAL ENDOSCOPY      multiple    UPPER GASTROINTESTINAL ENDOSCOPY N/A 02/23/2023    EGD PYLORIC DILATION BALLOON performed by Venkatesh Mak MD at [facility] ENDOSCOPY     Family History   Problem Relation Age of Onset    Alzheimer's Disease Sister     Lung Disease Father     Colon Cancer Father     Cancer Father         colon    Crohn's Disease Mother     Delayed

## 2024-08-26 ENCOUNTER — TELEPHONE (OUTPATIENT)
Age: 84
End: 2024-08-26

## 2024-08-26 NOTE — TELEPHONE ENCOUNTER
----- Message from Dr. Bobby Light MD sent at 8/23/2024 11:13 AM EDT -----  Please let patient know that recent blood work showed a potassium that was slightly above normal but otherwise no significant findings.  Continue with the lower dose of Lasix. Patient can follow up with me as scheduled.

## 2024-11-19 RX ORDER — ISOSORBIDE MONONITRATE 30 MG/1
30 TABLET, EXTENDED RELEASE ORAL EVERY MORNING
Qty: 90 TABLET | Refills: 3 | Status: SHIPPED | OUTPATIENT
Start: 2024-11-19

## 2024-12-05 ENCOUNTER — OFFICE VISIT (OUTPATIENT)
Dept: ENT CLINIC | Age: 84
End: 2024-12-05
Payer: MEDICARE

## 2024-12-05 VITALS — HEIGHT: 67 IN | BODY MASS INDEX: 26.37 KG/M2 | RESPIRATION RATE: 16 BRPM | WEIGHT: 168 LBS

## 2024-12-05 DIAGNOSIS — R49.0 MUSCLE TENSION DYSPHONIA: ICD-10-CM

## 2024-12-05 DIAGNOSIS — J30.0 VASOMOTOR RHINITIS: ICD-10-CM

## 2024-12-05 DIAGNOSIS — R49.0 HOARSENESS: Primary | ICD-10-CM

## 2024-12-05 DIAGNOSIS — H61.23 EXCESSIVE CERUMEN IN EAR CANAL, BILATERAL: ICD-10-CM

## 2024-12-05 DIAGNOSIS — J38.7 PRESBYLARYNX: ICD-10-CM

## 2024-12-05 PROCEDURE — G8417 CALC BMI ABV UP PARAM F/U: HCPCS | Performed by: STUDENT IN AN ORGANIZED HEALTH CARE EDUCATION/TRAINING PROGRAM

## 2024-12-05 PROCEDURE — 1090F PRES/ABSN URINE INCON ASSESS: CPT | Performed by: STUDENT IN AN ORGANIZED HEALTH CARE EDUCATION/TRAINING PROGRAM

## 2024-12-05 PROCEDURE — 1159F MED LIST DOCD IN RCRD: CPT | Performed by: STUDENT IN AN ORGANIZED HEALTH CARE EDUCATION/TRAINING PROGRAM

## 2024-12-05 PROCEDURE — 1160F RVW MEDS BY RX/DR IN RCRD: CPT | Performed by: STUDENT IN AN ORGANIZED HEALTH CARE EDUCATION/TRAINING PROGRAM

## 2024-12-05 PROCEDURE — 99214 OFFICE O/P EST MOD 30 MIN: CPT | Performed by: STUDENT IN AN ORGANIZED HEALTH CARE EDUCATION/TRAINING PROGRAM

## 2024-12-05 PROCEDURE — 69210 REMOVE IMPACTED EAR WAX UNI: CPT | Performed by: STUDENT IN AN ORGANIZED HEALTH CARE EDUCATION/TRAINING PROGRAM

## 2024-12-05 PROCEDURE — G8484 FLU IMMUNIZE NO ADMIN: HCPCS | Performed by: STUDENT IN AN ORGANIZED HEALTH CARE EDUCATION/TRAINING PROGRAM

## 2024-12-05 PROCEDURE — 1036F TOBACCO NON-USER: CPT | Performed by: STUDENT IN AN ORGANIZED HEALTH CARE EDUCATION/TRAINING PROGRAM

## 2024-12-05 PROCEDURE — 1123F ACP DISCUSS/DSCN MKR DOCD: CPT | Performed by: STUDENT IN AN ORGANIZED HEALTH CARE EDUCATION/TRAINING PROGRAM

## 2024-12-05 PROCEDURE — G8427 DOCREV CUR MEDS BY ELIG CLIN: HCPCS | Performed by: STUDENT IN AN ORGANIZED HEALTH CARE EDUCATION/TRAINING PROGRAM

## 2024-12-05 PROCEDURE — G8400 PT W/DXA NO RESULTS DOC: HCPCS | Performed by: STUDENT IN AN ORGANIZED HEALTH CARE EDUCATION/TRAINING PROGRAM

## 2024-12-05 PROCEDURE — 31575 DIAGNOSTIC LARYNGOSCOPY: CPT | Performed by: STUDENT IN AN ORGANIZED HEALTH CARE EDUCATION/TRAINING PROGRAM

## 2024-12-05 PROCEDURE — 1126F AMNT PAIN NOTED NONE PRSNT: CPT | Performed by: STUDENT IN AN ORGANIZED HEALTH CARE EDUCATION/TRAINING PROGRAM

## 2024-12-05 RX ORDER — IPRATROPIUM BROMIDE 42 UG/1
2 SPRAY, METERED NASAL 3 TIMES DAILY PRN
Qty: 15 ML | Refills: 1 | Status: SHIPPED | OUTPATIENT
Start: 2024-12-05

## 2024-12-05 ASSESSMENT — ENCOUNTER SYMPTOMS
SHORTNESS OF BREATH: 0
CONSTIPATION: 0
SINUS PAIN: 0
FACIAL SWELLING: 0
SINUS PRESSURE: 0
EYE ITCHING: 0
VOICE CHANGE: 1
DIARRHEA: 0
NAUSEA: 0
EYE DISCHARGE: 0
APNEA: 0
WHEEZING: 0
COUGH: 0
EYE PAIN: 0
CHOKING: 0
STRIDOR: 0

## 2024-12-05 NOTE — PROGRESS NOTES
08/20/2024     Lab Results   Component Value Date/Time     08/20/2024 11:30 AM    K 5.3 08/20/2024 11:30 AM     08/20/2024 11:30 AM    CO2 29 08/20/2024 11:30 AM    BUN 23 08/20/2024 11:30 AM    CREATININE 0.72 08/20/2024 11:30 AM    GLUCOSE 105 08/20/2024 11:30 AM    CALCIUM 10.1 08/20/2024 11:30 AM    LABGLOM 82 08/20/2024 11:30 AM    LABGLOM >60 02/07/2024 01:33 PM      AMB POC COVID-19 COV  Order: [id number]  Status: Final result       Visible to patient: Yes (seen)       Next appt: 12/18/2024 at 02:00 PM in Orthopedic Surgery (Cameron Rashid MD)       Dx: Exposure to COVID-19 virus    0 Result Notes      Component 5/31/24 1237   SARS-COV-2 RNA, POC Positive          ASSESSMENT and PLAN  I we will see her back in 3 months with an audiogram.  I will have her see SLP.  I will give her Atrovent to use.  Can consider posterior nasal nerve ablation if rhinorrhea persists.  If hoarseness persists after seeing SLP can consider injection laryngoplasty.    ICD-10-CM    1. 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, [facility]      3. Muscle tension dysphonia  R49.0 [facility] - Speech Therapy, [facility]      4. Excessive cerumen in ear canal, bilateral  H61.23 REMOVE IMPACTED EAR WAX      5. Vasomotor rhinitis  J30.0 ipratropium (ATROVENT) 0.06 % nasal spray              Laron Bocanegra MD  12/5/2024  Electronically signed    Note dictated using voice recognition software.  Please excuse any typos.

## 2024-12-11 ENCOUNTER — HOSPITAL ENCOUNTER (OUTPATIENT)
Dept: PHYSICAL THERAPY | Age: 84
Setting detail: RECURRING SERIES
Discharge: HOME | End: 2024-12-14
Attending: STUDENT IN AN ORGANIZED HEALTH CARE EDUCATION/TRAINING PROGRAM

## 2024-12-11 DIAGNOSIS — R49.8 OTHER VOICE AND RESONANCE DISORDERS: ICD-10-CM

## 2024-12-11 DIAGNOSIS — R49.0 DYSPHONIA: Primary | ICD-10-CM

## 2024-12-11 NOTE — THERAPY EVALUATION
Communication Evaluation      Destiney Brown  : 1940  Primary: Medicare Part A And B  Secondary: [employer] MEDICARE SUPP [facility] @ [address]  [facility] [address]  Phone: [phone]  Fax: [phone]    Visit Info:    Effective Dates  2024 TO 25    Frequency/Duration    1 time(s) a week for 90 days   SPEECH LANGUAGE PATHOLOGY: COMMUNICATION    Initial Assessment 2024     Appt Desk   Episode  Charges Attendance Report   Events        Treatment Diagnosis:     Dysphonia  Other voice and resonance disorders  Medical/Referring Diagnosis:   Presbylarynx  Muscle tension dysphonia     Referring Physician:  Laron Bocanegra MD MD Orders:  Eval and Treat    Return MD Appt:   pt is to return in 3 months to ENT  Date of Onset:  2024  Allergies:  Albuterol, Amoxicillin, Guaifenesin, Iodine, Black walnut flavor, Ciprofloxacin, Dronedarone, Lactose intolerance (gi), Omega-3 fatty acids, Sulfa antibiotics, and Sulfamethoxazole-trimethoprim  Medications Last Reviewed:  2024     SUBJECTIVE    History of Injury/Illness (Reason for Referral):  Patient with recent history of chronic hoarseness was referred by ENT who 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 ENT also reports patient has mild GERD.   Patient Stated Goal(s):  \"for family to be able to understand me by the end of the day when my voice is worse\"    Past Medical History/Comorbidities:   Ms. Brown  has a past medical history of Acute renal failure (ARF) (HCC), Arrhythmia, Arthritis, Atrial fibrillation (HCC), Chronic pain, Coronary artery disease of native artery of native heart with stable angina pectoris (HCC), Dyslipidemia, GERD (gastroesophageal reflux disease), Glaucoma, History of basal cell cancer, History of pancreatitis, History of peptic ulcer, HTN (hypertension), Hx of colonic polyps, Macular edema, Pacemaker, PUD (peptic ulcer disease), Sick sinus

## 2024-12-16 ENCOUNTER — HOSPITAL ENCOUNTER (OUTPATIENT)
Dept: PHYSICAL THERAPY | Age: 84
Setting detail: RECURRING SERIES
Discharge: HOME | End: 2024-12-19
Attending: STUDENT IN AN ORGANIZED HEALTH CARE EDUCATION/TRAINING PROGRAM
Payer: MEDICARE

## 2024-12-16 DIAGNOSIS — R49.0 DYSPHONIA: Primary | ICD-10-CM

## 2024-12-16 DIAGNOSIS — R49.8 OTHER VOICE AND RESONANCE DISORDERS: ICD-10-CM

## 2024-12-16 PROBLEM — R47.1 DYSARTHRIA AND ANARTHRIA: Status: ACTIVE | Noted: 2024-12-16

## 2024-12-16 PROCEDURE — 92507 TX SP LANG VOICE COMM INDIV: CPT

## 2024-12-16 NOTE — PROGRESS NOTES
Communication Treatment     Destiney Brown  : 1940  Primary: Medicare Part A And B  Secondary: [employer] HE[employer] C[employer] MEDICARE SUPP [facility] @ [address]  [facility] [address]  Phone: [phone]  Fax: [phone]    Effective Dates:     24 TO 3/11/25      Frequency/Duration:     for 1 time a week for 90 days    SLP Visit Info:  Total # of Visits to Date: 2        OUTPATIENT SPEECH PATHOLOGY NOTE:Daily Note 2024  Appt Desk   Episode  Charges Attendance Report   Events        Treatment Diagnosis:     Dysphonia  Other voice and resonance disorders  Medical/Referring Diagnosis:    Presbylarynx  Muscle tension dysphonia     Referring Physician:  Laron Bocanegra MD MD Orders:  Eval and Treat    Allergies:  Albuterol, Amoxicillin, Guaifenesin, Iodine, Black walnut flavor, Ciprofloxacin, Dronedarone, Lactose intolerance (gi), Omega-3 fatty acids, Sulfa antibiotics, and Sulfamethoxazole-trimethoprim  Medications Last Reviewed:  2024  Subjective Comments:  Patient was seen in the speech office. Pleasant and cooperative.   Pain: Patient does not c/o pain  Interventions Planned: (Treatment may consist of any combination of the following)        See Assessment Note  GOALS:  1. Patient will demonstrate implementation of vocal hygiene techniques including improved hydration, reduced habitual coughing, throat clearing, and adherence to reflux precautions and medications regiment, reduction of vocally abusive behaviors with 90% adherence on a daily basis to reduce laryngeal inflammation.  Patient will complete laryngeal massage and voice relaxation techniques on a daily basis with 80% accuracy.  Patient will complete abdominal breathing, respiratory retraining exercises such as closed mouth breathing, sniff/blow, etc with 90% accuracy and mod I cues.   Patient will use forward focus of voice energy with 90% accuracy in words/phrases/sentences/conversation

## 2024-12-17 ENCOUNTER — TELEPHONE (OUTPATIENT)
Dept: ENT CLINIC | Age: 84
End: 2024-12-17

## 2024-12-17 DIAGNOSIS — R13.14 PHARYNGOESOPHAGEAL DYSPHAGIA: Primary | ICD-10-CM

## 2024-12-18 ENCOUNTER — OFFICE VISIT (OUTPATIENT)
Dept: ORTHOPEDIC SURGERY | Age: 84
End: 2024-12-18

## 2024-12-18 DIAGNOSIS — M25.562 LEFT KNEE PAIN, UNSPECIFIED CHRONICITY: Primary | ICD-10-CM

## 2024-12-18 DIAGNOSIS — M17.11 UNILATERAL PRIMARY OSTEOARTHRITIS, RIGHT KNEE: ICD-10-CM

## 2024-12-18 DIAGNOSIS — M25.561 RIGHT KNEE PAIN, UNSPECIFIED CHRONICITY: ICD-10-CM

## 2024-12-18 DIAGNOSIS — M17.12 UNILATERAL PRIMARY OSTEOARTHRITIS, LEFT KNEE: ICD-10-CM

## 2024-12-18 DIAGNOSIS — M25.561 RIGHT KNEE PAIN, UNSPECIFIED CHRONICITY: Primary | ICD-10-CM

## 2024-12-18 RX ORDER — HYALURONATE SODIUM 10 MG/ML
20 SYRINGE (ML) INTRAARTICULAR ONCE
Status: COMPLETED | OUTPATIENT
Start: 2024-12-18 | End: 2024-12-18

## 2024-12-18 RX ADMIN — Medication 20 MG: at 14:27

## 2024-12-18 NOTE — PROGRESS NOTES
Housing Stability Vital Sign     Unable to Pay for Housing in the Last Year: Not on file     Number of Places Lived in the Last Year: Not on file     Unstable Housing in the Last Year: No       Review of Systems:  As per HPI.  Pertinent positives and negatives are addressed with the patient, particularly those related to musculoskeletal concerns.   Non-orthopaedic concerns were referred back to the primary care physician.    PHYSICAL EXAMINATION:   The patient appears their stated age and they are in no distress.  The lower extremities are as described below.  Circulation is normal with palpable pedal pulses bilaterally and no edema.  There is no lymph adenopathy in the popliteal or malleolar region.  The skin is without stasis disease distally bilaterally.  Hip ROM is smooth and painless.  Knee range of motion is  degrees on the right and 5-110 degrees on the left.  bilateral knee: There is 2mm of anterior/posterior translation and 2mm of medial/lateral instability bilaterally.  There is 6 and 3 degrees of varus alignment in the bilateral knee.  Left and right knee respectively  There is some pain to palpation over the medial joint line.  Limb lengths are equal.  The gait is noted to be with a slight trendelenburg and antalgia.  Straight leg test is negative.  Quadriceps strength is good.  Sensation is intact to light touch bilaterally.  Their judgment and insight are normal.  They are oriented to time, place and person.  Their memory is good and the mood and affect appropriate.    X-RAY: Views of the bilateral knee are reviewed.  4 views standing reveal joint space loss, eburnated bone, and osteophyte formation present.  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  Stancher chondrocalcinosis noted    IMPRESSION:    Diagnosis Orders   1. Right knee pain, unspecified chronicity  XR KNEE RIGHT (MIN 4 VIEWS)      .    RECOMMENDATIONS:    Reviewed x-ray findings with the

## 2024-12-20 ENCOUNTER — OFFICE VISIT (OUTPATIENT)
Age: 84
End: 2024-12-20
Payer: MEDICARE

## 2024-12-20 VITALS
WEIGHT: 172 LBS | HEIGHT: 67 IN | BODY MASS INDEX: 27 KG/M2 | HEART RATE: 71 BPM | SYSTOLIC BLOOD PRESSURE: 134 MMHG | DIASTOLIC BLOOD PRESSURE: 78 MMHG

## 2024-12-20 DIAGNOSIS — I48.0 PAROXYSMAL ATRIAL FIBRILLATION (HCC): ICD-10-CM

## 2024-12-20 DIAGNOSIS — I50.33 ACUTE ON CHRONIC HEART FAILURE WITH PRESERVED EJECTION FRACTION (HCC): Primary | ICD-10-CM

## 2024-12-20 DIAGNOSIS — I50.33 ACUTE ON CHRONIC HEART FAILURE WITH PRESERVED EJECTION FRACTION (HCC): ICD-10-CM

## 2024-12-20 DIAGNOSIS — I10 ESSENTIAL HYPERTENSION: ICD-10-CM

## 2024-12-20 DIAGNOSIS — R00.1 BRADYCARDIA: ICD-10-CM

## 2024-12-20 LAB
ALBUMIN SERPL-MCNC: 3.6 G/DL (ref 3.2–4.6)
ALBUMIN/GLOB SERPL: 1 (ref 1–1.9)
ALP SERPL-CCNC: 108 U/L (ref 35–104)
ALT SERPL-CCNC: 16 U/L (ref 8–45)
ANION GAP SERPL CALC-SCNC: 10 MMOL/L (ref 7–16)
AST SERPL-CCNC: 27 U/L (ref 15–37)
BILIRUB SERPL-MCNC: 1.1 MG/DL (ref 0–1.2)
BUN SERPL-MCNC: 15 MG/DL (ref 8–23)
CALCIUM SERPL-MCNC: 9.4 MG/DL (ref 8.8–10.2)
CHLORIDE SERPL-SCNC: 105 MMOL/L (ref 98–107)
CO2 SERPL-SCNC: 26 MMOL/L (ref 20–29)
CREAT SERPL-MCNC: 0.65 MG/DL (ref 0.6–1.1)
ERYTHROCYTE [DISTWIDTH] IN BLOOD BY AUTOMATED COUNT: 14.6 % (ref 11.9–14.6)
GLOBULIN SER CALC-MCNC: 3.5 G/DL (ref 2.3–3.5)
GLUCOSE SERPL-MCNC: 97 MG/DL (ref 70–99)
HCT VFR BLD AUTO: 37 % (ref 35.8–46.3)
HGB BLD-MCNC: 11.7 G/DL (ref 11.7–15.4)
MCH RBC QN AUTO: 30.5 PG (ref 26.1–32.9)
MCHC RBC AUTO-ENTMCNC: 31.6 G/DL (ref 31.4–35)
MCV RBC AUTO: 96.4 FL (ref 82–102)
NRBC # BLD: 0 K/UL (ref 0–0.2)
PLATELET # BLD AUTO: 198 K/UL (ref 150–450)
PMV BLD AUTO: 10.7 FL (ref 9.4–12.3)
POTASSIUM SERPL-SCNC: 4.2 MMOL/L (ref 3.5–5.1)
PROT SERPL-MCNC: 7.1 G/DL (ref 6.3–8.2)
RBC # BLD AUTO: 3.84 M/UL (ref 4.05–5.2)
SODIUM SERPL-SCNC: 141 MMOL/L (ref 136–145)
WBC # BLD AUTO: 6.7 K/UL (ref 4.3–11.1)

## 2024-12-20 PROCEDURE — 1090F PRES/ABSN URINE INCON ASSESS: CPT | Performed by: INTERNAL MEDICINE

## 2024-12-20 PROCEDURE — 1126F AMNT PAIN NOTED NONE PRSNT: CPT | Performed by: INTERNAL MEDICINE

## 2024-12-20 PROCEDURE — 3078F DIAST BP <80 MM HG: CPT | Performed by: INTERNAL MEDICINE

## 2024-12-20 PROCEDURE — G8400 PT W/DXA NO RESULTS DOC: HCPCS | Performed by: INTERNAL MEDICINE

## 2024-12-20 PROCEDURE — G8484 FLU IMMUNIZE NO ADMIN: HCPCS | Performed by: INTERNAL MEDICINE

## 2024-12-20 PROCEDURE — 1036F TOBACCO NON-USER: CPT | Performed by: INTERNAL MEDICINE

## 2024-12-20 PROCEDURE — G8417 CALC BMI ABV UP PARAM F/U: HCPCS | Performed by: INTERNAL MEDICINE

## 2024-12-20 PROCEDURE — G8428 CUR MEDS NOT DOCUMENT: HCPCS | Performed by: INTERNAL MEDICINE

## 2024-12-20 PROCEDURE — 93000 ELECTROCARDIOGRAM COMPLETE: CPT | Performed by: INTERNAL MEDICINE

## 2024-12-20 PROCEDURE — 3075F SYST BP GE 130 - 139MM HG: CPT | Performed by: INTERNAL MEDICINE

## 2024-12-20 PROCEDURE — 99214 OFFICE O/P EST MOD 30 MIN: CPT | Performed by: INTERNAL MEDICINE

## 2024-12-20 PROCEDURE — 1123F ACP DISCUSS/DSCN MKR DOCD: CPT | Performed by: INTERNAL MEDICINE

## 2024-12-20 RX ORDER — MULTIVITAMIN WITH IRON
500 TABLET ORAL DAILY
COMMUNITY

## 2024-12-20 RX ORDER — POTASSIUM CHLORIDE 1500 MG/1
20 TABLET, EXTENDED RELEASE ORAL DAILY
Qty: 30 TABLET | Refills: 5 | Status: SHIPPED | OUTPATIENT
Start: 2024-12-20

## 2024-12-20 ASSESSMENT — ENCOUNTER SYMPTOMS
SHORTNESS OF BREATH: 0
ABDOMINAL PAIN: 0

## 2024-12-20 NOTE — PROGRESS NOTES
[facility] CARDIOLOGY  [address] [place] [address], [address]  [place] [address]  PHONE: [phone]      24    NAME:  Destiney Brown  : 1940  MRN: [id number]         SUBJECTIVE:   Destiney Brown is a 84 y.o. female seen for a follow up visit regarding the following:     Chief Complaint   Patient presents with    Hypertension    Atrial Fibrillation    Acute on chronic heart failure with preserved ejection frac    Dizziness     For a week            HPI:  Follow up  Hypertension, Atrial Fibrillation, Acute on chronic heart failure with preserved ejection frac, and Dizziness (For a week)   .    Ms. Brown presents today for follow-up.  Patient complains of some increasing lower extremity swelling, weight gain and shortness of breath over the last couple of weeks.  Has been taking Lasix 20 mg daily without much increase in urine output.  Her weight is up about 8 pounds from her last visit with her.  Blood pressures been well-controlled, has been getting breathless with minimal exertion.    Hypertension  Pertinent negatives include no shortness of breath.   Atrial Fibrillation  Symptoms include dizziness. Symptoms are negative for shortness of breath.   Dizziness  Associated symptoms: no hearing loss and no shortness of breath            Cardiac Medications       Potassium Sparing Diuretics       spironolactone (ALDACTONE) 25 MG tablet Take 1 tablet by mouth daily       Nitrates       isosorbide mononitrate (IMDUR) 30 MG extended release tablet TAKE 1 TABLET BY MOUTH EVERY MORNING     nitroGLYCERIN (NITROSTAT) 0.4 MG SL tablet DISSOLVE 1 TABLET UNDER TONGUE EVERY 5 MINUTES AS NEEDED FOR CHEST PAIN       Beta Blockers Cardio-Selective       metoprolol succinate (TOPROL XL) 50 MG extended release tablet Take 1 tablet by mouth daily       Loop Diuretics       furosemide (LASIX) 40 MG tablet Take 1 tablet by mouth daily       Angiotensin II Receptor Antagonists       losartan (COZAAR) 50 MG tablet Take 1

## 2024-12-23 ENCOUNTER — HOSPITAL ENCOUNTER (OUTPATIENT)
Dept: PHYSICAL THERAPY | Age: 84
Setting detail: RECURRING SERIES
Discharge: HOME | End: 2024-12-26
Attending: STUDENT IN AN ORGANIZED HEALTH CARE EDUCATION/TRAINING PROGRAM
Payer: MEDICARE

## 2024-12-23 DIAGNOSIS — R49.0 DYSPHONIA: Primary | ICD-10-CM

## 2024-12-23 DIAGNOSIS — R49.8 OTHER VOICE AND RESONANCE DISORDERS: ICD-10-CM

## 2024-12-23 DIAGNOSIS — R13.14 DYSPHAGIA, PHARYNGOESOPHAGEAL PHASE: ICD-10-CM

## 2024-12-23 PROCEDURE — 92507 TX SP LANG VOICE COMM INDIV: CPT

## 2024-12-23 PROCEDURE — 92610 EVALUATE SWALLOWING FUNCTION: CPT

## 2024-12-23 NOTE — PROGRESS NOTES
Introduced straw phonation continuing to emphasize diaphragmatic breathing. Patient instructed to hum through the straw on the exhale varying the pitch for each rep. Patient completed apx. 10 voiced exhales through the straw up and down her pitch range with gentle voicing and no evidence of strain in neck and chin. Patient reports no pain or discomfort.     Pt completed pitch glides through the straw x5 with min-mod cues to keep voicing light and to maintain accurate breath support. Patient presented with written education on voice and straw phonation exercises.      Patient reports she has been humming Rony galdamez through her straw at home with good benefit to her voice.       Treatment/Session Summary:    Communication/Consultation:   Patient's MD has sent additional referral for swallow evaluation for today's session  Recommendations/Intent for next treatment session: Next visit will focus on Above breathing exercises, voice exercises, vocal hygiene review, review of swallow strategies, discuss breather device.    Total Duration:  Time In: 0945  Time Out: 1109  Minutes: 76    ARABELLA JOHANSEN, SLP

## 2024-12-23 NOTE — THERAPY EVALUATION
Dysphagia Evaluation      Destiney Brown  : 1940  Primary: Medicare Part A And B  Secondary: [employer] HE[employer] MEDICARE SUPP [facility] @ [address]  [facility] [address]  Phone: [phone]  Fax: [phone]    Visit Info:    Effective Date  2024 TO 3/11/25  Frequency/Duration  1 time(s) a week for 90 days.       SPEECH LANGUAGE PATHOLOGY: DYSPHAGIA    Initial Assessment 2024     Appt Desk   Episode  Charges Attendance Report   Events      Treatment Diagnosis:     Dysphonia  Other voice and resonance disorders  Dysphagia, pharyngoesophageal phase  Medical/Referring Diagnosis:   Presbylarynx  Muscle tension dysphonia   Pharyngoesophageal Dysphagia  Referring Physician:  Laron Bocanegra MD MD Orders:  Eval and Treat    Return MD Appt:  24  Date of Onset: 24  Allergies:  Albuterol, Amoxicillin, Guaifenesin, Iodine, Black walnut flavoring agent (non-screening), Ciprofloxacin, Dronedarone, Lactose intolerance (gi), Omega-3 fatty acids, Sulfa antibiotics, and Sulfamethoxazole-trimethoprim  Medications Last Reviewed:  2024    SUBJECTIVE    History of Injury/Illness (Reason for Referral):  Patient with recent history of chronic hoarseness was referred by ENT who 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 ENT also reports patient has mild GERD. Patient reports that recently she has begun experiencing problems swallowing pills and feels that they get stuck in her throat.   Current Dietary Status:   Regular Consistency  Thin Liquids    Dysphagia History: Patient reports history of having trouble swallowing pills recently.     Previous Instrumental Swallow Studies:  None reported or located in chart  Patient Stated Goal(s):  \"To be able to swallow as usual\"    Past Medical History/Comorbidities:   Ms. Brown  has a past medical history of Acute renal failure (ARF) (HCC), Arrhythmia, Arthritis, Atrial fibrillation (HCC), Chronic

## 2024-12-24 ENCOUNTER — TELEPHONE (OUTPATIENT)
Age: 84
End: 2024-12-24

## 2024-12-24 NOTE — TELEPHONE ENCOUNTER
----- Message from Dr. Bobby Light MD sent at 12/24/2024 10:44 AM EST -----  Please let patient know that recent blood work was normal. Patient can follow up with me as scheduled.     Spoke with pt informed her of Dr. Light response. Pt voiced understanding and wanted to let Dr. Light that she has being Hydrating and feeling much better.

## 2024-12-30 ENCOUNTER — HOSPITAL ENCOUNTER (OUTPATIENT)
Dept: PHYSICAL THERAPY | Age: 84
Setting detail: RECURRING SERIES
Discharge: HOME | End: 2025-01-02
Attending: STUDENT IN AN ORGANIZED HEALTH CARE EDUCATION/TRAINING PROGRAM
Payer: MEDICARE

## 2024-12-30 PROCEDURE — 92507 TX SP LANG VOICE COMM INDIV: CPT

## 2024-12-30 PROCEDURE — 92526 ORAL FUNCTION THERAPY: CPT

## 2024-12-30 NOTE — PROGRESS NOTES
exercises such as closed mouth breathing, sniff/blow, etc with 90% accuracy and mod I cues.  Met  Patient will use forward focus of voice energy with 90% accuracy in words/phrases/sentences/conversation to allow natural resonance of pitch without vocal strain or laryngeal tension. Goal not addressed due to short length of care  Patient will use diaphragmatic breath support in structured tasks to sentence level with 80% accuracy to provide power to voice without vocal strain or laryngeal tension. Met  Patient will consume regular texture and thin liquids without adverse pulmonary events to meet nutrition/hydration needs via PO modality.Met  Patient will utilize compensatory strategies (upright position during and after meals, alternating bites/sips with mod I cues to improve swallow safety. Met  Discharge Goals: Time Frame: 90 days  LTG: Patient will demonstrate improved vocal quality/loudness/intonation to communicate effectively across environments.  Discharge Goals: Time Frame: 90 days  Patient will maintain adequate hydration/nutrition with optimum safety and efficiency of swallowing function with PO intake without overt signs or symptoms of aspiration for the highest appropriate diet level.      Updated Objective Findings:   Patient reports she has had to significantly increase her Lasix which causes her mouth and throat to be more dry increasing her hoarseness. Would like to place therapy on hold for the time being due to worsening cardiac s/s.   Treatment   Completed diaphragmatic breathing in unstructured conversation with no observed tension in shoulders and neck. Patient reports she uses this breathing technique at all times now and is noticing improved posture and breathing. Patient feels her voice is worsened due to feeling so dry. Recommended patient drink water, use a humidifier, encouraged herbal teas and/or honey. Also encouraged patient to rest her voice when it feels painful or tired. Also,

## 2025-01-02 DIAGNOSIS — M17.12 UNILATERAL PRIMARY OSTEOARTHRITIS, LEFT KNEE: Primary | ICD-10-CM

## 2025-01-08 ENCOUNTER — OFFICE VISIT (OUTPATIENT)
Dept: ORTHOPEDIC SURGERY | Age: 85
End: 2025-01-08
Payer: MEDICARE

## 2025-01-08 DIAGNOSIS — M17.12 UNILATERAL PRIMARY OSTEOARTHRITIS, LEFT KNEE: ICD-10-CM

## 2025-01-08 DIAGNOSIS — M17.11 UNILATERAL PRIMARY OSTEOARTHRITIS, RIGHT KNEE: Primary | ICD-10-CM

## 2025-01-08 PROCEDURE — 20611 DRAIN/INJ JOINT/BURSA W/US: CPT | Performed by: ORTHOPAEDIC SURGERY

## 2025-01-08 RX ORDER — HYALURONATE SODIUM 10 MG/ML
20 SYRINGE (ML) INTRAARTICULAR ONCE
Status: COMPLETED | OUTPATIENT
Start: 2025-01-08 | End: 2025-01-08

## 2025-01-08 RX ADMIN — Medication 20 MG: at 10:08

## 2025-01-08 RX ADMIN — Medication 20 MG: at 10:09

## 2025-01-08 NOTE — PROGRESS NOTES
Name: Destiney ADAMS Solder  YOB: 1940  Gender: female  MRN: [id number]        CC: Bilateral Knee Pain     PROCEDURE:  2  of 3 bilateral knee HP    Diagnosis:   Encounter Diagnoses   Name Primary?    Unilateral primary osteoarthritis, right knee Yes    Unilateral primary osteoarthritis, left knee           Mindray US unit with variable frequency (6.0-15.0 MHz) linear transducer was used to visualize the intracondylar notch, retropatellar fat pad, patella tendon, patella, tibia, and to ensure proper intra-articular needle placement.  Injection image was saved in patient's permanent chart.    Procedure Note: Time out was performed which included identifying the patient by name and date of birth.  The procedure site was identified with all present in agreement.  The patient was placed in upright position with both knees hanging freely from exam table.  The knees were prepped in sterile fashion using alcohol wipe.  Using the [employer] ultrasound guidance, a 22 gauge needle was then introduced into both knee joints from an infrapatellar approach and 2 mL of Euflexxa was injected freely into each knee.  The needle was then removed, pressure hemostasis achieved, injection sites were cleansed with alcohol wipe, and dressed with band aid.    The patient tolerated the procedure without complication.  The patient will follow up as scheduled.

## 2025-01-15 ENCOUNTER — OFFICE VISIT (OUTPATIENT)
Dept: ORTHOPEDIC SURGERY | Age: 85
End: 2025-01-15
Payer: MEDICARE

## 2025-01-15 ENCOUNTER — TELEPHONE (OUTPATIENT)
Age: 85
End: 2025-01-15

## 2025-01-15 DIAGNOSIS — M17.11 UNILATERAL PRIMARY OSTEOARTHRITIS, RIGHT KNEE: Primary | ICD-10-CM

## 2025-01-15 DIAGNOSIS — M17.12 UNILATERAL PRIMARY OSTEOARTHRITIS, LEFT KNEE: ICD-10-CM

## 2025-01-15 PROCEDURE — 20611 DRAIN/INJ JOINT/BURSA W/US: CPT | Performed by: PHYSICIAN ASSISTANT

## 2025-01-15 RX ORDER — HYALURONATE SODIUM 10 MG/ML
20 SYRINGE (ML) INTRAARTICULAR ONCE
Status: COMPLETED | OUTPATIENT
Start: 2025-01-15 | End: 2025-01-15

## 2025-01-15 RX ADMIN — Medication 20 MG: at 09:30

## 2025-01-15 NOTE — PROGRESS NOTES
Name: Destiney ADAMS Solder  YOB: 1940  Gender: female  MRN: [id number]        CC: Bilateral Knee Pain     PROCEDURE:  3  of 3 bilateral knee HP    Diagnosis:   Encounter Diagnoses   Name Primary?    Unilateral primary osteoarthritis, right knee Yes    Unilateral primary osteoarthritis, left knee           Mindray US unit with variable frequency (6.0-15.0 MHz) linear transducer was used to visualize the intracondylar notch, retropatellar fat pad, patella tendon, patella, tibia, and to ensure proper intra-articular needle placement.  Injection image was saved in patient's permanent chart.    Procedure Note: Time out was performed which included identifying the patient by name and date of birth.  The procedure site was identified with all present in agreement.  The patient was placed in upright position with both knees hanging freely from exam table.  The knees were prepped in sterile fashion using alcohol wipe.  Using the [employer] ultrasound guidance, a 22 gauge needle was then introduced into both knee joints from an infrapatellar approach and 2 mL of Euflexxa was injected freely into each knee.  The needle was then removed, pressure hemostasis achieved, injection sites were cleansed with alcohol wipe, and dressed with band aid.    The patient tolerated the procedure without complication.  She reports substantial relief on the left side moderate relief of her right knee pain.  Still experiencing some giving way of her right knee at times.  She is not interested in TKA at her age and desires to continue with injection therapy.  She will return in 6 months to repeat bilateral knee HPE or sooner for cortisone, if needed.

## 2025-01-15 NOTE — TELEPHONE ENCOUNTER
Dr. Light, you asked me to let you know how I was doing since my last  appt. My complaints were extreme fatigue, severe muscle cramps after  taking my diuretic and possible slowing of my urinary output.  You put  me on Potassium meds, I decided to increase my fluid intake daily by  1-2qts of water daily and rest a little more.  My leg cramps are now minimal, my urinary output is almost equal to my intake.  Also my leg  edema is a little better and I am more compliant taking my lasic.  All in  all I feel better and suprised myself that I am here enjoying .  Laurence  brought me a recliner so I can elevate my legs more easily and that has  been great. I remain active and am looking forward to seeing you in March to .  Destiney Solddiomedes   1940.

## 2025-01-29 ENCOUNTER — PATIENT MESSAGE (OUTPATIENT)
Dept: UROLOGY | Age: 85
End: 2025-01-29

## 2025-01-29 DIAGNOSIS — R39.15 URINARY URGENCY: Primary | ICD-10-CM

## 2025-01-29 RX ORDER — SOLIFENACIN SUCCINATE 5 MG/1
5 TABLET, FILM COATED ORAL DAILY
Qty: 30 TABLET | Refills: 3 | Status: SHIPPED | OUTPATIENT
Start: 2025-01-29 | End: 2026-01-29

## 2025-01-29 RX ORDER — MIRABEGRON 50 MG/1
50 TABLET, FILM COATED, EXTENDED RELEASE ORAL DAILY
Qty: 90 TABLET | Refills: 3 | Status: SHIPPED | OUTPATIENT
Start: 2025-01-29

## 2025-02-10 ENCOUNTER — PATIENT MESSAGE (OUTPATIENT)
Dept: FAMILY MEDICINE CLINIC | Facility: CLINIC | Age: 85
End: 2025-02-10

## 2025-02-19 ENCOUNTER — PATIENT MESSAGE (OUTPATIENT)
Age: 85
End: 2025-02-19

## 2025-02-19 DIAGNOSIS — I50.33 ACUTE ON CHRONIC HEART FAILURE WITH PRESERVED EJECTION FRACTION (HCC): ICD-10-CM

## 2025-02-19 RX ORDER — SPIRONOLACTONE 25 MG/1
25 TABLET ORAL DAILY
Qty: 90 TABLET | Refills: 3 | Status: SHIPPED | OUTPATIENT
Start: 2025-02-19

## 2025-02-19 RX ORDER — POTASSIUM CHLORIDE 1500 MG/1
20 TABLET, EXTENDED RELEASE ORAL DAILY
Qty: 30 TABLET | Refills: 5 | Status: SHIPPED | OUTPATIENT
Start: 2025-02-19

## 2025-02-19 RX ORDER — METOPROLOL SUCCINATE 50 MG/1
50 TABLET, EXTENDED RELEASE ORAL DAILY
Qty: 90 TABLET | Refills: 3 | Status: SHIPPED | OUTPATIENT
Start: 2025-02-19

## 2025-02-19 RX ORDER — LOSARTAN POTASSIUM 50 MG/1
50 TABLET ORAL DAILY
Qty: 90 TABLET | Refills: 3 | Status: SHIPPED | OUTPATIENT
Start: 2025-02-19

## 2025-02-19 NOTE — TELEPHONE ENCOUNTER
Requested Prescriptions     Pending Prescriptions Disp Refills    losartan (COZAAR) 50 MG tablet 90 tablet 3     Sig: Take 1 tablet by mouth daily    metoprolol succinate (TOPROL XL) 50 MG extended release tablet 90 tablet 3     Sig: Take 1 tablet by mouth daily    potassium chloride (KLOR-CON M) 20 MEQ extended release tablet 30 tablet 5     Sig: Take 1 tablet by mouth daily    spironolactone (ALDACTONE) 25 MG tablet 90 tablet 3     Sig: Take 1 tablet by mouth daily        Verified rx. Last OV 12/20/24. Erx to pharm on file.

## 2025-02-25 ENCOUNTER — OFFICE VISIT (OUTPATIENT)
Age: 85
End: 2025-02-25
Payer: MEDICARE

## 2025-02-25 VITALS — WEIGHT: 171 LBS | HEIGHT: 68 IN | BODY MASS INDEX: 25.91 KG/M2

## 2025-02-25 DIAGNOSIS — M25.532 LEFT WRIST PAIN: Primary | ICD-10-CM

## 2025-02-25 DIAGNOSIS — M18.12 ARTHRITIS OF CARPOMETACARPAL (CMC) JOINT OF LEFT THUMB: ICD-10-CM

## 2025-02-25 DIAGNOSIS — M19.039 WRIST ARTHRITIS: ICD-10-CM

## 2025-02-25 DIAGNOSIS — M65.341 TRIGGER RING FINGER OF RIGHT HAND: ICD-10-CM

## 2025-02-25 DIAGNOSIS — M65.4 DE QUERVAIN'S TENOSYNOVITIS, LEFT: ICD-10-CM

## 2025-02-25 PROCEDURE — G8400 PT W/DXA NO RESULTS DOC: HCPCS | Performed by: ORTHOPAEDIC SURGERY

## 2025-02-25 PROCEDURE — 99214 OFFICE O/P EST MOD 30 MIN: CPT | Performed by: ORTHOPAEDIC SURGERY

## 2025-02-25 PROCEDURE — G8417 CALC BMI ABV UP PARAM F/U: HCPCS | Performed by: ORTHOPAEDIC SURGERY

## 2025-02-25 PROCEDURE — 1123F ACP DISCUSS/DSCN MKR DOCD: CPT | Performed by: ORTHOPAEDIC SURGERY

## 2025-02-25 PROCEDURE — 1036F TOBACCO NON-USER: CPT | Performed by: ORTHOPAEDIC SURGERY

## 2025-02-25 PROCEDURE — G8428 CUR MEDS NOT DOCUMENT: HCPCS | Performed by: ORTHOPAEDIC SURGERY

## 2025-02-25 PROCEDURE — 1090F PRES/ABSN URINE INCON ASSESS: CPT | Performed by: ORTHOPAEDIC SURGERY

## 2025-02-25 RX ORDER — METHYLPREDNISOLONE 4 MG/1
TABLET ORAL
Qty: 1 KIT | Refills: 0 | Status: SHIPPED | OUTPATIENT
Start: 2025-02-25

## 2025-02-25 NOTE — PROGRESS NOTES
Orthopaedic Hand Surgery Note    Name: Destiney ADAMS Solder  YOB: 1940  Gender: female  MRN: [id number]    CC: New patient referred for hand pain      HPI: Patient is a 84 y.o. female with a chief complaint of left thumb pain and weakness as well as wrist swelling. Swelling started 1 month ago, and has come down somewhat. The patient complains of increased pain with activities involving pinch and  (ex/ opening jars, turning car key, buttons).  She also complains of right ring finger locking      ROS/Meds/PSH/PMH/FH/SH: I personally reviewed the patients standard intake form.  Pertinents are discussed in the HPI    Physical Examination:  Musculoskeletal:   Examination of the left upper extremity demonstrates normal sensation in median, ulnar and radial distribution, cap refill in all fingers < 5 seconds, negative carpal tunnel compression and Phalen test. There is boggy soft tissue swelling over the dorsal radial aspect of the wrist. There is pain with wrist motion.  Mild prominence and instability of the base of first metacarpal. CMC grind is positive for pain and crepitus. Thumb MCP joint hyperextends 20 degrees. There is pain at the radial styloid and positive finkelstein's test.   There is tenderness to palpation and positive locking at the right ring A1 pulley; there is a 40 degree flexion contracture at the PIP.    Imaging / Electrodiagnostic Tests:     Hand XR: AP, Lateral, Oblique and Thumb CMC joint     Clinical Indication:  1. Left wrist pain    2. Arthritis of carpometacarpal (CMC) joint of left thumb    3. Wrist arthritis    4. Trigger ring finger of right hand    5. De Quervain's tenosynovitis, left           Report: AP, lateral, oblique and thumb CMC joint x-ray of the left hand demonstrates moderate joint space narrowing, subluxation and osteophytic changes of the trapezium consistent with osteoarthritis of the thumb CMC joint.   There are periarticular calcifications and calcification

## 2025-03-19 ENCOUNTER — OFFICE VISIT (OUTPATIENT)
Age: 85
End: 2025-03-19
Payer: MEDICARE

## 2025-03-19 ENCOUNTER — CLINICAL SUPPORT (OUTPATIENT)
Age: 85
End: 2025-03-19

## 2025-03-19 VITALS
HEART RATE: 74 BPM | DIASTOLIC BLOOD PRESSURE: 68 MMHG | HEIGHT: 68 IN | WEIGHT: 170 LBS | BODY MASS INDEX: 25.76 KG/M2 | SYSTOLIC BLOOD PRESSURE: 122 MMHG

## 2025-03-19 DIAGNOSIS — I10 ESSENTIAL HYPERTENSION: ICD-10-CM

## 2025-03-19 DIAGNOSIS — I48.0 PAROXYSMAL ATRIAL FIBRILLATION (HCC): ICD-10-CM

## 2025-03-19 DIAGNOSIS — I49.5 SICK SINUS SYNDROME (HCC): ICD-10-CM

## 2025-03-19 DIAGNOSIS — I50.33 ACUTE ON CHRONIC HEART FAILURE WITH PRESERVED EJECTION FRACTION (HCC): Primary | ICD-10-CM

## 2025-03-19 DIAGNOSIS — R00.1 BRADYCARDIA: Primary | ICD-10-CM

## 2025-03-19 PROCEDURE — 1090F PRES/ABSN URINE INCON ASSESS: CPT | Performed by: INTERNAL MEDICINE

## 2025-03-19 PROCEDURE — G8417 CALC BMI ABV UP PARAM F/U: HCPCS | Performed by: INTERNAL MEDICINE

## 2025-03-19 PROCEDURE — 1036F TOBACCO NON-USER: CPT | Performed by: INTERNAL MEDICINE

## 2025-03-19 PROCEDURE — G8427 DOCREV CUR MEDS BY ELIG CLIN: HCPCS | Performed by: INTERNAL MEDICINE

## 2025-03-19 PROCEDURE — 1123F ACP DISCUSS/DSCN MKR DOCD: CPT | Performed by: INTERNAL MEDICINE

## 2025-03-19 PROCEDURE — 1159F MED LIST DOCD IN RCRD: CPT | Performed by: INTERNAL MEDICINE

## 2025-03-19 PROCEDURE — 99214 OFFICE O/P EST MOD 30 MIN: CPT | Performed by: INTERNAL MEDICINE

## 2025-03-19 ASSESSMENT — ENCOUNTER SYMPTOMS
SHORTNESS OF BREATH: 0
ABDOMINAL PAIN: 0

## 2025-03-19 NOTE — PROGRESS NOTES
[facility] CARDIOLOGY  [address] [place] [address], [address]  [place] [address]  PHONE: [phone]      25    NAME:  Destiney Brown  : 1940  MRN: [id number]         SUBJECTIVE:   Destiney Brown is a 85 y.o. female seen for a follow up visit regarding the following:     Chief Complaint   Patient presents with    Chronic Heart Failure            HPI:  Follow up  Chronic Heart Failure   .    Ms. Brown presents today for follow-up.  She is doing well and has no complaints today.  Swelling much improved, she still has cramps from time to time but they are also better.  She has more energy and overall has been more active.  No recent hospitalizations or ER visits.  She denies chest pain, shortness of breath, orthopnea, PND, palpitations, syncope.            Cardiac Medications       Potassium Sparing Diuretics       spironolactone (ALDACTONE) 25 MG tablet Take 1 tablet by mouth daily       Nitrates       isosorbide mononitrate (IMDUR) 30 MG extended release tablet TAKE 1 TABLET BY MOUTH EVERY MORNING     nitroGLYCERIN (NITROSTAT) 0.4 MG SL tablet DISSOLVE 1 TABLET UNDER TONGUE EVERY 5 MINUTES AS NEEDED FOR CHEST PAIN       Beta Blockers Cardio-Selective       metoprolol succinate (TOPROL XL) 50 MG extended release tablet Take 1 tablet by mouth daily       Loop Diuretics       furosemide (LASIX) 40 MG tablet Take 1 tablet by mouth daily       Angiotensin II Receptor Antagonists       losartan (COZAAR) 50 MG tablet Take 1 tablet by mouth daily       Direct Factor Xa Inhibitors       apixaban (ELIQUIS) 5 MG TABS tablet Take 1 tablet by mouth 2 times daily                  Past Medical History, Past Surgical History, Family history, Social History, and Medications were all reviewed with the patient today and updated as necessary.     Prior to Admission medications    Medication Sig Start Date End Date Taking? Authorizing Provider   losartan (COZAAR) 50 MG tablet Take 1 tablet by mouth daily 25  Yes

## 2025-03-20 ENCOUNTER — OFFICE VISIT (OUTPATIENT)
Dept: AUDIOLOGY | Age: 85
End: 2025-03-20
Payer: MEDICARE

## 2025-03-20 ENCOUNTER — OFFICE VISIT (OUTPATIENT)
Dept: ENT CLINIC | Age: 85
End: 2025-03-20
Payer: MEDICARE

## 2025-03-20 VITALS — WEIGHT: 170 LBS | BODY MASS INDEX: 26.68 KG/M2 | RESPIRATION RATE: 16 BRPM | HEIGHT: 67 IN

## 2025-03-20 DIAGNOSIS — H90.3 SENSORINEURAL HEARING LOSS, BILATERAL: Primary | ICD-10-CM

## 2025-03-20 DIAGNOSIS — J38.7 PRESBYLARYNX: Primary | ICD-10-CM

## 2025-03-20 DIAGNOSIS — H90.3 SENSORINEURAL HEARING LOSS (SNHL) OF BOTH EARS: ICD-10-CM

## 2025-03-20 PROCEDURE — 99213 OFFICE O/P EST LOW 20 MIN: CPT | Performed by: STUDENT IN AN ORGANIZED HEALTH CARE EDUCATION/TRAINING PROGRAM

## 2025-03-20 PROCEDURE — G8417 CALC BMI ABV UP PARAM F/U: HCPCS | Performed by: STUDENT IN AN ORGANIZED HEALTH CARE EDUCATION/TRAINING PROGRAM

## 2025-03-20 PROCEDURE — 1036F TOBACCO NON-USER: CPT | Performed by: STUDENT IN AN ORGANIZED HEALTH CARE EDUCATION/TRAINING PROGRAM

## 2025-03-20 PROCEDURE — 92567 TYMPANOMETRY: CPT | Performed by: AUDIOLOGIST

## 2025-03-20 PROCEDURE — 1159F MED LIST DOCD IN RCRD: CPT | Performed by: STUDENT IN AN ORGANIZED HEALTH CARE EDUCATION/TRAINING PROGRAM

## 2025-03-20 PROCEDURE — 1123F ACP DISCUSS/DSCN MKR DOCD: CPT | Performed by: STUDENT IN AN ORGANIZED HEALTH CARE EDUCATION/TRAINING PROGRAM

## 2025-03-20 PROCEDURE — G8427 DOCREV CUR MEDS BY ELIG CLIN: HCPCS | Performed by: STUDENT IN AN ORGANIZED HEALTH CARE EDUCATION/TRAINING PROGRAM

## 2025-03-20 PROCEDURE — 92557 COMPREHENSIVE HEARING TEST: CPT | Performed by: AUDIOLOGIST

## 2025-03-20 PROCEDURE — 1090F PRES/ABSN URINE INCON ASSESS: CPT | Performed by: STUDENT IN AN ORGANIZED HEALTH CARE EDUCATION/TRAINING PROGRAM

## 2025-03-20 PROCEDURE — 1160F RVW MEDS BY RX/DR IN RCRD: CPT | Performed by: STUDENT IN AN ORGANIZED HEALTH CARE EDUCATION/TRAINING PROGRAM

## 2025-03-20 ASSESSMENT — ENCOUNTER SYMPTOMS
EYE PAIN: 0
CONSTIPATION: 0
APNEA: 0
SINUS PRESSURE: 0
SINUS PAIN: 0
FACIAL SWELLING: 0
STRIDOR: 0
COUGH: 0
WHEEZING: 0
EYE ITCHING: 0
EYE DISCHARGE: 0
SHORTNESS OF BREATH: 0
DIARRHEA: 0
NAUSEA: 0
CHOKING: 0

## 2025-03-20 NOTE — PROGRESS NOTES
[facility] Office Note    Patient: Destiney Brown  MRN: [id number]  : 1940  Gender:  female  Vital Signs: Resp 16   Ht 1.702 m (5' 7\")   Wt 77.1 kg (170 lb)   BMI 26.63 kg/m²   Date: 3/20/2025    CC:   Chief Complaint   Patient presents with    Follow-up     3 month follow up on voice and rhinorrhea with audio. Pt states her voice has improved greatly. It still affects her some when she is really tired or takes her diuretic but overall pleased. Pt states she uses the nasal spray and she is much better.        HPI:  Destiney Brown is a 85 y.o. female seen in follow-up for hoarseness and hearing loss.  I had her see SLP and gave her Atrovent.  She note significant improvement in her voice.  The Atrovent helps with her rhinorrhea.  She does not wear hearing aids.    Past Medical History, Past Surgical History, Family history, Social History, and Medications were all reviewed with the patient today and updated as necessary.     Allergies   Allergen Reactions    Albuterol Shortness Of Breath     \"coughing spasms and difficulty breathing\"    Amoxicillin Anaphylaxis and Swelling     Other reaction(s): Lips/Mouth swelling-A    Guaifenesin Swelling     Lips swelling    Iodine Rash     Other reaction(s): Hives/Swelling-A    Black [place] Flavoring Agent (Non-Screening)      Ulcerated tongue.    Ciprofloxacin Other (See Comments) and Nausea Only     Other reaction(s): Joint soreness-I    Dronedarone Angioedema, Diarrhea and Itching     Severe diarrhea, lips felt scorched & gum swelling & generalized itching    Lactose Intolerance (Gi)     Omega-3 Fatty Acids Hives    Sulfa Antibiotics Other (See Comments)     Leg cramps    Sulfamethoxazole-Trimethoprim Other (See Comments)     Patient Active Problem List   Diagnosis    Coronary atherosclerosis of native coronary vessel    Symptomatic bradycardia    Hypokalemia    Right middle lobe pneumonia    Sepsis (HCC)    PUD (peptic ulcer disease)    Chest pain    Mild

## 2025-03-20 NOTE — PROGRESS NOTES
AUDIOLOGY EVALUATION    Destiney ADAMS Solder had Tympanometry and Audiometry performed today.    The patient reports hearing loss in her right ear.     Results as follows:    Tympanometry    Type As -  bilaterally    Audiometry    Test Performed - Comprehensive Audiogram    Type of Loss - Right Ear: abnormal hearing: degree of loss is normal to severe sensorineural hearing loss                           Left Ear: abnormal hearing: degree of loss is normal to severe sensorineural hearing loss     SRT   Measurement Right Ear Left Ear   Value 35 30   Unit dB dB     Discrimination  Measurement Right Ear Left Ear   Value 96% 92%   Unit dB dB     Recommend  Binaural amplification and annual audios    Hima Bruner [facility]  Audiologist

## 2025-04-30 ENCOUNTER — OFFICE VISIT (OUTPATIENT)
Dept: ORTHOPEDIC SURGERY | Age: 85
End: 2025-04-30

## 2025-04-30 DIAGNOSIS — M25.511 RIGHT SHOULDER PAIN, UNSPECIFIED CHRONICITY: ICD-10-CM

## 2025-04-30 DIAGNOSIS — M75.101 NONTRAUMATIC TEAR OF RIGHT ROTATOR CUFF, UNSPECIFIED TEAR EXTENT: Primary | ICD-10-CM

## 2025-04-30 NOTE — PROGRESS NOTES
Name: Destiney ADAMS Solder  YOB: 1940  Gender: female  MRN: [id number]  Date of Encounter:  4/30/2025         CC  Chief Complaint   Patient presents with    Shoulder Pain     R       HPI:    History of Present Illness  The patient is an 85-year-old female presenting with right shoulder pain.    Two weeks ago, she experienced a severe, almost faint-inducing pain in her right shoulder while lifting a bag of groceries. The following day, she noticed swelling in the shoulder, which she attempted to alleviate with ice. Despite her efforts, the pain persisted at the same intensity for a week, disrupting her sleep as she typically sleeps on her right side. A few days ago, she regained some mobility in her elbow but continues to experience significant discomfort in her shoulder. She sought care at an urgent care center where she was advised to use a sling, which she did for a day, keeping her arm immobilized. However, she believes she may have reinjured the shoulder yesterday when a door got stuck, leading to noticeable swelling. She suspects a possible rotator cuff tear. She has been managing the pain with Tylenol 8-Hour Arthritis. Approximately 30 years ago, she had a minor tear in the same shoulder, which was treated with a cortisone injection by Dr. Manzo.    Supplemental Information  She has been receiving cortisone injections in her knees from Dr. Lopez and has a history of ulcers.    MEDICATIONS  Tylenol 8-hour arthritis        PAST HISTORY:   Past medical, surgical, family, social history and allergies reviewed by me.       REVIEW OF SYSTEMS:   As noted in HPI.     PHYSICAL EXAMINATION:   Physical Exam  Right shoulder flexion is limited to 10 degrees. Abduction is possible up to 90 degrees, with passive abduction reaching 150 degrees. Internal rotation strength is 5 out of 5, while external rotation strength is 4 out of 5. Abduction strength is 4 out of 5. Tenderness is noted in the long head of

## 2025-05-05 ENCOUNTER — HOSPITAL ENCOUNTER (OUTPATIENT)
Dept: PHYSICAL THERAPY | Age: 85
Setting detail: RECURRING SERIES
Discharge: HOME | End: 2025-05-08
Payer: MEDICARE

## 2025-05-05 DIAGNOSIS — M25.511 ACUTE PAIN OF RIGHT SHOULDER: Primary | ICD-10-CM

## 2025-05-05 PROCEDURE — 97110 THERAPEUTIC EXERCISES: CPT

## 2025-05-05 PROCEDURE — 97162 PT EVAL MOD COMPLEX 30 MIN: CPT

## 2025-05-05 ASSESSMENT — PAIN SCALES - GENERAL: PAINLEVEL_OUTOF10: 3

## 2025-05-05 NOTE — PROGRESS NOTES
Laron Bocanegra MD ENTG [facility]   3/31/2026 10:30 AM Joya Bustos AuD CARENTAUDIO [facility] AMB

## 2025-05-05 NOTE — THERAPY EVALUATION
Destiney ADAMS Solder  : 1940  Primary: Medicare Part A And B (Medicare)  Secondary: [employer]ARP HE[employer] CARE MEDICARE SUPP [facility] @ [address]  [facility] [address]  Phone: [phone]  Fax: [phone] Plan Frequency: 2 times a week for 8 weeks  Plan of Care/Certification Expiration Date: 25        Plan of Care/Certification Expiration Date:  Plan of Care/Certification Expiration Date: 25    Frequency/Duration: Plan Frequency: 2 times a week for 8 weeks      Time In/Out:   Time In: 1348  Time Out: 1432      PT Visit Info:    Progress Note Due Date: 25  Progress Note Counter: 1      Visit Count:  1                OUTPATIENT PHYSICAL THERAPY:             Initial Assessment 2025               Episode (right shoulder pain)         Treatment Diagnosis:     Acute pain of right shoulder  Medical/Referring Diagnosis:    Right shoulder pain, unspecified chronicity  Traumatic tear of right rotator cuff, unspecified tear extent, initial encounter      Referring Physician:  Jacek Bowles MD MD Orders:  PT Eval and Treat   Return MD Appt:  25  Date of Onset:    25  Allergies:  Albuterol, Amoxicillin, Guaifenesin, Iodine, Black walnut flavoring agent (non-screening), Ciprofloxacin, Dronedarone, Lactose intolerance (gi), Omega-3 fatty acids, Sulfa antibiotics, and Sulfamethoxazole-trimethoprim  Restrictions/Precautions:      Restrictions/Precautions: Pacemaker replacement      Medications Last Reviewed: 2025     SUBJECTIVE   History of Injury/Illness (Reason for Referral):  Pt reports feeling sudden severe shoulder pain when trying to lift a bag of groceries out of her grocery cart. She put ice on it and went to Urgent care and X ray was negative. She was given a sling and was referred to an orthopedist. Orthopedist recommended PT. Pt complains of now having catch type pain in her neck, but denies any numbness or tingling.    Patient

## 2025-05-07 ENCOUNTER — HOSPITAL ENCOUNTER (OUTPATIENT)
Dept: PHYSICAL THERAPY | Age: 85
Setting detail: RECURRING SERIES
Discharge: HOME | End: 2025-05-10
Payer: MEDICARE

## 2025-05-07 PROCEDURE — 97110 THERAPEUTIC EXERCISES: CPT

## 2025-05-07 ASSESSMENT — PAIN SCALES - GENERAL: PAINLEVEL_OUTOF10: 7

## 2025-05-07 NOTE — PROGRESS NOTES
Reviewed: 5/7/2025  Updated Objective Findings:    Heavy guarding with protracted right shoulder  PROM after treatment:  Flexion: 119 deg  ABD: 90 deg  ER: 36 deg  IR to belly    Treatment   THERAPEUTIC EXERCISE: (4 0 minutes): right pect and biceps with wrist extended stretch. Reviewed HEP as per grid for ROM quality and muscle facilitation. PROM to 4-- physiological mobilizations to right shoulder ER,IR, flexion and ABD for ROM- ice to right shoulder for pain control.    Exercises per grid below to improve mobility and strength.  Required maximal verbal and tactile cues to promote proper body alignment and promote proper body posture, and relaxing out of guarded position    Date:  5/5/25 Date:  5/7/25 Date:     Activity/Exercise Parameters Parameters Parameters   Shoulder ER Standing active without band Supine supported at side 0# 2x5    IR  Gentle belly press 2x5    Shoulder flexion ---> Supine PROM    Scapular retraction with chin tuck Seated and supine Supine 2x10    Midtrap press with scapular retraction Supine with BUE supported with towel rolls Supine gentle press into pillow x5                          Treatment/Session Summary:    Treatment Assessment: poor tolerance to ROM. Less pain when shoulder was gently distracted and with pt holding scapula retracted    Communication/Consultation:  Therapy Evaluation sent to referring provider  Equipment provided today:  HEP  Recommendations/Intent for next treatment session: Next visit will focus on ROM, muscle facilitation, ice for pain    >Total Treatment Billable Duration:  40 minutes  Time In: 1116  Time Out: 1159     Kathy Baron PT         Charge Capture  Events  [employer] Portal  Appt Desk  Attendance Report     Future Appointments   Date Time Provider Department Center   5/12/2025 12:00 PM Kathy Baron PT [facility]   5/16/2025 12:00 PM Kathy Baron PT [facility]   5/20/2025 12:00 PM Kathy Baron PT [facility]   5/23/2025

## 2025-05-12 ENCOUNTER — HOSPITAL ENCOUNTER (OUTPATIENT)
Dept: PHYSICAL THERAPY | Age: 85
Setting detail: RECURRING SERIES
Discharge: HOME | End: 2025-05-15
Payer: MEDICARE

## 2025-05-12 PROCEDURE — 97110 THERAPEUTIC EXERCISES: CPT

## 2025-05-12 ASSESSMENT — PAIN SCALES - GENERAL: PAINLEVEL_OUTOF10: 2

## 2025-05-12 NOTE — PROGRESS NOTES
to lateral hip    Treatment   THERAPEUTIC EXERCISE: (30 minutes): right pect and biceps with wrist extended stretch. Grade 4-- to 4- physiological mobilizations  shoulder ER,IR, flexion and ABD for ROM. Exercises per grid below to improve mobility and strength.  Required maximal verbal and tactile cues to promote proper body alignment and promote proper body posture, and relaxing out of guarded position    Date:  5/12/25 Date:     Activity/Exercise Parameters Parameters   Shoulder ER Supine supported at side 1# x5,  x5    IR Gentle belly press 2x5, pt hold at end ROM x10    Shoulder flexion Supine wand 2x5    Scapular retraction with posture correction Standing with light tband 2x5    Midtrap  Propped 0#   2 x5                       Treatment/Session Summary:    Treatment Assessment: better  tolerance to ROM. Improving motion    Communication/Consultation:  Therapy Evaluation sent to referring provider  Equipment provided today:  HEP  Recommendations/Intent for next treatment session: Next visit will focus on ROM, muscle facilitation, ice for pain. Pt to try wand flexion in supine at home    >Total Treatment Billable Duration:  30 minutes  Time In: 1200  Time Out: 1230     Kathy Baron PT         Charge Capture  Events  [employer] Portal  Appt Desk  Attendance Report     Future Appointments   Date Time Provider Department Center   5/16/2025 12:00 PM Kathy Baron, PT [facility]   5/20/2025 12:00 PM Kathy Baron, PT [facility]   5/23/2025  1:00 PM Kathy Baron, PT [facility]   5/27/2025  1:00 PM Kathy Baron, PT [facility]   5/30/2025  1:00 PM Kathy Baron, PT [facility]   6/3/2025 11:15 AM Kathy Baron, PT [facility]   6/6/2025 11:15 AM Kathy Baron, PT [facility]   6/19/2025 11:00 AM Jacek Bowles MD [facility]   7/23/2025 10:30 AM Cameron Rashid MD [facility]   9/22/2025 10:45 AM Bobby Light III, MD [facility]   3/31/2026 10:30 AM

## 2025-05-16 ENCOUNTER — HOSPITAL ENCOUNTER (OUTPATIENT)
Dept: PHYSICAL THERAPY | Age: 85
Setting detail: RECURRING SERIES
Discharge: HOME | End: 2025-05-19
Payer: MEDICARE

## 2025-05-16 PROCEDURE — 97110 THERAPEUTIC EXERCISES: CPT

## 2025-05-16 ASSESSMENT — PAIN SCALES - GENERAL: PAINLEVEL_OUTOF10: 6

## 2025-05-16 NOTE — PROGRESS NOTES
Destiney ADAMS Solder  : 1940  Primary: Medicare Part A And B (Medicare)  Secondary: [employer] HE[employer] CARE MEDICARE SUPP [facility] @ [address]  [facility] [address]  Phone: [phone]  Fax: [phone] Plan Frequency: 2 times a week for 8 weeks    Plan of Care/Certification Expiration Date: 25        Plan of Care/Certification Expiration Date:  Plan of Care/Certification Expiration Date: 25    Frequency/Duration: Plan Frequency: 2 times a week for 8 weeks      Time In/Out:   Time In: 1205  Time Out: 1240      PT Visit Info:    Progress Note Due Date: 25  Progress Note Counter: 1      Visit Count:  4    OUTPATIENT PHYSICAL THERAPY:   Treatment Note 2025       Episode  (right shoulder pain)               Treatment Diagnosis:    Acute pain of right shoulder  Medical/Referring Diagnosis:    Right shoulder pain, unspecified chronicity  Traumatic tear of right rotator cuff, unspecified tear extent, initial encounter      Referring Physician:  Jacek Bowles MD MD Orders:  PT Eval and Treat   Return MD Appt:  25   Date of Onset:  25  Allergies:   Albuterol, Amoxicillin, Guaifenesin, Iodine, Black walnut flavoring agent (non-screening), Ciprofloxacin, Dronedarone, Lactose intolerance (gi), Omega-3 fatty acids, Sulfa antibiotics, and Sulfamethoxazole-trimethoprim  Restrictions/Precautions:     Restrictions/Precautions: pacemaker     Interventions Planned (Treatment may consist of any combination of the following):  Current Treatment Recommendations: Strengthening; ROM; Neuromuscular re-education; Manual; Pain management; Home exercise program; Modalities; Positioning    See Assessment Note    Subjective Comments: Pt reports doing the HEP and was doing great until having to use our computer for a long period of time yesterday. It was killing me yesterday afternoon and last night. I couldn't even do the exercises      Initial Pain Level:   0-

## 2025-05-20 ENCOUNTER — HOSPITAL ENCOUNTER (OUTPATIENT)
Dept: PHYSICAL THERAPY | Age: 85
Setting detail: RECURRING SERIES
Discharge: HOME | End: 2025-05-23
Payer: MEDICARE

## 2025-05-20 PROCEDURE — 97110 THERAPEUTIC EXERCISES: CPT

## 2025-05-20 ASSESSMENT — PAIN SCALES - GENERAL: PAINLEVEL_OUTOF10: 6

## 2025-05-20 NOTE — PROGRESS NOTES
5/20/25:  Flexion: 102/ 135 deg              /  ABD: 75/90 deg                    supine active 90/ 102 deg  ER: 20/50 deg                      60/64 deg  IR: 45 deg at side/ 68 deg    Treatment   THERAPEUTIC EXERCISE: (40 minutes): right pect stretch. Ice on the shoulder for pain control during Grade 4-- to 4- physiological mobilizations  shoulder ER,IR, flexion and ABD for ROM. Exercises per grid below for HEP upgrade to improve mobility and strength.  Required moderate repetitive verbal and tactile cues to promote proper body alignment and promote proper body posture   Date:  5/20/25   Activity/Exercise Parameters   Shoulder ER Standing light tband 2x5   IR Standing light tband 2x5   Shoulder flexion Supine wand 1# x5, 0# x5   Scapular retraction  Standing light tband 2x5   Midtrap  Propped 1# x5   Low trap Side place and hold scapular depression 2x5               Treatment/Session Summary:    Treatment Assessment: better tolerance to ROM with the use of ice during session.   Communication/Consultation:  Therapy Evaluation sent to referring provider  Equipment provided today:  HEP  Recommendations/Intent for next treatment session: Next visit will focus on ROM, muscle facilitation, ice for pain.     >Total Treatment Billable Duration:  40 minutes  Time In: 1158  Time Out: 1238     Kathy Baron PT         Charge Capture  Events  [employer] Portal  Appt Desk  Attendance Report     Future Appointments   Date Time Provider Department Center   5/23/2025  1:00 PM Kathy Baron, PT [facility]   5/27/2025  1:00 PM Kathy Baron, PT [facility]   5/30/2025  1:00 PM Kathy Baron, PT [facility]   6/3/2025 11:15 AM Kathy Baron, PT [facility]   6/6/2025 11:15 AM Kathy Baron, PT [facility]   6/19/2025 11:00 AM Jacek Bowles MD [facility]   7/23/2025 10:30 AM Cameron Rashid MD [facility] AM[facility]   9/22/2025 10:45 AM Bobby Light III, MD [facility] AMB   3/31/2026 10:30 AM

## 2025-05-23 ENCOUNTER — HOSPITAL ENCOUNTER (OUTPATIENT)
Dept: PHYSICAL THERAPY | Age: 85
Setting detail: RECURRING SERIES
Discharge: HOME | End: 2025-05-26
Payer: MEDICARE

## 2025-05-23 PROCEDURE — 97110 THERAPEUTIC EXERCISES: CPT

## 2025-05-23 ASSESSMENT — PAIN SCALES - GENERAL: PAINLEVEL_OUTOF10: 6

## 2025-05-23 NOTE — PROGRESS NOTES
Destiney ADAMS Solder  : 1940  Primary: Medicare Part A And B (Medicare)  Secondary: [employer]ARP HEALTH CARE MEDICARE SUPP [facility] @ [address]  [facility] [address]  Phone: [phone]  Fax: [phone] Plan Frequency: 2 times a week for 8 weeks    Plan of Care/Certification Expiration Date: 25        Plan of Care/Certification Expiration Date:  Plan of Care/Certification Expiration Date: 25    Frequency/Duration: Plan Frequency: 2 times a week for 8 weeks      Time In/Out:   Time In: 1257  Time Out: 1346      PT Visit Info:    Progress Note Due Date: 25  Progress Note Counter: 1      Visit Count:  6    OUTPATIENT PHYSICAL THERAPY:   Treatment Note 2025       Episode  (right shoulder pain)               Treatment Diagnosis:    Acute pain of right shoulder  Medical/Referring Diagnosis:    Right shoulder pain, unspecified chronicity  Traumatic tear of right rotator cuff, unspecified tear extent, initial encounter      Referring Physician:  Jacek Bowles MD MD Orders:  PT Eval and Treat   Return MD Appt:  25   Date of Onset:  25  Allergies:   Albuterol, Amoxicillin, Guaifenesin, Iodine, Black walnut flavoring agent (non-screening), Ciprofloxacin, Dronedarone, Lactose intolerance (gi), Omega-3 fatty acids, Sulfa antibiotics, and Sulfamethoxazole-trimethoprim  Restrictions/Precautions:     Restrictions/Precautions: pacemaker     Interventions Planned (Treatment may consist of any combination of the following):  Current Treatment Recommendations: Strengthening; ROM; Neuromuscular re-education; Manual; Pain management; Home exercise program; Modalities; Positioning      Subjective Comments: Pt reports feeling great, able to feed myself and with no pain until yesterday. Not sure if I did the exercises wrong. Have pain in the front and back of the rib cage    Initial Pain Level:   0-  6/10  Post Session Pain Level:     1  /10  Medications Last

## 2025-05-27 ENCOUNTER — HOSPITAL ENCOUNTER (OUTPATIENT)
Dept: PHYSICAL THERAPY | Age: 85
Setting detail: RECURRING SERIES
Discharge: HOME | End: 2025-05-30
Payer: MEDICARE

## 2025-05-27 PROCEDURE — 97140 MANUAL THERAPY 1/> REGIONS: CPT

## 2025-05-27 PROCEDURE — 97110 THERAPEUTIC EXERCISES: CPT

## 2025-05-27 ASSESSMENT — PAIN SCALES - GENERAL: PAINLEVEL_OUTOF10: 8

## 2025-05-27 NOTE — PROGRESS NOTES
HEP  Recommendations/Intent for next treatment session: Next visit will focus on ROM and muscle facilitation as tolerated if cleared by the doctor.  >Total Treatment Billable Duration:  29 minutes  Time In: 1300  Time Out: 1332     Kathy Baron PT         Charge Capture  Events  [employer] Portal  Appt Desk  Attendance Report     Future Appointments   Date Time Provider Department Center   5/28/2025  8:20 AM Madai Ferrari, APRN - NP [facility]   5/30/2025  1:00 PM Kathy Baron, PT [facility]   6/3/2025 11:15 AM Kathy Baron, PT [facility]   6/6/2025 11:15 AM Kathy Baron, PT [facility]   6/19/2025 11:00 AM Jacek Bowles MD [facility] AM[facility]   7/23/2025 10:30 AM Cameron Rashid MD [facility] AMB   9/22/2025 10:45 AM Bobby Light III, MD UCR[facility] AMB   3/31/2026 10:30 AM Laron Bocanegra MD ENTG [facility] AMB   3/31/2026 10:30 AM Joya Bustos AuD [facility] AMB

## 2025-05-28 ENCOUNTER — OFFICE VISIT (OUTPATIENT)
Dept: FAMILY MEDICINE CLINIC | Facility: CLINIC | Age: 85
End: 2025-05-28
Payer: MEDICARE

## 2025-05-28 ENCOUNTER — TELEPHONE (OUTPATIENT)
Dept: ORTHOPEDIC SURGERY | Age: 85
End: 2025-05-28

## 2025-05-28 ENCOUNTER — RESULTS FOLLOW-UP (OUTPATIENT)
Dept: FAMILY MEDICINE CLINIC | Facility: CLINIC | Age: 85
End: 2025-05-28

## 2025-05-28 VITALS
DIASTOLIC BLOOD PRESSURE: 78 MMHG | RESPIRATION RATE: 15 BRPM | HEIGHT: 67 IN | BODY MASS INDEX: 26.06 KG/M2 | HEART RATE: 73 BPM | TEMPERATURE: 97.3 F | WEIGHT: 166 LBS | OXYGEN SATURATION: 98 % | SYSTOLIC BLOOD PRESSURE: 128 MMHG

## 2025-05-28 DIAGNOSIS — R59.9 ADENOPATHY: ICD-10-CM

## 2025-05-28 DIAGNOSIS — I50.9 CHRONIC CONGESTIVE HEART FAILURE, UNSPECIFIED HEART FAILURE TYPE (HCC): ICD-10-CM

## 2025-05-28 DIAGNOSIS — M10.9 GOUT, ARTHRITIS: ICD-10-CM

## 2025-05-28 DIAGNOSIS — R11.0 NAUSEA: ICD-10-CM

## 2025-05-28 DIAGNOSIS — R53.83 OTHER FATIGUE: ICD-10-CM

## 2025-05-28 DIAGNOSIS — R07.81 RIB PAIN: ICD-10-CM

## 2025-05-28 DIAGNOSIS — R53.83 OTHER FATIGUE: Primary | ICD-10-CM

## 2025-05-28 PROBLEM — I49.5 SICK SINUS SYNDROME (HCC): Status: ACTIVE | Noted: 2017-06-26

## 2025-05-28 PROBLEM — J06.9 ACUTE UPPER RESPIRATORY INFECTION: Status: ACTIVE | Noted: 2021-05-22

## 2025-05-28 PROBLEM — M19.011 ARTHROPATHY OF RIGHT SHOULDER: Status: ACTIVE | Noted: 2025-04-26

## 2025-05-28 PROBLEM — F41.9 ANXIETY: Status: ACTIVE | Noted: 2021-05-22

## 2025-05-28 PROBLEM — F32.A DEPRESSION: Status: ACTIVE | Noted: 2018-06-15

## 2025-05-28 PROBLEM — S81.801A UNSPECIFIED OPEN WOUND, RIGHT LOWER LEG, INITIAL ENCOUNTER: Status: ACTIVE | Noted: 2023-03-19

## 2025-05-28 LAB
ALBUMIN SERPL-MCNC: 3.8 G/DL (ref 3.2–4.6)
ALBUMIN/GLOB SERPL: 1.1 (ref 1–1.9)
ALP SERPL-CCNC: 115 U/L (ref 35–104)
ALT SERPL-CCNC: 12 U/L (ref 8–45)
ANION GAP SERPL CALC-SCNC: 9 MMOL/L (ref 7–16)
APPEARANCE UR: ABNORMAL
AST SERPL-CCNC: 20 U/L (ref 15–37)
BACTERIA URNS QL MICRO: ABNORMAL /HPF
BASOPHILS # BLD: 0.09 K/UL (ref 0–0.2)
BASOPHILS NFR BLD: 1.2 % (ref 0–2)
BILIRUB SERPL-MCNC: 0.9 MG/DL (ref 0–1.2)
BILIRUB UR QL: NEGATIVE
BUN SERPL-MCNC: 25 MG/DL (ref 8–23)
CALCIUM SERPL-MCNC: 9.8 MG/DL (ref 8.8–10.2)
CASTS URNS QL MICRO: 0 /LPF
CHLORIDE SERPL-SCNC: 104 MMOL/L (ref 98–107)
CO2 SERPL-SCNC: 25 MMOL/L (ref 20–29)
COLOR UR: ABNORMAL
CREAT SERPL-MCNC: 0.71 MG/DL (ref 0.6–1.1)
CRP SERPL-MCNC: 0.3 MG/DL (ref 0–0.4)
CRYSTALS URNS QL MICRO: 0 /LPF
DIFFERENTIAL METHOD BLD: ABNORMAL
EOSINOPHIL # BLD: 0.09 K/UL (ref 0–0.8)
EOSINOPHIL NFR BLD: 1.2 % (ref 0.5–7.8)
EPI CELLS #/AREA URNS HPF: ABNORMAL /HPF
ERYTHROCYTE [DISTWIDTH] IN BLOOD BY AUTOMATED COUNT: 14.7 % (ref 11.9–14.6)
ERYTHROCYTE [SEDIMENTATION RATE] IN BLOOD: 13 MM/HR (ref 0–30)
GLOBULIN SER CALC-MCNC: 3.4 G/DL (ref 2.3–3.5)
GLUCOSE SERPL-MCNC: 112 MG/DL (ref 70–99)
GLUCOSE UR STRIP.AUTO-MCNC: NEGATIVE MG/DL
HCT VFR BLD AUTO: 41.1 % (ref 35.8–46.3)
HGB BLD-MCNC: 13.3 G/DL (ref 11.7–15.4)
HGB UR QL STRIP: ABNORMAL
IMM GRANULOCYTES # BLD AUTO: 0.02 K/UL (ref 0–0.5)
IMM GRANULOCYTES NFR BLD AUTO: 0.3 % (ref 0–5)
KETONES UR QL STRIP.AUTO: NEGATIVE MG/DL
LEUKOCYTE ESTERASE UR QL STRIP.AUTO: ABNORMAL
LYMPHOCYTES # BLD: 1.28 K/UL (ref 0.5–4.6)
LYMPHOCYTES NFR BLD: 16.4 % (ref 13–44)
MCH RBC QN AUTO: 30.6 PG (ref 26.1–32.9)
MCHC RBC AUTO-ENTMCNC: 32.4 G/DL (ref 31.4–35)
MCV RBC AUTO: 94.7 FL (ref 82–102)
MONOCYTES # BLD: 0.82 K/UL (ref 0.1–1.3)
MONOCYTES NFR BLD: 10.5 % (ref 4–12)
MUCOUS THREADS URNS QL MICRO: 0 /LPF
NEUTS SEG # BLD: 5.51 K/UL (ref 1.7–8.2)
NEUTS SEG NFR BLD: 70.4 % (ref 43–78)
NITRITE UR QL STRIP.AUTO: NEGATIVE
NRBC # BLD: 0 K/UL (ref 0–0.2)
OTHER OBSERVATIONS: ABNORMAL
PH UR STRIP: 6 (ref 5–9)
PLATELET # BLD AUTO: 218 K/UL (ref 150–450)
PMV BLD AUTO: 11.4 FL (ref 9.4–12.3)
POTASSIUM SERPL-SCNC: 4.6 MMOL/L (ref 3.5–5.1)
PROT SERPL-MCNC: 7.2 G/DL (ref 6.3–8.2)
PROT UR STRIP-MCNC: 30 MG/DL
RBC # BLD AUTO: 4.34 M/UL (ref 4.05–5.2)
RBC #/AREA URNS HPF: 0 /HPF
SODIUM SERPL-SCNC: 139 MMOL/L (ref 136–145)
SP GR UR REFRACTOMETRY: 1.02 (ref 1–1.02)
TSH W FREE THYROID IF ABNORMAL: 1.89 UIU/ML (ref 0.27–4.2)
URATE SERPL-MCNC: 3.4 MG/DL (ref 2.5–7.1)
URINE CULTURE IF INDICATED: ABNORMAL
UROBILINOGEN UR QL STRIP.AUTO: 0.2 EU/DL (ref 0.2–1)
WBC # BLD AUTO: 7.8 K/UL (ref 4.3–11.1)
WBC URNS QL MICRO: >100 /HPF

## 2025-05-28 PROCEDURE — 1160F RVW MEDS BY RX/DR IN RCRD: CPT

## 2025-05-28 PROCEDURE — G8427 DOCREV CUR MEDS BY ELIG CLIN: HCPCS

## 2025-05-28 PROCEDURE — 1090F PRES/ABSN URINE INCON ASSESS: CPT

## 2025-05-28 PROCEDURE — G8417 CALC BMI ABV UP PARAM F/U: HCPCS

## 2025-05-28 PROCEDURE — 1036F TOBACCO NON-USER: CPT

## 2025-05-28 PROCEDURE — 1123F ACP DISCUSS/DSCN MKR DOCD: CPT

## 2025-05-28 PROCEDURE — 99214 OFFICE O/P EST MOD 30 MIN: CPT

## 2025-05-28 PROCEDURE — 1159F MED LIST DOCD IN RCRD: CPT

## 2025-05-28 PROCEDURE — G2211 COMPLEX E/M VISIT ADD ON: HCPCS

## 2025-05-28 SDOH — ECONOMIC STABILITY: TRANSPORTATION INSECURITY
IN THE PAST 12 MONTHS, HAS THE LACK OF TRANSPORTATION KEPT YOU FROM MEDICAL APPOINTMENTS OR FROM GETTING MEDICATIONS?: NO

## 2025-05-28 SDOH — ECONOMIC STABILITY: INCOME INSECURITY: IN THE LAST 12 MONTHS, WAS THERE A TIME WHEN YOU WERE NOT ABLE TO PAY THE MORTGAGE OR RENT ON TIME?: NO

## 2025-05-28 SDOH — ECONOMIC STABILITY: FOOD INSECURITY: WITHIN THE PAST 12 MONTHS, YOU WORRIED THAT YOUR FOOD WOULD RUN OUT BEFORE YOU GOT MONEY TO BUY MORE.: NEVER TRUE

## 2025-05-28 SDOH — ECONOMIC STABILITY: TRANSPORTATION INSECURITY
IN THE PAST 12 MONTHS, HAS LACK OF TRANSPORTATION KEPT YOU FROM MEETINGS, WORK, OR FROM GETTING THINGS NEEDED FOR DAILY LIVING?: NO

## 2025-05-28 SDOH — ECONOMIC STABILITY: FOOD INSECURITY: WITHIN THE PAST 12 MONTHS, THE FOOD YOU BOUGHT JUST DIDN'T LAST AND YOU DIDN'T HAVE MONEY TO GET MORE.: NEVER TRUE

## 2025-05-28 ASSESSMENT — ENCOUNTER SYMPTOMS
RHINORRHEA: 0
SWOLLEN GLANDS: 1
EYE REDNESS: 0
VISUAL CHANGE: 0
ABDOMINAL PAIN: 1
SHORTNESS OF BREATH: 0
EYE PAIN: 0
DIARRHEA: 0
BLOOD IN STOOL: 0
EYE ITCHING: 0
PHOTOPHOBIA: 0
COUGH: 0
EYE DISCHARGE: 0
CHEST TIGHTNESS: 0
CONSTIPATION: 0
CHANGE IN BOWEL HABIT: 0
NAUSEA: 1
SORE THROAT: 0
VOMITING: 0

## 2025-05-28 ASSESSMENT — PATIENT HEALTH QUESTIONNAIRE - PHQ9
3. TROUBLE FALLING OR STAYING ASLEEP: SEVERAL DAYS
8. MOVING OR SPEAKING SO SLOWLY THAT OTHER PEOPLE COULD HAVE NOTICED. OR THE OPPOSITE, BEING SO FIGETY OR RESTLESS THAT YOU HAVE BEEN MOVING AROUND A LOT MORE THAN USUAL: NOT AT ALL
5. POOR APPETITE OR OVEREATING: NOT AT ALL
SUM OF ALL RESPONSES TO PHQ QUESTIONS 1-9: 2
2. FEELING DOWN, DEPRESSED OR HOPELESS: NOT AT ALL
6. FEELING BAD ABOUT YOURSELF - OR THAT YOU ARE A FAILURE OR HAVE LET YOURSELF OR YOUR FAMILY DOWN: NOT AT ALL
4. FEELING TIRED OR HAVING LITTLE ENERGY: SEVERAL DAYS
9. THOUGHTS THAT YOU WOULD BE BETTER OFF DEAD, OR OF HURTING YOURSELF: NOT AT ALL
SUM OF ALL RESPONSES TO PHQ QUESTIONS 1-9: 2
5. POOR APPETITE OR OVEREATING: NOT AT ALL
7. TROUBLE CONCENTRATING ON THINGS, SUCH AS READING THE NEWSPAPER OR WATCHING TELEVISION: NOT AT ALL
2. FEELING DOWN, DEPRESSED OR HOPELESS: NOT AT ALL
SUM OF ALL RESPONSES TO PHQ QUESTIONS 1-9: 2
SUM OF ALL RESPONSES TO PHQ QUESTIONS 1-9: 2
6. FEELING BAD ABOUT YOURSELF - OR THAT YOU ARE A FAILURE OR HAVE LET YOURSELF OR YOUR FAMILY DOWN: NOT AT ALL
1. LITTLE INTEREST OR PLEASURE IN DOING THINGS: NOT AT ALL
SUM OF ALL RESPONSES TO PHQ QUESTIONS 1-9: 2
10. IF YOU CHECKED OFF ANY PROBLEMS, HOW DIFFICULT HAVE THESE PROBLEMS MADE IT FOR YOU TO DO YOUR WORK, TAKE CARE OF THINGS AT HOME, OR GET ALONG WITH OTHER PEOPLE: NOT DIFFICULT AT ALL
7. TROUBLE CONCENTRATING ON THINGS, SUCH AS READING THE NEWSPAPER OR WATCHING TELEVISION: NOT AT ALL
10. IF YOU CHECKED OFF ANY PROBLEMS, HOW DIFFICULT HAVE THESE PROBLEMS MADE IT FOR YOU TO DO YOUR WORK, TAKE CARE OF THINGS AT HOME, OR GET ALONG WITH OTHER PEOPLE: NOT DIFFICULT AT ALL
9. THOUGHTS THAT YOU WOULD BE BETTER OFF DEAD, OR OF HURTING YOURSELF: NOT AT ALL
1. LITTLE INTEREST OR PLEASURE IN DOING THINGS: NOT AT ALL
4. FEELING TIRED OR HAVING LITTLE ENERGY: SEVERAL DAYS
3. TROUBLE FALLING OR STAYING ASLEEP: SEVERAL DAYS
8. MOVING OR SPEAKING SO SLOWLY THAT OTHER PEOPLE COULD HAVE NOTICED. OR THE OPPOSITE - BEING SO FIDGETY OR RESTLESS THAT YOU HAVE BEEN MOVING AROUND A LOT MORE THAN USUAL: NOT AT ALL

## 2025-05-28 NOTE — TELEPHONE ENCOUNTER
Called patient to discuss. She said that she is having a lot of pain with physcial therapy and asked us to look over the notes from her PT. She said she would like to come in to be reevaluated. I told her that that wasn't a problem and got her scheduled for next Wednesday at 10:20am at [facility].     She was very grateful and showed appreciation for the phone call and sooner appointment to reevaluate.

## 2025-05-28 NOTE — TELEPHONE ENCOUNTER
She has an appointment on June 19 and she wants to speak to you. She wants to stop PT if possible and doesn't want to wait to ask at her appt on the 19th

## 2025-05-28 NOTE — PROGRESS NOTES
[facility]    PROGRESS NOTE    Destiney ADAMS Solder is a 85 y.o., female who presents for:  Chief Complaint   Patient presents with    rib cage pain      X 3 weeks     Fatigue       SUBJECTIVE    Patient reports swollen and tender lymph nodes along the lower rib cage/upper abdomen that she noticed at physical therapy last week for her right shoulder pain; patient notes the lymph nodes were more swollen than they are today. Patient also reports dizziness and extreme fatigue for the last 3 weeks with nausea in the last week. Patient wakes up every morning exhaused, and is having a persisting dull headache, and is not hungry (only eating supper). Patient notes chills in the afternoon as well. Patient notes that this fatigue is not from her congestive heart failure - she is not having shortness of breath or coughing.       Patient states that her weights have been stable but she had gained 8 pounds at [place] because she wasn't eating as healthy (usually avoids sweets) as she should during the holidays; she was trying to lose the pounds over the last 5 months but couldn't; then she just lost 7 pounds in the last week (back to 160 pounds). Patient takes lasix 40mg as needed (when ankles are swollen and fingers puffy), usually once a week (takes with potassium). Patient states she has neuropathy and can't feel feet half the time.     [facility] and had her last Colonoscopy in 2023. 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 Patient knows she's due for the dilation when she ahs right upper quadrant discomfort, which she is having, but she also notes addition right lower rib pain with the swollen lymph nodes.  Patient has had a cholecystectomy.    Patient was recently told by her hand surgeon (Vani

## 2025-05-29 LAB — ANA SER QL: NEGATIVE

## 2025-05-30 ENCOUNTER — HOSPITAL ENCOUNTER (OUTPATIENT)
Dept: PHYSICAL THERAPY | Age: 85
Setting detail: RECURRING SERIES
End: 2025-05-30
Payer: MEDICARE

## 2025-05-30 ENCOUNTER — TELEPHONE (OUTPATIENT)
Dept: FAMILY MEDICINE CLINIC | Facility: CLINIC | Age: 85
End: 2025-05-30

## 2025-05-30 DIAGNOSIS — N30.01 ACUTE CYSTITIS WITH HEMATURIA: Primary | ICD-10-CM

## 2025-05-30 LAB
BACTERIA SPEC CULT: ABNORMAL
BACTERIA SPEC CULT: ABNORMAL
SERVICE CMNT-IMP: ABNORMAL

## 2025-05-30 RX ORDER — NITROFURANTOIN 25; 75 MG/1; MG/1
100 CAPSULE ORAL 2 TIMES DAILY
Qty: 10 CAPSULE | Refills: 0 | Status: SHIPPED | OUTPATIENT
Start: 2025-05-30 | End: 2025-06-04

## 2025-05-30 NOTE — TELEPHONE ENCOUNTER
Called patient to follow up on urine culture results showing a urinary tract infection with gram positive rods - discussed which antibiotic would be best given the patient's allergies - patient states she knows she's done well with doxycycline in the past. Discussed trying Macrobid given this is more appropriate for a urinary tract infection and concentrates well in the bladder - patient has good kidney function so should be able to tolerate it for a short amount of time (5 days). Patient agrees to try it - will send it to her pharmacy today. Advised patient to send a message if she has any reactions to Macrobid.     Patient went to the Gastroenterologist today and is scheduled for an endoscopy with ultrasound afterward on Monday morning. Patient states her gastroenterologist is very concerned about the Duodenal structure that hasn't been addressed in over a year due to her hospitalization for congestive heart failure. Patient was also restarted on her omeprazole.     Patient verbalized understanding and agreed with the above plan of care.    Advised patient to follow up with the [facility] at [phone] if she needs anything else.     GREGORIA Fried - MAY

## 2025-05-30 NOTE — PROGRESS NOTES
Spoke with patient via phone. She reports having a bladder infection and having other tests because of lymph node swelling. She will cancel today's visit and return for her next appointment.  Kathy Baron PT

## 2025-06-02 ENCOUNTER — TELEPHONE (OUTPATIENT)
Age: 85
End: 2025-06-02

## 2025-06-02 NOTE — TELEPHONE ENCOUNTER
Pt is calling about needing an Echocardiogram. Pt states she is being required to get a current one before she can be scheduled for an upper endoscopy. Pt states the last one was in 2024. Please call and advise.

## 2025-06-03 ENCOUNTER — TELEPHONE (OUTPATIENT)
Dept: FAMILY MEDICINE CLINIC | Facility: CLINIC | Age: 85
End: 2025-06-03

## 2025-06-03 ENCOUNTER — HOSPITAL ENCOUNTER (OUTPATIENT)
Dept: PHYSICAL THERAPY | Age: 85
Setting detail: RECURRING SERIES
Discharge: HOME | End: 2025-06-06
Payer: MEDICARE

## 2025-06-03 DIAGNOSIS — I50.32 CHRONIC HEART FAILURE WITH PRESERVED EJECTION FRACTION (HCC): Primary | ICD-10-CM

## 2025-06-03 DIAGNOSIS — I50.32 CHRONIC DIASTOLIC CONGESTIVE HEART FAILURE (HCC): ICD-10-CM

## 2025-06-03 PROCEDURE — 97110 THERAPEUTIC EXERCISES: CPT

## 2025-06-03 PROCEDURE — 97035 APP MDLTY 1+ULTRASOUND EA 15: CPT

## 2025-06-03 ASSESSMENT — PAIN SCALES - GENERAL: PAINLEVEL_OUTOF10: 8

## 2025-06-03 NOTE — PROGRESS NOTES
Destiney ADAMS Solder  : 1940  Primary: Medicare Part A And B (Medicare)  Secondary: AARP HEALTH CARE MEDICARE SUPP [facility] @ [address]  [facility] [address]  Phone: [phone]  Fax: [phone] Plan Frequency: 2 times a week for 8 weeks    Plan of Care/Certification Expiration Date: 25        Plan of Care/Certification Expiration Date:  Plan of Care/Certification Expiration Date: 25    Frequency/Duration: Plan Frequency: 2 times a week for 8 weeks      Time In/Out:   Time In: 1115  Time Out: 1155      PT Visit Info:    Progress Note Due Date: 25  Progress Note Counter: 1      Visit Count:  8    OUTPATIENT PHYSICAL THERAPY:   Treatment Note 6/3/2025       Episode  (right shoulder pain)               Treatment Diagnosis:    Acute pain of right shoulder  Medical/Referring Diagnosis:    Right shoulder pain, unspecified chronicity  Traumatic tear of right rotator cuff, unspecified tear extent, initial encounter      Referring Physician:  Jacek Bowles MD MD Orders:  PT Eval and Treat   Return MD Appt:  25   Date of Onset:  25  Allergies:   Albuterol, Amoxicillin, Guaifenesin, Iodine, Black walnut flavoring agent (non-screening), Ciprofloxacin, Dronedarone, Fish oil, Lactose intolerance (gi), Nsaids, Omega-3 fatty acids, Povidone iodine, Statins, Strawberry, Sulfa antibiotics, and Sulfamethoxazole-trimethoprim  Restrictions/Precautions:     Restrictions/Precautions: pacemaker     Interventions Planned (Treatment may consist of any combination of the following):  Current Treatment Recommendations: Strengthening; ROM; Neuromuscular re-education; Manual; Pain management; Home exercise program; Modalities; Positioning      Subjective Comments: Pt reports having an x-ray of the chest. Nothing about the lymph nodes. Still have swelling and discomfort along the left and right ribs, right being worse. The muscle on top of the right shoulder is

## 2025-06-03 NOTE — TELEPHONE ENCOUNTER
Called patient to see how she was doing after having the endoscopy and ultrasound and patient states she was not able to get the endoscopy because she didn't have an up to date echocardiogram (last was done in January 2024 with an EF of 55-60%; left atrium is severely dilated with left atrial volume index is severely increased >48 mL/m2). Patient needs an order for a new echocardiogram or she cannot move forward with the endoscopy. Patient put a call into the cardiologists office but hasn't heard back. A STAT order was placed for the ECHO and gave patient the scheduling number to call to make an appointment.    Patient states that she is tolerating the Macrobid antibiotics and notices that her urine is not as cloudy -she denies any fevers or chills or urinary symptoms.     Advised patient to follow up with a [employer] message after she has her endoscopy.    Patient verbalized understanding and agreed with the above plan of care.    Advised patient to follow up with the [facility] at [phone] if she needs anything else.     GREGORIA Fried - NP  '

## 2025-06-04 ENCOUNTER — OFFICE VISIT (OUTPATIENT)
Dept: ORTHOPEDIC SURGERY | Age: 85
End: 2025-06-04

## 2025-06-04 DIAGNOSIS — M75.101 NONTRAUMATIC TEAR OF RIGHT ROTATOR CUFF, UNSPECIFIED TEAR EXTENT: Primary | ICD-10-CM

## 2025-06-04 RX ORDER — TRIAMCINOLONE ACETONIDE 40 MG/ML
40 INJECTION, SUSPENSION INTRA-ARTICULAR; INTRAMUSCULAR ONCE
Status: COMPLETED | OUTPATIENT
Start: 2025-06-04 | End: 2025-06-04

## 2025-06-04 RX ADMIN — TRIAMCINOLONE ACETONIDE 40 MG: 40 INJECTION, SUSPENSION INTRA-ARTICULAR; INTRAMUSCULAR at 10:48

## 2025-06-04 NOTE — PROGRESS NOTES
RIGHT Subacromial Subdeltoid Bursa Injection, Ultrasound Guided     An injection of corticosteroid was discussed today and is indicated for patient’s pain and condition today.  A time out was completed prior to proceeding. Site of injection, procedure, and all team members were identified. Risks were discussed. Patient verbally consented to procedure. Risks explained include infection, bleeding, nerve damage, steroid flare, elevation in blood glucose and pain at the site of injection were discussed at length with the patient.     With the patient sitting upright, a lateral approach was utilized for this injection procedure. The site of the injection was cleansed chlorhexidine. site of the injection was then cleansed chlorhexidine. A sterile gel was then placed over the area and the ultrasound probe was used to positioned to reveal the subacromial bursa. Following this a vapo coolant spray was utilized to provide local skin anesthesia. A solution of 40mg/ml Kenalog, 1cc and 4cc 1% lidocaine was delivered through a 25 gauge, 1.5\" needle into the subacromial space. The site was again cleansed with an alcohol swab. Ultrasound was used to ensure adequate deposition of the injectate solution into the correct location. The patient tolerated this procedure well with no adverse events. There was some notable pain relief reported by the patient prior to leaving the office today. The patient was counseled not to submerge the site for 24 hours, not to perform strenuous activity for the next five days, and if notes any signs or symptoms consistent with joint infection or allergic reaction to go to a local emergency room. The patient was observed for 15 minutes postprocedure and was allowed to be discharged from clinic in their usual state of health.      Jacek Bowles MD, [facility]  Orthopedics and Sports Medicine  [facility]

## 2025-06-05 ENCOUNTER — HOSPITAL ENCOUNTER (OUTPATIENT)
Dept: NON INVASIVE DIAGNOSTICS | Age: 85
Discharge: HOME | End: 2025-06-07
Attending: INTERNAL MEDICINE
Payer: MEDICARE

## 2025-06-05 DIAGNOSIS — I50.32 CHRONIC DIASTOLIC CONGESTIVE HEART FAILURE (HCC): ICD-10-CM

## 2025-06-05 DIAGNOSIS — I50.32 CHRONIC HEART FAILURE WITH PRESERVED EJECTION FRACTION (HCC): ICD-10-CM

## 2025-06-05 LAB
ECHO AO ASC DIAM: 3.1 CM
ECHO AO ROOT DIAM: 2.9 CM
ECHO EST RA PRESSURE: 3 MMHG
ECHO LA DIAMETER: 4.7 CM
ECHO LA TO AORTIC ROOT RATIO: 1.62
ECHO LV E' LATERAL VELOCITY: 8.84 CM/S
ECHO LV E' SEPTAL VELOCITY: 7.02 CM/S
ECHO LV EDV A2C: 58 ML
ECHO LV EDV A4C: 64 ML
ECHO LV EDV BP: 63 ML (ref 56–104)
ECHO LV EF PHYSICIAN: 60 %
ECHO LV EJECTION FRACTION A2C: 59 %
ECHO LV EJECTION FRACTION A4C: 61 %
ECHO LV EJECTION FRACTION BIPLANE: 61 % (ref 55–100)
ECHO LV ESV A2C: 24 ML
ECHO LV ESV A4C: 25 ML
ECHO LV ESV BP: 25 ML (ref 19–49)
ECHO LV FRACTIONAL SHORTENING: 27 % (ref 28–44)
ECHO LV INTERNAL DIMENSION DIASTOLIC: 4.5 CM (ref 3.9–5.3)
ECHO LV INTERNAL DIMENSION SYSTOLIC: 3.3 CM
ECHO LV IVSD: 1.2 CM (ref 0.6–0.9)
ECHO LV MASS 2D: 210.2 G (ref 67–162)
ECHO LV POSTERIOR WALL DIASTOLIC: 1.3 CM (ref 0.6–0.9)
ECHO LV RELATIVE WALL THICKNESS RATIO: 0.58
ECHO LVOT AREA: 3.1 CM2
ECHO LVOT DIAM: 2 CM
ECHO MV A VELOCITY: 0.23 M/S
ECHO MV AREA PHT: 3.5 CM2
ECHO MV E DECELERATION TIME (DT): 219.3 MS
ECHO MV E VELOCITY: 0.81 M/S
ECHO MV E/A RATIO: 3.52
ECHO MV E/E' LATERAL: 9.16
ECHO MV E/E' RATIO (AVERAGED): 10.35
ECHO MV E/E' SEPTAL: 11.54
ECHO MV PRESSURE HALF TIME (PHT): 63.6 MS
ECHO PV ACCELERATION TIME (AT): 94.2 MS
ECHO PV MAX VELOCITY: 0.7 M/S
ECHO PV PEAK GRADIENT: 2 MMHG
ECHO RIGHT VENTRICULAR SYSTOLIC PRESSURE (RVSP): 31 MMHG
ECHO RV BASAL DIMENSION: 4.3 CM
ECHO RV FREE WALL PEAK S': 10.5 CM/S
ECHO RV TAPSE: 1.8 CM (ref 1.7–?)
ECHO TV REGURGITANT MAX VELOCITY: 2.64 M/S
ECHO TV REGURGITANT PEAK GRADIENT: 28 MMHG

## 2025-06-05 PROCEDURE — 93306 TTE W/DOPPLER COMPLETE: CPT

## 2025-06-06 ENCOUNTER — HOSPITAL ENCOUNTER (OUTPATIENT)
Dept: PHYSICAL THERAPY | Age: 85
Setting detail: RECURRING SERIES
Discharge: HOME | End: 2025-06-09
Payer: MEDICARE

## 2025-06-06 PROCEDURE — 97110 THERAPEUTIC EXERCISES: CPT

## 2025-06-06 PROCEDURE — 97035 APP MDLTY 1+ULTRASOUND EA 15: CPT

## 2025-06-06 ASSESSMENT — PAIN SCALES - GENERAL: PAINLEVEL_OUTOF10: 1

## 2025-06-06 NOTE — PROGRESS NOTES
end ROM subscap 2x5   Shoulder flexion Supine wand 0# 2x5. Emphasis on scapular retraction and depression   Scapular retraction  Posture correction in standing and seated for exercises,    Light resistance in supine 2x10   Midtrap  Supine resisted 2x10   Low trap Supine resisted scapular depression 2x10               Treatment/Session Summary:    Treatment Assessment: much better tolerance to arm positioning and active movement. Focused on scapular positioning and stabilization with gentle rotator cuff facilitation  Communication/Consultation:  Therapy Evaluation sent to referring provider  Equipment provided today:  HEP  Recommendations/Intent for next treatment session: Next visit will focus on ROM quality and muscle strengthening as tolerated.  >Total Treatment Billable Duration:    42   minutes  Time In: 1115  Time Out: 1208     Kathy Baron PT         Charge Capture  Events  [employer] Portal  Appt Desk  Attendance Report     Future Appointments   Date Time Provider Department Center   6/10/2025  9:00 AM Kathy Baron, PT [facility]   6/23/2025  1:00 PM Kathy Baron, PT [facility]   6/27/2025  1:45 PM Kathy Baron, PT [facility]   6/30/2025 10:30 AM Kathy Baron, PT [facility]   7/2/2025 11:15 AM Kathy Baron, PT [facility]   7/23/2025 10:30 AM Cameron Rashid MD [facility] AM[facility]   9/4/2025 10:40 AM Jacek Bowles MD [facility] AMB   9/22/2025 10:45 AM Bobby Light III, MD UCD[facility] AMB   3/31/2026 10:30 AM Laron Bocanegra MD ENTG [facility] AMB   3/31/2026 10:30 AM Joya Bustos AuD [facility] AMB

## 2025-06-10 ENCOUNTER — HOSPITAL ENCOUNTER (OUTPATIENT)
Dept: PHYSICAL THERAPY | Age: 85
Setting detail: RECURRING SERIES
Discharge: HOME | End: 2025-06-13
Payer: MEDICARE

## 2025-06-10 PROCEDURE — 97110 THERAPEUTIC EXERCISES: CPT

## 2025-06-10 ASSESSMENT — PAIN SCALES - GENERAL: PAINLEVEL_OUTOF10: 1

## 2025-06-10 NOTE — PROGRESS NOTES
Destiney ADAMS Solder  : 1940  Primary: Medicare Part A And B (Medicare)  Secondary: AARP HEALTH CARE MEDICARE SUPP [facility] @ [address]  [facility] [address]  Phone: [phone]  Fax: [phone] Plan Frequency: 2 times a week for 8 weeks    Plan of Care/Certification Expiration Date: 25        Plan of Care/Certification Expiration Date:  Plan of Care/Certification Expiration Date: 25    Frequency/Duration: Plan Frequency: 2 times a week for 8 weeks      Time In/Out:   Time In: 0900  Time Out: 09      PT Visit Info:    Progress Note Due Date: 25  Progress Note Counter: 1      Visit Count:  10    OUTPATIENT PHYSICAL THERAPY:   Treatment Note 6/10/2025       Episode  (right shoulder pain)               Treatment Diagnosis:    Acute pain of right shoulder  Medical/Referring Diagnosis:    Right shoulder pain, unspecified chronicity  Traumatic tear of right rotator cuff, unspecified tear extent, initial encounter      Referring Physician:  Jacek Bowles MD MD Orders:  PT Eval and Treat   Return MD Appt:  25  Date of Onset:  25  Allergies:   Albuterol, Amoxicillin, Guaifenesin, Iodine, Black walnut flavoring agent (non-screening), Ciprofloxacin, Dronedarone, Fish oil, Lactose intolerance (gi), Nsaids, Omega-3 fatty acids, Povidone iodine, Statins, Strawberry, Sulfa antibiotics, and Sulfamethoxazole-trimethoprim  Restrictions/Precautions:     Restrictions/Precautions: pacemaker    25: no strenuous work of the right shoulder until 6/10/25 due to injection on 25     Interventions Planned (Treatment may consist of any combination of the following):  Current Treatment Recommendations: Strengthening; ROM; Neuromuscular re-education; Manual; Pain management; Home exercise program; Modalities; Positioning      Subjective Comments: Pt reports doing the HEP but only doing the ER 3 reps because it hurt.   Initial Pain Level:     1/10  Post

## 2025-06-23 ENCOUNTER — HOSPITAL ENCOUNTER (OUTPATIENT)
Dept: PHYSICAL THERAPY | Age: 85
Setting detail: RECURRING SERIES
Discharge: HOME | End: 2025-06-26
Payer: MEDICARE

## 2025-06-23 PROCEDURE — 97110 THERAPEUTIC EXERCISES: CPT

## 2025-06-23 ASSESSMENT — PAIN SCALES - GENERAL: PAINLEVEL_OUTOF10: 0

## 2025-06-23 NOTE — PROGRESS NOTES
Destiney ADAMS Solder  : 1940  Primary: Medicare Part A And B (Medicare)  Secondary: AARP HEALTH CARE MEDICARE SUPP [facility] @ [address]  [facility] [address]  Phone: [phone]  Fax: [phone] Plan Frequency: 2 times a week for 8 weeks    Plan of Care/Certification Expiration Date: 25        Plan of Care/Certification Expiration Date:  Plan of Care/Certification Expiration Date: 25    Frequency/Duration: Plan Frequency: 2 times a week for 8 weeks      Time In/Out:   Time In: 1257  Time Out: 1341      PT Visit Info:    Progress Note Due Date: 25  Progress Note Counter: 1      Visit Count:  11    OUTPATIENT PHYSICAL THERAPY:   Treatment Note 2025       Episode  (right shoulder pain)               Treatment Diagnosis:    Acute pain of right shoulder  Medical/Referring Diagnosis:    Right shoulder pain, unspecified chronicity  Traumatic tear of right rotator cuff, unspecified tear extent, initial encounter      Referring Physician:  Jacek Bowles MD MD Orders:  PT Eval and Treat   Return MD Appt:  25  Date of Onset:  25  Allergies:   Albuterol, Amoxicillin, Guaifenesin, Iodine, Black walnut flavoring agent (non-screening), Ciprofloxacin, Dronedarone, Fish oil, Lactose intolerance (gi), Nsaids, Omega-3 fatty acids, Povidone iodine, Statins, Strawberry, Sulfa antibiotics, and Sulfamethoxazole-trimethoprim  Restrictions/Precautions:     Restrictions/Precautions: pacemaker    Interventions Planned (Treatment may consist of any combination of the following):  Current Treatment Recommendations: Strengthening; ROM; Neuromuscular re-education; Manual; Pain management; Home exercise program; Modalities; Positioning      Subjective Comments: Pt reports doing the HEP and using the arm so much better. Ready for discharge at the next visit  Initial Pain Level:     0/10  Post Session Pain Level:     0/10   Medications Last Reviewed:

## 2025-06-27 ENCOUNTER — HOSPITAL ENCOUNTER (OUTPATIENT)
Dept: PHYSICAL THERAPY | Age: 85
Setting detail: RECURRING SERIES
Discharge: HOME | End: 2025-06-30
Payer: MEDICARE

## 2025-06-27 PROCEDURE — 97110 THERAPEUTIC EXERCISES: CPT

## 2025-06-27 ASSESSMENT — PAIN SCALES - GENERAL: PAINLEVEL_OUTOF10: 0

## 2025-06-27 NOTE — PROGRESS NOTES
Destiney ADAMS Solder  : 1940  Primary: Medicare Part A And B (Medicare)  Secondary: [employer] MEDICARE SUPP [facility] @ [address]  [facility] [address]  Phone: [phone]  Fax: [phone] Plan Frequency: 2 times a week for 8 weeks    Plan of Care/Certification Expiration Date: 25        Plan of Care/Certification Expiration Date:  Plan of Care/Certification Expiration Date: 25    Frequency/Duration: Plan Frequency: 2 times a week for 8 weeks      Time In/Out:   Time In: 1342  Time Out: 1429      PT Visit Info:    Progress Note Due Date: 25  Progress Note Counter: 1      Visit Count:  12    OUTPATIENT PHYSICAL THERAPY:   Treatment Note 2025       Episode  (right shoulder pain)               Treatment Diagnosis:    Acute pain of right shoulder  Medical/Referring Diagnosis:    Right shoulder pain, unspecified chronicity  Traumatic tear of right rotator cuff, unspecified tear extent, initial encounter      Referring Physician:  Jacek Bowles MD MD Orders:  PT Eval and Treat   Return MD Appt:  25  Date of Onset:  25  Allergies:   Albuterol, Amoxicillin, Guaifenesin, Iodine, Black walnut flavoring agent (non-screening), Ciprofloxacin, Dronedarone, Fish oil, Lactose intolerance (gi), Nsaids, Omega-3 fatty acids, Povidone iodine, Statins, Strawberry, Sulfa antibiotics, and Sulfamethoxazole-trimethoprim  Restrictions/Precautions:     Restrictions/Precautions: pacemaker    Interventions Planned (Treatment may consist of any combination of the following):  Current Treatment Recommendations: Strengthening; ROM; Neuromuscular re-education; Manual; Pain management; Home exercise program; Modalities; Positioning      Subjective Comments: Pt reports doing the HEP and ready to be discharged  Initial Pain Level:     0/10  Post Session Pain Level:    0  /10   Medications Last Reviewed: 2025  Updated Objective Findings:    See

## 2025-06-27 NOTE — THERAPY DISCHARGE
Destiney ADAMS Solder  : 1940  Primary: Medicare Part A And B (Medicare)  Secondary: [employer] MEDICARE SUPP [facility] @ [address]  [facility] [address]  Phone: [phone]  Fax: [phone] Plan Frequency: 2 times a week for 8 weeks  Plan of Care/Certification Expiration Date: 25        Plan of Care/Certification Expiration Date:  Plan of Care/Certification Expiration Date: 25    Frequency/Duration: Plan Frequency: 2 times a week for 8 weeks      Time In/Out:   Time In: 1345      PT Visit Info:    Progress Note Due Date: 25  Progress Note Counter: 1      Visit Count:  12                OUTPATIENT PHYSICAL THERAPY:             Discharge Summary 2025               Episode (right shoulder pain)         Treatment Diagnosis:     No data found  Medical/Referring Diagnosis:    Right shoulder pain, unspecified chronicity  Traumatic tear of right rotator cuff, unspecified tear extent, initial encounter      Referring Physician:  Jacek Bowles MD MD Orders:  PT Eval and Treat   Return MD Appt:  25  Date of Onset:    25  Allergies:  Albuterol, Amoxicillin, Guaifenesin, Iodine, Black walnut flavoring agent (non-screening), Ciprofloxacin, Dronedarone, Fish oil, Lactose intolerance (gi), Nsaids, Omega-3 fatty acids, Povidone iodine, Statins, Strawberry, Sulfa antibiotics, and Sulfamethoxazole-trimethoprim  Restrictions/Precautions:      Restrictions/Precautions: Pacemaker replacement      Medications Last Reviewed: 2025     SUBJECTIVE   History of Injury/Illness (Reason for Referral):  Pt reports feeling sudden severe shoulder pain when trying to lift a bag of groceries out of her grocery cart. She put ice on it and went to Urgent care and X ray was negative. She was given a sling and was referred to an orthopedist. Orthopedist recommended PT. Pt complains of now having catch type pain in her neck, but denies any numbness

## 2025-06-30 ENCOUNTER — APPOINTMENT (OUTPATIENT)
Dept: PHYSICAL THERAPY | Age: 85
End: 2025-06-30
Payer: MEDICARE

## 2025-06-30 ENCOUNTER — PATIENT MESSAGE (OUTPATIENT)
Dept: UROLOGY | Age: 85
End: 2025-06-30

## 2025-06-30 RX ORDER — SOLIFENACIN SUCCINATE 5 MG/1
5 TABLET, FILM COATED ORAL DAILY
Qty: 90 TABLET | Refills: 1 | Status: SHIPPED | OUTPATIENT
Start: 2025-06-30

## 2025-07-17 ENCOUNTER — TELEPHONE (OUTPATIENT)
Age: 85
End: 2025-07-17

## 2025-07-17 ENCOUNTER — PATIENT MESSAGE (OUTPATIENT)
Age: 85
End: 2025-07-17

## 2025-07-17 DIAGNOSIS — M17.12 UNILATERAL PRIMARY OSTEOARTHRITIS, LEFT KNEE: Primary | ICD-10-CM

## 2025-07-17 DIAGNOSIS — R25.2 LEG CRAMPS: ICD-10-CM

## 2025-07-17 DIAGNOSIS — M17.11 UNILATERAL PRIMARY OSTEOARTHRITIS, RIGHT KNEE: ICD-10-CM

## 2025-07-17 DIAGNOSIS — M79.604 LEG PAIN, BILATERAL: Primary | ICD-10-CM

## 2025-07-17 DIAGNOSIS — R20.0 BILATERAL LEG NUMBNESS: ICD-10-CM

## 2025-07-17 DIAGNOSIS — M79.605 LEG PAIN, BILATERAL: Primary | ICD-10-CM

## 2025-07-17 DIAGNOSIS — R25.2 BILATERAL LEG CRAMPS: ICD-10-CM

## 2025-07-17 NOTE — TELEPHONE ENCOUNTER
PVD  (Newest Message First)               25 12:49 PM  Cathie Lorenzo MA routed this conversation to [facility] Triage (Selected Message)    25 11:16 AM  Florence Fraire MA routed this conversation to Cathie Lorenzo MA Marsha A Solder to P [facility] Clinical Staff (supporting Bobby Light III, MD)        25 11:12 AM     Dr. Light, my legs have been  giving me much discomfort & the night cramps have  forced me to sleep in a recliner so my legs can be elevated . For the past month I have added a new medication, solifenacin, (Vesicare) for urinary incontinence.  I don't know if this is the  cause.  Should I make an appointment with you for an evaluation or do you have a   vascular MD that I should see.  My cardiac condition remains stable.    Destiney Brown  1940  Phone [phone].  Thank you.

## 2025-07-17 NOTE — TELEPHONE ENCOUNTER
\"Excruciating\" cramping in both legs when resting flat in bed at night that cause much difficulty walking, much worse x 2-3 weeks.   Cannot feel popliteal or pedal pulses.   Veins in feet and legs to knees look very blue and distended, \"sticking out\" more than usual.   No LE edema.   Feet feel numb.   No leg pain or cramping during the day.  Skin on feet and legs has purple and maroon patches.   Sleeping sitting up in recliner help, a little bit.   Has been taking Vesicare for about 1 month.  Taking Eliquis 5 mg BID, lasix 40 mg qd PRN (about 2 x monthly), Imdur 30 mg qd, losartan 50 mg qd, Toprol XL 50 mg qd, KCL 20 mEq qd, Vesicare 5 mg qd, and spironolactone 25 mg qd.     Patient asks if Vesicare could be causing leg cramps. She asks for Dr. Light's recommendations for leg cramps. She asks if she needs to see Dr. Light or vascular surgeon.     Advised patient that I will notify Dr. Light of above and call with his response later today or tomorrow. Patient verbalized understanding. She states the power has been going off and on at her house, and phone does not work when power is off.

## 2025-07-18 NOTE — TELEPHONE ENCOUNTER
Advised patient of Dr. Light's response. Advised patient that someone will be calling to schedule LE arterial Doppler with ABIs. Patient verbalized understanding. She states power keeps going on and off, today, which might keep phones from working, as it did, yesterday.   Orders Placed This Encounter   Procedures    Vascular duplex lower extremity arteries bilateral with ORQUIDEA     Standing Status:   Future     Expected Date:   7/18/2025     Expiration Date:   7/18/2026     Routed this triage note to scheduling pool.

## 2025-07-18 NOTE — TELEPHONE ENCOUNTER
Bobby Light III, MD Keener, Lynn F RN  Caller: Unspecified (Yesterday,  1:34 PM)  I do not think that the Vesicare, which should send her for an ORQUIDEA and lower extremity arterial Doppler to exclude PAD.  Thanks

## 2025-07-23 ENCOUNTER — OFFICE VISIT (OUTPATIENT)
Dept: ORTHOPEDIC SURGERY | Age: 85
End: 2025-07-23

## 2025-07-23 DIAGNOSIS — M17.11 UNILATERAL PRIMARY OSTEOARTHRITIS, RIGHT KNEE: ICD-10-CM

## 2025-07-23 DIAGNOSIS — M17.12 UNILATERAL PRIMARY OSTEOARTHRITIS, LEFT KNEE: Primary | ICD-10-CM

## 2025-07-23 NOTE — PROGRESS NOTES
Name: Destiney ADAMS Solder  YOB: 1940  Gender: female  MRN: [id number]    CC:   Chief Complaint   Patient presents with    Knee Pain     Bilateral knee Euflexxa #1-updating left knee xray today         DIAGNOSIS:   Encounter Diagnoses   Name Primary?    Unilateral primary osteoarthritis, left knee Yes    Unilateral primary osteoarthritis, right knee         HPI:   The pain has been present for months and is becoming worse.  It hurts at night when sleeping.  The pain is located over the knee.  It does hurt to walk and gets worse with increased distances.   The pain does not radiate down the leg.  Numbness and tingling are not noted.   Treatment so far has been injections in the past.  She has had some instability episodes as of recent      Current Outpatient Medications:     solifenacin (VESICARE) 5 MG tablet, Take 1 tablet by mouth daily, Disp: 90 tablet, Rfl: 1    methylPREDNISolone (MEDROL DOSEPACK) 4 MG tablet, Follow package instructions (Patient not taking: Reported on 5/28/2025), Disp: 1 kit, Rfl: 0    losartan (COZAAR) 50 MG tablet, Take 1 tablet by mouth daily, Disp: 90 tablet, Rfl: 3    metoprolol succinate (TOPROL XL) 50 MG extended release tablet, Take 1 tablet by mouth daily, Disp: 90 tablet, Rfl: 3    potassium chloride (KLOR-CON M) 20 MEQ extended release tablet, Take 1 tablet by mouth daily, Disp: 30 tablet, Rfl: 5    spironolactone (ALDACTONE) 25 MG tablet, Take 1 tablet by mouth daily, Disp: 90 tablet, Rfl: 3    solifenacin (VESICARE) 5 MG tablet, Take 1 tablet by mouth daily, Disp: 30 tablet, Rfl: 3    vitamin B-12 (CYANOCOBALAMIN) 500 MCG tablet, Take 1 tablet by mouth daily, Disp: , Rfl:     ipratropium (ATROVENT) 0.06 % nasal spray, 2 sprays by Each Nostril route 3 times daily as needed for Rhinitis, Disp: 15 mL, Rfl: 1    isosorbide mononitrate (IMDUR) 30 MG extended release tablet, TAKE 1 TABLET BY MOUTH EVERY MORNING, Disp: 90 tablet, Rfl: 3    timolol (TIMOPTIC) 0.5 % ophthalmic

## 2025-07-29 ENCOUNTER — PATIENT MESSAGE (OUTPATIENT)
Age: 85
End: 2025-07-29

## 2025-07-30 ENCOUNTER — OFFICE VISIT (OUTPATIENT)
Dept: ORTHOPEDIC SURGERY | Age: 85
End: 2025-07-30
Payer: MEDICARE

## 2025-07-30 DIAGNOSIS — M17.11 UNILATERAL PRIMARY OSTEOARTHRITIS, RIGHT KNEE: ICD-10-CM

## 2025-07-30 DIAGNOSIS — M17.12 UNILATERAL PRIMARY OSTEOARTHRITIS, LEFT KNEE: Primary | ICD-10-CM

## 2025-07-30 PROCEDURE — 20611 DRAIN/INJ JOINT/BURSA W/US: CPT | Performed by: ORTHOPAEDIC SURGERY

## 2025-07-30 NOTE — PROGRESS NOTES
Name: Destiney ADAMS Solder  YOB: 1940  Gender: female  MRN: [id number]        CC: Bilateral Knee Pain     PROCEDURE:  2  of 3 bilateral knee HP    Diagnosis:   Encounter Diagnoses   Name Primary?    Unilateral primary osteoarthritis, left knee Yes    Unilateral primary osteoarthritis, right knee           Mindray US unit with variable frequency (6.0-15.0 MHz) linear transducer was used to visualize the intracondylar notch, retropatellar fat pad, patella tendon, patella, tibia, and to ensure proper intra-articular needle placement.  Injection image was saved in patient's permanent chart.    Procedure Note: Time out was performed which included identifying the patient by name and date of birth.  The procedure site was identified with all present in agreement.  The patient was placed in upright position with both knees hanging freely from exam table.  The knees were prepped in sterile fashion using alcohol wipe.  Using the [employer] ultrasound guidance, a 22 gauge needle was then introduced into both knee joints from an infrapatellar approach and 2 mL of Euflexxa was injected freely into each knee.  The needle was then removed, pressure hemostasis achieved, injection sites were cleansed with alcohol wipe, and dressed with band aid.    The patient tolerated the procedure without complication.  The patient will follow up as scheduled.

## 2025-08-05 ENCOUNTER — OFFICE VISIT (OUTPATIENT)
Dept: UROLOGY | Age: 85
End: 2025-08-05
Payer: MEDICARE

## 2025-08-05 DIAGNOSIS — N39.0 RECURRENT UTI: ICD-10-CM

## 2025-08-05 DIAGNOSIS — R39.15 URINARY URGENCY: Primary | ICD-10-CM

## 2025-08-05 DIAGNOSIS — R39.15 URINARY URGENCY: ICD-10-CM

## 2025-08-05 LAB
BILIRUBIN, URINE, POC: NEGATIVE
BLOOD URINE, POC: NORMAL
GLUCOSE URINE, POC: NEGATIVE MG/DL
KETONES, URINE, POC: NEGATIVE MG/DL
LEUKOCYTE ESTERASE, URINE, POC: NORMAL
NITRITE, URINE, POC: NEGATIVE
PH, URINE, POC: 6 (ref 4.6–8)
PROTEIN,URINE, POC: NORMAL MG/DL
SPECIFIC GRAVITY, URINE, POC: 1.01 (ref 1–1.03)
URINALYSIS CLARITY, POC: NORMAL
URINALYSIS COLOR, POC: NORMAL
UROBILINOGEN, POC: NORMAL MG/DL

## 2025-08-05 PROCEDURE — G8428 CUR MEDS NOT DOCUMENT: HCPCS | Performed by: NURSE PRACTITIONER

## 2025-08-05 PROCEDURE — 1036F TOBACCO NON-USER: CPT | Performed by: NURSE PRACTITIONER

## 2025-08-05 PROCEDURE — 1123F ACP DISCUSS/DSCN MKR DOCD: CPT | Performed by: NURSE PRACTITIONER

## 2025-08-05 PROCEDURE — G8417 CALC BMI ABV UP PARAM F/U: HCPCS | Performed by: NURSE PRACTITIONER

## 2025-08-05 PROCEDURE — 99214 OFFICE O/P EST MOD 30 MIN: CPT | Performed by: NURSE PRACTITIONER

## 2025-08-05 PROCEDURE — 1090F PRES/ABSN URINE INCON ASSESS: CPT | Performed by: NURSE PRACTITIONER

## 2025-08-05 PROCEDURE — 81003 URINALYSIS AUTO W/O SCOPE: CPT | Performed by: NURSE PRACTITIONER

## 2025-08-05 RX ORDER — MIRABEGRON 50 MG/1
50 TABLET, FILM COATED, EXTENDED RELEASE ORAL DAILY
Qty: 90 TABLET | Refills: 3 | Status: SHIPPED | OUTPATIENT
Start: 2025-08-05 | End: 2025-08-05

## 2025-08-05 RX ORDER — METHENAMINE HIPPURATE 1000 MG/1
1 TABLET ORAL 2 TIMES DAILY WITH MEALS
Qty: 180 TABLET | Refills: 3 | Status: SHIPPED | OUTPATIENT
Start: 2025-08-05

## 2025-08-05 RX ORDER — MIRABEGRON 50 MG/1
50 TABLET, FILM COATED, EXTENDED RELEASE ORAL DAILY
Qty: 30 TABLET | Refills: 11 | Status: SHIPPED | OUTPATIENT
Start: 2025-08-05

## 2025-08-05 ASSESSMENT — ENCOUNTER SYMPTOMS
NAUSEA: 0
BACK PAIN: 0

## 2025-08-06 ENCOUNTER — OFFICE VISIT (OUTPATIENT)
Dept: ORTHOPEDIC SURGERY | Age: 85
End: 2025-08-06
Payer: MEDICARE

## 2025-08-06 DIAGNOSIS — M17.12 UNILATERAL PRIMARY OSTEOARTHRITIS, LEFT KNEE: Primary | ICD-10-CM

## 2025-08-06 DIAGNOSIS — M17.11 UNILATERAL PRIMARY OSTEOARTHRITIS, RIGHT KNEE: ICD-10-CM

## 2025-08-06 PROCEDURE — 20611 DRAIN/INJ JOINT/BURSA W/US: CPT | Performed by: ORTHOPAEDIC SURGERY

## 2025-08-08 LAB
BACTERIA SPEC CULT: ABNORMAL
SERVICE CMNT-IMP: ABNORMAL

## (undated) DEVICE — AMPLATZ EXTRA STIFF WIRE GUIDE: Brand: AMPLATZ

## (undated) DEVICE — DERMABOND SKIN ADH 0.7ML -- DERMABOND ADVANCED 12/BX

## (undated) DEVICE — SUTURE STRATAFIX SPRL SZ 3-0 L9IN ABSRB VLT FS L26MM 3/8 SXPD2B419

## (undated) DEVICE — DRAPE, FILM SHEET, 44X65 STERILE: Brand: MEDLINE

## (undated) DEVICE — SINGLE PORT MANIFOLD: Brand: NEPTUNE 2

## (undated) DEVICE — SYRINGE MED 10ML LUERLOCK TIP W/O SFTY DISP

## (undated) DEVICE — SURGICAL PROCEDURE PACK BASIC ST FRANCIS

## (undated) DEVICE — STERILE HOOK LOCK LATEX FREE ELASTIC BANDAGE 2INX5YD: Brand: HOOK LOCK™

## (undated) DEVICE — DRAPE,HAND,STERILE: Brand: MEDLINE

## (undated) DEVICE — KIT ANGIO CNTRST ADMIN W O BWL WORLEY

## (undated) DEVICE — FORCEPS BX L240CM JAW DIA2.8MM L CAP W/ NDL MIC MESH TOOTH

## (undated) DEVICE — 18G NG KIT WITH 96IN PROBE COVER (10 PK): Brand: SITE-RITE

## (undated) DEVICE — KENDALL RADIOLUCENT FOAM MONITORING ELECTRODE RECTANGULAR SHAPE: Brand: KENDALL

## (undated) DEVICE — GUIDE COR SNUS L40CM DIA9FR 0.035IN STD CRV ADV UNIQUE

## (undated) DEVICE — CONNECTOR TBNG OD5-7MM O2 END DISP

## (undated) DEVICE — DISPOSABLE BIPOLAR CODE, 12' (3.66 M): Brand: CONMED

## (undated) DEVICE — PATCH REF EXT FOR CARTO 3 SYS (EA = 6 PACKS)

## (undated) DEVICE — RUBBERBAND FASTENING W0.25XL3.5IN 5 PER PK

## (undated) DEVICE — PINNACLE TIF INTRODUCER SHEATH: Brand: PINNACLE

## (undated) DEVICE — SOLUTION IRRIG 1000ML 0.9% SOD CHL USP POUR PLAS BTL

## (undated) DEVICE — 1010 S-DRAPE TOWEL DRAPE 10/BX: Brand: STERI-DRAPE™

## (undated) DEVICE — SUTURE NONABSORBABLE MONOFILAMENT 4-0 PS-2 18 IN BLU PROLENE 8682H

## (undated) DEVICE — SYRINGE MED 3ML CLR PLAS STD N CTRL LUERLOCK TIP DISP

## (undated) DEVICE — CANNULA NSL ORAL AD FOR CAPNOFLEX CO2 O2 AIRLFE

## (undated) DEVICE — SYRINGE EAR 2OZ ULC SLIMMER TIP FLAT BTM SUCT PWR DISP FOR

## (undated) DEVICE — AIRLIFE™ OXYGEN TUBING 7 FEET (2.1 M) CRUSH RESISTANT OXYGEN TUBING, VINYL TIPPED: Brand: AIRLIFE™

## (undated) DEVICE — PADDING CAST W3INXL4YD COT BLEND MIC PLEAT UNDERCAST SPEC

## (undated) DEVICE — PLASMABLADE X PS210-030S-LIGHT 3.0SL: Brand: PLASMABLADE™ X

## (undated) DEVICE — GLOVE SURG SZ 65 THK91MIL LTX FREE SYN POLYISOPRENE

## (undated) DEVICE — AMD ANTIMICROBIAL GAUZE SPONGES,12 PLY USP TYPE VII, 0.2% POLYHEXAMETHYLENE BIGUANIDE HCI (PHMB): Brand: CURITY

## (undated) DEVICE — SYR 10ML LUER LOK 1/5ML GRAD --

## (undated) DEVICE — MICROPUNCTURE INTRODUCER SET SILHOUETTE TRANSITIONLESS WITH NITINOL WIRE GUIDE: Brand: MICROPUNCTURE

## (undated) DEVICE — NEEDLE SYR 18GA L1.5IN RED PLAS HUB S STL BLNT FILL W/O

## (undated) DEVICE — CATHETER ABLAT 8FR L115CM 1-6-2MM SPC TIP 3.5MM FJ CRV

## (undated) DEVICE — SYRINGE IRRIG 60ML SFT PLIABLE BLB EZ TO GRP 1 HND USE W/

## (undated) DEVICE — SUTURE ETHLN SZ 4-0 L18IN NONABSORBABLE BLK L19MM PS-2 3/8 1667H

## (undated) DEVICE — HAND PACK: Brand: MEDLINE INDUSTRIES, INC.

## (undated) DEVICE — ZIMMER® STERILE DISPOSABLE TOURNIQUET CUFF WITH PLC, DUAL PORT, SINGLE BLADDER, 18 IN. (46 CM)

## (undated) DEVICE — DEVICE INFLATION 60 CC INFLATORY

## (undated) DEVICE — PADDING CAST W2INXL4YD ST COT COHESIVE HND TEARABLE SPEC

## (undated) DEVICE — LUBE JELLY FOIL PACK 1.4 OZ: Brand: MEDLINE INDUSTRIES, INC.

## (undated) DEVICE — SYSTEM CLOSURE 6-12 FR VEN VASC VASCADE MVP

## (undated) DEVICE — SUT ETHBND 0 18IN MO6 MP GRN --

## (undated) DEVICE — BANDAGE COMPR 9 FTX4 IN SMOOTH COMFORTABLE SYNTH ESMRK LF

## (undated) DEVICE — INTRO PEELWY HEMVLV 7F 13CM -- SHRT PRELUDE SNAP

## (undated) DEVICE — TUBING PMP FOR CARTO SYS SMARTABLATE

## (undated) DEVICE — STOPCOCK TRNSDUC 500PSI 3 W ROT M LUER LT BLU OFF HNDL R

## (undated) DEVICE — ADHESIVE SKIN CLSR 0.7ML TOP DERMBND ADV

## (undated) DEVICE — (D)PREP SKN CHLRAPRP APPL 26ML -- CONVERT TO ITEM 371833

## (undated) DEVICE — DRESSING,GAUZE,XEROFORM,CURAD,1"X8",ST: Brand: CURAD

## (undated) DEVICE — SUTURE ABSORBABLE MONOFILAMENT 4-0 CV15 6 IN PUR V-LOC 90 VLOCM1203

## (undated) DEVICE — SOLUTION IV 1000ML 0.9% SOD CHL

## (undated) DEVICE — CONTAINER FORMALIN PREFILLED 10% NBF 60ML

## (undated) DEVICE — DRAPE,U/SHT,SPLIT,FILM,60X84,STERILE: Brand: MEDLINE

## (undated) DEVICE — SYSTEM SURG HEMSTAT PWD 1 GM POLYSACCHARIDE HEMOSPHERES

## (undated) DEVICE — SX-ONE MICROKNIFE IS REBRANDED AS ULTRAGUIDECTR.  ULTRAGUIDECTR IS INTENDED TO TRANSECT THE TRANSVERSE CARPAL LIGAMENT FOR THE TREATMENT OF CARPAL TUNNEL SYNDROME.ULTRAGUIDECTR IS A DISPOSABLE DEVICE INTENDED TO CREATE SPACE WITHIN THE CARPAL TUNNEL AND TRANSECT THE TRANSVERSE CARPAL LIGAMENT (TCL) FOR THE TREATMENT OF CARPAL TUNNEL SYNDROME.: Brand: SX-ONE MICROKNIFE

## (undated) DEVICE — NEEDLE HYPO 25GA L1.5IN BLU POLYPR HUB S STL REG BVL STR

## (undated) DEVICE — RETROGRADE KNIFE BOX OF 6: Brand: ECTRA

## (undated) DEVICE — SLING ARM L L17 1/2IN D8.5IN COT POLY DLX W/ SHLDR PD

## (undated) DEVICE — BANDAGE COMPR L5YDXW2IN FOAM CO FLX

## (undated) DEVICE — SUTURE ETHBND EXCEL SZ 0 L18IN NONABSORBABLE GRN L26MM MO-6 CX45D

## (undated) DEVICE — BOWL MED M 16OZ PLAS CAP GRAD

## (undated) DEVICE — INTRODUCER LAT VEIN L62CM OD5.5FR ADV TELSCP SYS RENAL

## (undated) DEVICE — C-ARM: Brand: UNBRANDED

## (undated) DEVICE — DRESSING POSTOP AG PRISMASEAL 3.5X6IN

## (undated) DEVICE — GUIDE WIRE WITH HYDROPHILIC COATING: Brand: ACUITY WHISPER VIEW™

## (undated) DEVICE — GLOVE SURG SZ 65 L12IN FNGR THK79MIL GRN LTX FREE

## (undated) DEVICE — ESOPHAGEAL BALLOON DILATATION CATHETER: Brand: CRE FIXED WIRE

## (undated) DEVICE — GAUZE,SPONGE,4"X4",12PLY,WOVEN,NS,LF: Brand: MEDLINE

## (undated) DEVICE — BLOCK BITE AD 60FR W/ VELC STRP ADDRESSES MOST PT AND